# Patient Record
Sex: MALE | Race: WHITE | HISPANIC OR LATINO
[De-identification: names, ages, dates, MRNs, and addresses within clinical notes are randomized per-mention and may not be internally consistent; named-entity substitution may affect disease eponyms.]

---

## 2017-08-01 PROBLEM — Z00.00 ENCOUNTER FOR PREVENTIVE HEALTH EXAMINATION: Status: ACTIVE | Noted: 2017-08-01

## 2017-08-30 ENCOUNTER — APPOINTMENT (OUTPATIENT)
Dept: FAMILY MEDICINE | Facility: CLINIC | Age: 32
End: 2017-08-30

## 2018-01-30 ENCOUNTER — APPOINTMENT (OUTPATIENT)
Dept: HUMAN REPRODUCTION | Facility: CLINIC | Age: 33
End: 2018-01-30
Payer: COMMERCIAL

## 2018-01-30 PROCEDURE — 89322 SEMEN ANAL STRICT CRITERIA: CPT

## 2020-08-31 ENCOUNTER — EMERGENCY (EMERGENCY)
Facility: HOSPITAL | Age: 35
LOS: 0 days | Discharge: HOME | End: 2020-08-31
Attending: EMERGENCY MEDICINE
Payer: COMMERCIAL

## 2020-08-31 VITALS
RESPIRATION RATE: 18 BRPM | TEMPERATURE: 99 F | HEART RATE: 63 BPM | SYSTOLIC BLOOD PRESSURE: 164 MMHG | OXYGEN SATURATION: 97 % | WEIGHT: 233.91 LBS | HEIGHT: 70 IN | DIASTOLIC BLOOD PRESSURE: 103 MMHG

## 2020-08-31 VITALS
DIASTOLIC BLOOD PRESSURE: 95 MMHG | SYSTOLIC BLOOD PRESSURE: 168 MMHG | HEART RATE: 55 BPM | OXYGEN SATURATION: 98 % | RESPIRATION RATE: 20 BRPM

## 2020-08-31 DIAGNOSIS — M54.9 DORSALGIA, UNSPECIFIED: ICD-10-CM

## 2020-08-31 DIAGNOSIS — H91.93 UNSPECIFIED HEARING LOSS, BILATERAL: ICD-10-CM

## 2020-08-31 DIAGNOSIS — R07.9 CHEST PAIN, UNSPECIFIED: ICD-10-CM

## 2020-08-31 DIAGNOSIS — R00.1 BRADYCARDIA, UNSPECIFIED: ICD-10-CM

## 2020-08-31 DIAGNOSIS — R07.89 OTHER CHEST PAIN: ICD-10-CM

## 2020-08-31 DIAGNOSIS — R94.31 ABNORMAL ELECTROCARDIOGRAM [ECG] [EKG]: ICD-10-CM

## 2020-08-31 LAB
ALBUMIN SERPL ELPH-MCNC: 3.6 G/DL — SIGNIFICANT CHANGE UP (ref 3.3–5)
ALP SERPL-CCNC: 98 U/L — SIGNIFICANT CHANGE UP (ref 40–120)
ALT FLD-CCNC: 57 U/L — SIGNIFICANT CHANGE UP (ref 12–78)
ANION GAP SERPL CALC-SCNC: 6 MMOL/L — SIGNIFICANT CHANGE UP (ref 5–17)
AST SERPL-CCNC: 18 U/L — SIGNIFICANT CHANGE UP (ref 15–37)
BASOPHILS # BLD AUTO: 0.02 K/UL — SIGNIFICANT CHANGE UP (ref 0–0.2)
BASOPHILS NFR BLD AUTO: 0.3 % — SIGNIFICANT CHANGE UP (ref 0–2)
BILIRUB SERPL-MCNC: 0.3 MG/DL — SIGNIFICANT CHANGE UP (ref 0.2–1.2)
BUN SERPL-MCNC: 16 MG/DL — SIGNIFICANT CHANGE UP (ref 7–23)
CALCIUM SERPL-MCNC: 8.6 MG/DL — SIGNIFICANT CHANGE UP (ref 8.5–10.1)
CHLORIDE SERPL-SCNC: 111 MMOL/L — HIGH (ref 96–108)
CO2 SERPL-SCNC: 25 MMOL/L — SIGNIFICANT CHANGE UP (ref 22–31)
CREAT SERPL-MCNC: 1.2 MG/DL — SIGNIFICANT CHANGE UP (ref 0.5–1.3)
EOSINOPHIL # BLD AUTO: 0.19 K/UL — SIGNIFICANT CHANGE UP (ref 0–0.5)
EOSINOPHIL NFR BLD AUTO: 2.7 % — SIGNIFICANT CHANGE UP (ref 0–6)
GLUCOSE SERPL-MCNC: 143 MG/DL — HIGH (ref 70–99)
HCT VFR BLD CALC: 42.1 % — SIGNIFICANT CHANGE UP (ref 39–50)
HGB BLD-MCNC: 14.6 G/DL — SIGNIFICANT CHANGE UP (ref 13–17)
IMM GRANULOCYTES NFR BLD AUTO: 0.3 % — SIGNIFICANT CHANGE UP (ref 0–1.5)
LIDOCAIN IGE QN: 112 U/L — SIGNIFICANT CHANGE UP (ref 73–393)
LYMPHOCYTES # BLD AUTO: 3.13 K/UL — SIGNIFICANT CHANGE UP (ref 1–3.3)
LYMPHOCYTES # BLD AUTO: 44.8 % — HIGH (ref 13–44)
MAGNESIUM SERPL-MCNC: 2 MG/DL — SIGNIFICANT CHANGE UP (ref 1.6–2.6)
MCHC RBC-ENTMCNC: 30 PG — SIGNIFICANT CHANGE UP (ref 27–34)
MCHC RBC-ENTMCNC: 34.7 GM/DL — SIGNIFICANT CHANGE UP (ref 32–36)
MCV RBC AUTO: 86.6 FL — SIGNIFICANT CHANGE UP (ref 80–100)
MONOCYTES # BLD AUTO: 0.52 K/UL — SIGNIFICANT CHANGE UP (ref 0–0.9)
MONOCYTES NFR BLD AUTO: 7.4 % — SIGNIFICANT CHANGE UP (ref 2–14)
NEUTROPHILS # BLD AUTO: 3.1 K/UL — SIGNIFICANT CHANGE UP (ref 1.8–7.4)
NEUTROPHILS NFR BLD AUTO: 44.5 % — SIGNIFICANT CHANGE UP (ref 43–77)
NT-PROBNP SERPL-SCNC: 14 PG/ML — SIGNIFICANT CHANGE UP (ref 0–125)
PLATELET # BLD AUTO: 269 K/UL — SIGNIFICANT CHANGE UP (ref 150–400)
POTASSIUM SERPL-MCNC: 3.5 MMOL/L — SIGNIFICANT CHANGE UP (ref 3.5–5.3)
POTASSIUM SERPL-SCNC: 3.5 MMOL/L — SIGNIFICANT CHANGE UP (ref 3.5–5.3)
PROT SERPL-MCNC: 7.2 GM/DL — SIGNIFICANT CHANGE UP (ref 6–8.3)
RBC # BLD: 4.86 M/UL — SIGNIFICANT CHANGE UP (ref 4.2–5.8)
RBC # FLD: 12.2 % — SIGNIFICANT CHANGE UP (ref 10.3–14.5)
SODIUM SERPL-SCNC: 142 MMOL/L — SIGNIFICANT CHANGE UP (ref 135–145)
TROPONIN I SERPL-MCNC: <0.015 NG/ML — SIGNIFICANT CHANGE UP (ref 0.01–0.04)
TROPONIN I SERPL-MCNC: <0.015 NG/ML — SIGNIFICANT CHANGE UP (ref 0.01–0.04)
WBC # BLD: 6.98 K/UL — SIGNIFICANT CHANGE UP (ref 3.8–10.5)
WBC # FLD AUTO: 6.98 K/UL — SIGNIFICANT CHANGE UP (ref 3.8–10.5)

## 2020-08-31 PROCEDURE — 99284 EMERGENCY DEPT VISIT MOD MDM: CPT | Mod: 25

## 2020-08-31 PROCEDURE — 83735 ASSAY OF MAGNESIUM: CPT

## 2020-08-31 PROCEDURE — 93010 ELECTROCARDIOGRAM REPORT: CPT

## 2020-08-31 PROCEDURE — 36415 COLL VENOUS BLD VENIPUNCTURE: CPT

## 2020-08-31 PROCEDURE — 99284 EMERGENCY DEPT VISIT MOD MDM: CPT

## 2020-08-31 PROCEDURE — 80053 COMPREHEN METABOLIC PANEL: CPT

## 2020-08-31 PROCEDURE — 83690 ASSAY OF LIPASE: CPT

## 2020-08-31 PROCEDURE — 96374 THER/PROPH/DIAG INJ IV PUSH: CPT | Mod: XU

## 2020-08-31 PROCEDURE — 83880 ASSAY OF NATRIURETIC PEPTIDE: CPT

## 2020-08-31 PROCEDURE — 93005 ELECTROCARDIOGRAM TRACING: CPT

## 2020-08-31 PROCEDURE — 84484 ASSAY OF TROPONIN QUANT: CPT

## 2020-08-31 PROCEDURE — 71275 CT ANGIOGRAPHY CHEST: CPT

## 2020-08-31 PROCEDURE — 85025 COMPLETE CBC W/AUTO DIFF WBC: CPT

## 2020-08-31 PROCEDURE — 71275 CT ANGIOGRAPHY CHEST: CPT | Mod: 26

## 2020-08-31 RX ORDER — SODIUM CHLORIDE 9 MG/ML
1000 INJECTION INTRAMUSCULAR; INTRAVENOUS; SUBCUTANEOUS ONCE
Refills: 0 | Status: COMPLETED | OUTPATIENT
Start: 2020-08-31 | End: 2020-08-31

## 2020-08-31 RX ORDER — KETOROLAC TROMETHAMINE 30 MG/ML
30 SYRINGE (ML) INJECTION ONCE
Refills: 0 | Status: DISCONTINUED | OUTPATIENT
Start: 2020-08-31 | End: 2020-08-31

## 2020-08-31 RX ORDER — MORPHINE SULFATE 50 MG/1
4 CAPSULE, EXTENDED RELEASE ORAL ONCE
Refills: 0 | Status: DISCONTINUED | OUTPATIENT
Start: 2020-08-31 | End: 2020-08-31

## 2020-08-31 RX ADMIN — SODIUM CHLORIDE 1000 MILLILITER(S): 9 INJECTION INTRAMUSCULAR; INTRAVENOUS; SUBCUTANEOUS at 01:28

## 2020-08-31 RX ADMIN — MORPHINE SULFATE 4 MILLIGRAM(S): 50 CAPSULE, EXTENDED RELEASE ORAL at 01:28

## 2020-08-31 NOTE — ED PROVIDER NOTE - CLINICAL SUMMARY MEDICAL DECISION MAKING FREE TEXT BOX
right sided back pain with some radiation into right chest; will r/o PE, trop x 2, monitor and pain control

## 2020-08-31 NOTE — ED ADULT TRIAGE NOTE - CHIEF COMPLAINT QUOTE
back pain radiates to chest started 10mins pta. Denies shoulder pain or sob. HTN noted in Triage. Denies hx HTN.

## 2020-08-31 NOTE — ED PROVIDER NOTE - NSFOLLOWUPINSTRUCTIONS_ED_ALL_ED_FT
Follow up with your doctor today  Ibuprofen 600mg every 6 hours for pain if needed  Return to the ER for any further concerns    Back Pain    Back pain is very common in adults. The cause of back pain is rarely dangerous and the pain often gets better over time. The cause of your back pain may not be known and may include strain of muscles or ligaments, degeneration of the spinal disks, or arthritis. Occasionally the pain may radiate down your leg(s). Over-the-counter medicines to reduce pain and inflammation are often the most helpful. Stretching and remaining active frequently helps the healing process.     SEEK IMMEDIATE MEDICAL CARE IF YOU HAVE ANY OF THE FOLLOWING SYMPTOMS: bowel or bladder control problems, unusual weakness or numbness in your arms or legs, nausea or vomiting, abdominal pain, fever, dizziness/lightheadedness.    Chest Pain    Chest pain can be caused by many different conditions which may or may not be dangerous. Causes include heartburn, lung infections, heart attack, blood clot in lungs, skin infections, strain or damage to muscle, cartilage, or bones, etc. In addition to a history and physical examination, an electrocardiogram (ECG) or other lab tests may have been performed to determine the cause of your chest pain. Follow up with your primary care provider or with a cardiologist as instructed.     SEEK IMMEDIATE MEDICAL CARE IF YOU HAVE ANY OF THE FOLLOWING SYMPTOMS: worsening chest pain, coughing up blood, unexplained back/neck/jaw pain, severe abdominal pain, dizziness or lightheadedness, fainting, shortness of breath, sweaty or clammy skin, vomiting, or racing heart beat. These symptoms may represent a serious problem that is an emergency. Do not wait to see if the symptoms will go away. Get medical help right away. Call 911 and do not drive yourself to the hospital.

## 2020-08-31 NOTE — ED PROVIDER NOTE - OBJECTIVE STATEMENT
35 yo male with no pmh aside from deafness (wears hearing aids) states that he was well when he went to sleep a few hours ago.  He woke from sleep just PTA with right sided back pain that radiates to the right chest.  Pain was severe and he called 911.  Now pain in the back, but the chest is resolved. Denies sob and pain is not pleuritic.  No fever/cough.  No abdominal pain.  Patient is a nonsmoker, nondrinker. Last ate around 8pm.  Was told previously that his BP was a little high (140s) but his doctor did not start medication, but instead told him to lose weight.    PMD Dr Piter Monterroso

## 2020-08-31 NOTE — ED ADULT NURSE NOTE - OBJECTIVE STATEMENT
Patient complaining of chest pain that radiates to his mid back starting an hour PTA.  The pain woke him up at night, described as sharp.  Denies SOB.  Denies any medical history.

## 2020-08-31 NOTE — ED ADULT NURSE NOTE - NSIMPLEMENTINTERV_GEN_ALL_ED
Implemented All Universal Safety Interventions:  [place] to call system. Call bell, personal items and telephone within reach. Instruct patient to call for assistance. Room bathroom lighting operational. Non-slip footwear when patient is off stretcher. Physically safe environment: no spills, clutter or unnecessary equipment. Stretcher in lowest position, wheels locked, appropriate side rails in place.

## 2020-09-14 DIAGNOSIS — Z11.59 ENCOUNTER FOR SCREENING FOR OTHER VIRAL DISEASES: ICD-10-CM

## 2020-09-16 ENCOUNTER — APPOINTMENT (OUTPATIENT)
Dept: INTERNAL MEDICINE | Facility: CLINIC | Age: 35
End: 2020-09-16

## 2020-10-05 ENCOUNTER — RESULT REVIEW (OUTPATIENT)
Age: 35
End: 2020-10-05

## 2020-10-05 ENCOUNTER — INPATIENT (INPATIENT)
Facility: HOSPITAL | Age: 35
LOS: 0 days | Discharge: HOME | DRG: 419 | End: 2020-10-06
Attending: SURGERY | Admitting: SURGERY
Payer: COMMERCIAL

## 2020-10-05 VITALS
RESPIRATION RATE: 20 BRPM | HEART RATE: 61 BPM | HEIGHT: 70 IN | TEMPERATURE: 99 F | OXYGEN SATURATION: 96 % | DIASTOLIC BLOOD PRESSURE: 103 MMHG | SYSTOLIC BLOOD PRESSURE: 177 MMHG

## 2020-10-05 DIAGNOSIS — K81.9 CHOLECYSTITIS, UNSPECIFIED: ICD-10-CM

## 2020-10-05 PROBLEM — H91.90 UNSPECIFIED HEARING LOSS, UNSPECIFIED EAR: Chronic | Status: ACTIVE | Noted: 2020-08-31

## 2020-10-05 LAB
ALBUMIN SERPL ELPH-MCNC: 4.1 G/DL — SIGNIFICANT CHANGE UP (ref 3.3–5)
ALP SERPL-CCNC: 118 U/L — SIGNIFICANT CHANGE UP (ref 40–120)
ALT FLD-CCNC: 55 U/L — SIGNIFICANT CHANGE UP (ref 12–78)
ANION GAP SERPL CALC-SCNC: 6 MMOL/L — SIGNIFICANT CHANGE UP (ref 5–17)
APPEARANCE UR: CLEAR — SIGNIFICANT CHANGE UP
AST SERPL-CCNC: 25 U/L — SIGNIFICANT CHANGE UP (ref 15–37)
BASOPHILS # BLD AUTO: 0.03 K/UL — SIGNIFICANT CHANGE UP (ref 0–0.2)
BASOPHILS NFR BLD AUTO: 0.3 % — SIGNIFICANT CHANGE UP (ref 0–2)
BILIRUB SERPL-MCNC: 0.3 MG/DL — SIGNIFICANT CHANGE UP (ref 0.2–1.2)
BILIRUB UR-MCNC: NEGATIVE — SIGNIFICANT CHANGE UP
BUN SERPL-MCNC: 17 MG/DL — SIGNIFICANT CHANGE UP (ref 7–23)
CALCIUM SERPL-MCNC: 9.2 MG/DL — SIGNIFICANT CHANGE UP (ref 8.5–10.1)
CHLORIDE SERPL-SCNC: 110 MMOL/L — HIGH (ref 96–108)
CO2 SERPL-SCNC: 25 MMOL/L — SIGNIFICANT CHANGE UP (ref 22–31)
COLOR SPEC: YELLOW — SIGNIFICANT CHANGE UP
CREAT SERPL-MCNC: 1.15 MG/DL — SIGNIFICANT CHANGE UP (ref 0.5–1.3)
DIFF PNL FLD: NEGATIVE — SIGNIFICANT CHANGE UP
EOSINOPHIL # BLD AUTO: 0.22 K/UL — SIGNIFICANT CHANGE UP (ref 0–0.5)
EOSINOPHIL NFR BLD AUTO: 2.4 % — SIGNIFICANT CHANGE UP (ref 0–6)
GLUCOSE SERPL-MCNC: 123 MG/DL — HIGH (ref 70–99)
GLUCOSE UR QL: NEGATIVE MG/DL — SIGNIFICANT CHANGE UP
HCT VFR BLD CALC: 44 % — SIGNIFICANT CHANGE UP (ref 39–50)
HGB BLD-MCNC: 15.4 G/DL — SIGNIFICANT CHANGE UP (ref 13–17)
IMM GRANULOCYTES NFR BLD AUTO: 0.2 % — SIGNIFICANT CHANGE UP (ref 0–1.5)
INR BLD: 1.04 RATIO — SIGNIFICANT CHANGE UP (ref 0.88–1.16)
KETONES UR-MCNC: NEGATIVE — SIGNIFICANT CHANGE UP
LACTATE SERPL-SCNC: 1.4 MMOL/L — SIGNIFICANT CHANGE UP (ref 0.7–2)
LEUKOCYTE ESTERASE UR-ACNC: NEGATIVE — SIGNIFICANT CHANGE UP
LIDOCAIN IGE QN: 108 U/L — SIGNIFICANT CHANGE UP (ref 73–393)
LYMPHOCYTES # BLD AUTO: 4.77 K/UL — HIGH (ref 1–3.3)
LYMPHOCYTES # BLD AUTO: 51.7 % — HIGH (ref 13–44)
MCHC RBC-ENTMCNC: 29.8 PG — SIGNIFICANT CHANGE UP (ref 27–34)
MCHC RBC-ENTMCNC: 35 GM/DL — SIGNIFICANT CHANGE UP (ref 32–36)
MCV RBC AUTO: 85.3 FL — SIGNIFICANT CHANGE UP (ref 80–100)
MONOCYTES # BLD AUTO: 0.76 K/UL — SIGNIFICANT CHANGE UP (ref 0–0.9)
MONOCYTES NFR BLD AUTO: 8.2 % — SIGNIFICANT CHANGE UP (ref 2–14)
NEUTROPHILS # BLD AUTO: 3.43 K/UL — SIGNIFICANT CHANGE UP (ref 1.8–7.4)
NEUTROPHILS NFR BLD AUTO: 37.2 % — LOW (ref 43–77)
NITRITE UR-MCNC: NEGATIVE — SIGNIFICANT CHANGE UP
PH UR: 7 — SIGNIFICANT CHANGE UP (ref 5–8)
PLATELET # BLD AUTO: 286 K/UL — SIGNIFICANT CHANGE UP (ref 150–400)
POTASSIUM SERPL-MCNC: 3.5 MMOL/L — SIGNIFICANT CHANGE UP (ref 3.5–5.3)
POTASSIUM SERPL-SCNC: 3.5 MMOL/L — SIGNIFICANT CHANGE UP (ref 3.5–5.3)
PROT SERPL-MCNC: 8.2 GM/DL — SIGNIFICANT CHANGE UP (ref 6–8.3)
PROT UR-MCNC: NEGATIVE MG/DL — SIGNIFICANT CHANGE UP
PROTHROM AB SERPL-ACNC: 12 SEC — SIGNIFICANT CHANGE UP (ref 10.6–13.6)
RBC # BLD: 5.16 M/UL — SIGNIFICANT CHANGE UP (ref 4.2–5.8)
RBC # FLD: 12.2 % — SIGNIFICANT CHANGE UP (ref 10.3–14.5)
SARS-COV-2 RNA SPEC QL NAA+PROBE: SIGNIFICANT CHANGE UP
SODIUM SERPL-SCNC: 141 MMOL/L — SIGNIFICANT CHANGE UP (ref 135–145)
SP GR SPEC: 1.01 — SIGNIFICANT CHANGE UP (ref 1.01–1.02)
UROBILINOGEN FLD QL: NEGATIVE MG/DL — SIGNIFICANT CHANGE UP
WBC # BLD: 9.23 K/UL — SIGNIFICANT CHANGE UP (ref 3.8–10.5)
WBC # FLD AUTO: 9.23 K/UL — SIGNIFICANT CHANGE UP (ref 3.8–10.5)

## 2020-10-05 PROCEDURE — 87040 BLOOD CULTURE FOR BACTERIA: CPT

## 2020-10-05 PROCEDURE — 86900 BLOOD TYPING SEROLOGIC ABO: CPT

## 2020-10-05 PROCEDURE — 87635 SARS-COV-2 COVID-19 AMP PRB: CPT

## 2020-10-05 PROCEDURE — 78226 HEPATOBILIARY SYSTEM IMAGING: CPT | Mod: 26

## 2020-10-05 PROCEDURE — 99222 1ST HOSP IP/OBS MODERATE 55: CPT | Mod: 25,57

## 2020-10-05 PROCEDURE — 86901 BLOOD TYPING SEROLOGIC RH(D): CPT

## 2020-10-05 PROCEDURE — 94640 AIRWAY INHALATION TREATMENT: CPT

## 2020-10-05 PROCEDURE — 47562 LAPAROSCOPIC CHOLECYSTECTOMY: CPT

## 2020-10-05 PROCEDURE — 86850 RBC ANTIBODY SCREEN: CPT

## 2020-10-05 PROCEDURE — 88304 TISSUE EXAM BY PATHOLOGIST: CPT

## 2020-10-05 PROCEDURE — 80048 BASIC METABOLIC PNL TOTAL CA: CPT

## 2020-10-05 PROCEDURE — 76705 ECHO EXAM OF ABDOMEN: CPT | Mod: 26

## 2020-10-05 PROCEDURE — 36415 COLL VENOUS BLD VENIPUNCTURE: CPT

## 2020-10-05 PROCEDURE — 88304 TISSUE EXAM BY PATHOLOGIST: CPT | Mod: 26

## 2020-10-05 PROCEDURE — 86769 SARS-COV-2 COVID-19 ANTIBODY: CPT

## 2020-10-05 PROCEDURE — 71045 X-RAY EXAM CHEST 1 VIEW: CPT

## 2020-10-05 PROCEDURE — C9113: CPT

## 2020-10-05 PROCEDURE — 85027 COMPLETE CBC AUTOMATED: CPT

## 2020-10-05 PROCEDURE — 84100 ASSAY OF PHOSPHORUS: CPT

## 2020-10-05 PROCEDURE — 83735 ASSAY OF MAGNESIUM: CPT

## 2020-10-05 PROCEDURE — C1889: CPT

## 2020-10-05 PROCEDURE — 93005 ELECTROCARDIOGRAM TRACING: CPT

## 2020-10-05 PROCEDURE — 80076 HEPATIC FUNCTION PANEL: CPT

## 2020-10-05 PROCEDURE — 83605 ASSAY OF LACTIC ACID: CPT

## 2020-10-05 PROCEDURE — 74177 CT ABD & PELVIS W/CONTRAST: CPT | Mod: 26

## 2020-10-05 PROCEDURE — 71045 X-RAY EXAM CHEST 1 VIEW: CPT | Mod: 26

## 2020-10-05 PROCEDURE — 85610 PROTHROMBIN TIME: CPT

## 2020-10-05 RX ORDER — SODIUM CHLORIDE 9 MG/ML
1000 INJECTION, SOLUTION INTRAVENOUS
Refills: 0 | Status: DISCONTINUED | OUTPATIENT
Start: 2020-10-05 | End: 2020-10-05

## 2020-10-05 RX ORDER — KETOROLAC TROMETHAMINE 30 MG/ML
15 SYRINGE (ML) INJECTION EVERY 6 HOURS
Refills: 0 | Status: DISCONTINUED | OUTPATIENT
Start: 2020-10-05 | End: 2020-10-05

## 2020-10-05 RX ORDER — PANTOPRAZOLE SODIUM 20 MG/1
40 TABLET, DELAYED RELEASE ORAL DAILY
Refills: 0 | Status: DISCONTINUED | OUTPATIENT
Start: 2020-10-05 | End: 2020-10-06

## 2020-10-05 RX ORDER — HEPARIN SODIUM 5000 [USP'U]/ML
5000 INJECTION INTRAVENOUS; SUBCUTANEOUS EVERY 8 HOURS
Refills: 0 | Status: DISCONTINUED | OUTPATIENT
Start: 2020-10-05 | End: 2020-10-05

## 2020-10-05 RX ORDER — ONDANSETRON 8 MG/1
4 TABLET, FILM COATED ORAL EVERY 6 HOURS
Refills: 0 | Status: DISCONTINUED | OUTPATIENT
Start: 2020-10-05 | End: 2020-10-05

## 2020-10-05 RX ORDER — SODIUM CHLORIDE 9 MG/ML
1000 INJECTION INTRAMUSCULAR; INTRAVENOUS; SUBCUTANEOUS ONCE
Refills: 0 | Status: COMPLETED | OUTPATIENT
Start: 2020-10-05 | End: 2020-10-05

## 2020-10-05 RX ORDER — FENTANYL CITRATE 50 UG/ML
50 INJECTION INTRAVENOUS
Refills: 0 | Status: DISCONTINUED | OUTPATIENT
Start: 2020-10-05 | End: 2020-10-05

## 2020-10-05 RX ORDER — ONDANSETRON 8 MG/1
4 TABLET, FILM COATED ORAL ONCE
Refills: 0 | Status: DISCONTINUED | OUTPATIENT
Start: 2020-10-05 | End: 2020-10-06

## 2020-10-05 RX ORDER — MORPHINE SULFATE 50 MG/1
2 CAPSULE, EXTENDED RELEASE ORAL EVERY 6 HOURS
Refills: 0 | Status: DISCONTINUED | OUTPATIENT
Start: 2020-10-05 | End: 2020-10-05

## 2020-10-05 RX ORDER — ONDANSETRON 8 MG/1
4 TABLET, FILM COATED ORAL EVERY 6 HOURS
Refills: 0 | Status: DISCONTINUED | OUTPATIENT
Start: 2020-10-05 | End: 2020-10-06

## 2020-10-05 RX ORDER — SODIUM CHLORIDE 9 MG/ML
1000 INJECTION, SOLUTION INTRAVENOUS
Refills: 0 | Status: DISCONTINUED | OUTPATIENT
Start: 2020-10-05 | End: 2020-10-06

## 2020-10-05 RX ORDER — SODIUM CHLORIDE 9 MG/ML
1000 INJECTION INTRAMUSCULAR; INTRAVENOUS; SUBCUTANEOUS
Refills: 0 | Status: DISCONTINUED | OUTPATIENT
Start: 2020-10-05 | End: 2020-10-05

## 2020-10-05 RX ORDER — HEPARIN SODIUM 5000 [USP'U]/ML
5000 INJECTION INTRAVENOUS; SUBCUTANEOUS EVERY 8 HOURS
Refills: 0 | Status: DISCONTINUED | OUTPATIENT
Start: 2020-10-05 | End: 2020-10-06

## 2020-10-05 RX ORDER — HYDRALAZINE HCL 50 MG
10 TABLET ORAL ONCE
Refills: 0 | Status: COMPLETED | OUTPATIENT
Start: 2020-10-05 | End: 2020-10-05

## 2020-10-05 RX ORDER — PANTOPRAZOLE SODIUM 20 MG/1
40 TABLET, DELAYED RELEASE ORAL DAILY
Refills: 0 | Status: DISCONTINUED | OUTPATIENT
Start: 2020-10-05 | End: 2020-10-05

## 2020-10-05 RX ORDER — ONDANSETRON 8 MG/1
4 TABLET, FILM COATED ORAL ONCE
Refills: 0 | Status: COMPLETED | OUTPATIENT
Start: 2020-10-05 | End: 2020-10-05

## 2020-10-05 RX ORDER — INFLUENZA VIRUS VACCINE 15; 15; 15; 15 UG/.5ML; UG/.5ML; UG/.5ML; UG/.5ML
0.5 SUSPENSION INTRAMUSCULAR ONCE
Refills: 0 | Status: COMPLETED | OUTPATIENT
Start: 2020-10-05 | End: 2020-10-05

## 2020-10-05 RX ORDER — PIPERACILLIN AND TAZOBACTAM 4; .5 G/20ML; G/20ML
3.38 INJECTION, POWDER, LYOPHILIZED, FOR SOLUTION INTRAVENOUS ONCE
Refills: 0 | Status: COMPLETED | OUTPATIENT
Start: 2020-10-05 | End: 2020-10-05

## 2020-10-05 RX ORDER — OXYCODONE HYDROCHLORIDE 5 MG/1
5 TABLET ORAL ONCE
Refills: 0 | Status: DISCONTINUED | OUTPATIENT
Start: 2020-10-05 | End: 2020-10-05

## 2020-10-05 RX ORDER — ONDANSETRON 8 MG/1
4 TABLET, FILM COATED ORAL ONCE
Refills: 0 | Status: DISCONTINUED | OUTPATIENT
Start: 2020-10-05 | End: 2020-10-05

## 2020-10-05 RX ORDER — HYDROMORPHONE HYDROCHLORIDE 2 MG/ML
1 INJECTION INTRAMUSCULAR; INTRAVENOUS; SUBCUTANEOUS ONCE
Refills: 0 | Status: DISCONTINUED | OUTPATIENT
Start: 2020-10-05 | End: 2020-10-05

## 2020-10-05 RX ORDER — ACETAMINOPHEN 500 MG
1000 TABLET ORAL EVERY 6 HOURS
Refills: 0 | Status: COMPLETED | OUTPATIENT
Start: 2020-10-05 | End: 2020-10-06

## 2020-10-05 RX ORDER — MORPHINE SULFATE 50 MG/1
4 CAPSULE, EXTENDED RELEASE ORAL ONCE
Refills: 0 | Status: DISCONTINUED | OUTPATIENT
Start: 2020-10-05 | End: 2020-10-05

## 2020-10-05 RX ORDER — PIPERACILLIN AND TAZOBACTAM 4; .5 G/20ML; G/20ML
3.38 INJECTION, POWDER, LYOPHILIZED, FOR SOLUTION INTRAVENOUS EVERY 8 HOURS
Refills: 0 | Status: DISCONTINUED | OUTPATIENT
Start: 2020-10-05 | End: 2020-10-05

## 2020-10-05 RX ORDER — MORPHINE SULFATE 50 MG/1
2 CAPSULE, EXTENDED RELEASE ORAL EVERY 6 HOURS
Refills: 0 | Status: DISCONTINUED | OUTPATIENT
Start: 2020-10-05 | End: 2020-10-06

## 2020-10-05 RX ADMIN — SODIUM CHLORIDE 1000 MILLILITER(S): 9 INJECTION INTRAMUSCULAR; INTRAVENOUS; SUBCUTANEOUS at 03:00

## 2020-10-05 RX ADMIN — ONDANSETRON 4 MILLIGRAM(S): 8 TABLET, FILM COATED ORAL at 03:00

## 2020-10-05 RX ADMIN — HEPARIN SODIUM 5000 UNIT(S): 5000 INJECTION INTRAVENOUS; SUBCUTANEOUS at 22:48

## 2020-10-05 RX ADMIN — PIPERACILLIN AND TAZOBACTAM 200 GRAM(S): 4; .5 INJECTION, POWDER, LYOPHILIZED, FOR SOLUTION INTRAVENOUS at 12:24

## 2020-10-05 RX ADMIN — MORPHINE SULFATE 4 MILLIGRAM(S): 50 CAPSULE, EXTENDED RELEASE ORAL at 03:25

## 2020-10-05 RX ADMIN — ONDANSETRON 4 MILLIGRAM(S): 8 TABLET, FILM COATED ORAL at 03:25

## 2020-10-05 RX ADMIN — Medication 10 MILLIGRAM(S): at 17:09

## 2020-10-05 RX ADMIN — HYDROMORPHONE HYDROCHLORIDE 1 MILLIGRAM(S): 2 INJECTION INTRAMUSCULAR; INTRAVENOUS; SUBCUTANEOUS at 06:11

## 2020-10-05 RX ADMIN — SODIUM CHLORIDE 125 MILLILITER(S): 9 INJECTION INTRAMUSCULAR; INTRAVENOUS; SUBCUTANEOUS at 13:00

## 2020-10-05 RX ADMIN — SODIUM CHLORIDE 1000 MILLILITER(S): 9 INJECTION INTRAMUSCULAR; INTRAVENOUS; SUBCUTANEOUS at 04:50

## 2020-10-05 NOTE — H&P ADULT - NSHPLABSRESULTS_GEN_ALL_CORE
15.4   9.23  )-----------( 286      ( 05 Oct 2020 02:34 )             44.0   10-05    141  |  110<H>  |  17  ----------------------------<  123<H>  3.5   |  25  |  1.15    Ca    9.2      05 Oct 2020 02:34    TPro  8.2  /  Alb  4.1  /  TBili  0.3  /  DBili  x   /  AST  25  /  ALT  55  /  AlkPhos  118  10-05

## 2020-10-05 NOTE — H&P ADULT - HISTORY OF PRESENT ILLNESS
33 yo M presents with abdominal pain. Patient reports he ate tacos for dinner last night and then gradually developed low back pain, right upper quadrant abdominal pain, nausea and vomiting.  Patient reports multiple episodes of bilious emesis brought him to the ED. patient reports pain in the RUQ has previously developed after eating multiple times this month but subsides after resting. Patient denies fever, chills, SOB and CP.      PMH: hearing loss (bilateral hearing aids)   PSH: None   Meds: none   Allx: NKDA

## 2020-10-05 NOTE — ED ADULT NURSE NOTE - OBJECTIVE STATEMENT
Pt presents to er with complaints of ruq pain radiating to his right flank, pt denies any medical history, states he had taco meat at a restaurant tonight and became sick around midnight with emesis/nauseau.

## 2020-10-05 NOTE — H&P ADULT - ASSESSMENT
35 yo M presents with abdominal pain found to have acute cholecystitis     Plan:   monitor vitals   pain control   nausea control   Diet: NPO   IVF hydration   IV axb   encourage IS use  encourage OOB/A  DVT/GI ppx   plan possible OR today vs tomorrow   COVID pending     Plan discussed with attending, Dr Velazquez 35 yo M presents with abdominal pain found to have acute cholecystitis     Plan:   monitor vitals   pain control   nausea control   Diet: NPO   IVF hydration   IV axb   encourage IS use  encourage OOB/A  DVT/GI ppx   plan OR today  COVID pending

## 2020-10-05 NOTE — ED PROVIDER NOTE - CLINICAL SUMMARY MEDICAL DECISION MAKING FREE TEXT BOX
RUQ abd pain; vomiting; back pain s/p eating tacos.  Labs, urine, US to r/o biliary colic.  If US normal, patient to get CT abd/pelvis.

## 2020-10-05 NOTE — ED ADULT NURSE NOTE - NSIMPLEMENTINTERV_GEN_ALL_ED
Implemented All Universal Safety Interventions:  [place] to call system. Call bell, personal items and telephone within reach. Instruct patient to call for assistance. Room bathroom lighting operational. Non-slip footwear when patient is off stretcher. Physically safe environment: no spills, clutter or unnecessary equipment. Stretcher in lowest position, wheels locked, appropriate side rails in place.

## 2020-10-05 NOTE — ED PROVIDER NOTE - PROGRESS NOTE DETAILS
Patient not feeling any better; abdominal exam still reveals RUQ tenderness.  Will call general surgery for consult.  Patient may need HIDA this mornig to r/o acute cholecystitis. Discussed case with Dr. Joel on call for general surgery.  HIDA scan recommended at this time.  Call surgery when HIDA is complete.  Dr. Velazquez should be available today to see patient after HIDA. Attending Pimentel, Dr. Renner called with + hida scan  Dr. Velazquez called and pt can be admitted to his service. Attending Pimentel, pt updated on results of test and plan for admission

## 2020-10-05 NOTE — ED PROVIDER NOTE - OBJECTIVE STATEMENT
Pt. is a 33 yo M without any medical problems presenting with abdominal pain since last night.  Patient states he ate tacos for dinner last night and then gradually developed low back pain, right upper quadrant abdominal pain and vomiting.  Patient vomitted multiple times at home and was brought in by ambulance for evaluation.  Patient states he has had similar pain in the past without known diagnosis.  He denies hx of gallstones, kidney stones, abdominal surgery.  No meds taken prior to arrival.

## 2020-10-05 NOTE — H&P ADULT - NSHPPHYSICALEXAM_GEN_ALL_CORE
Gen: NAD, cooperative   HEENT: supple, no JVD, EOMI  Lungs: CTA b/l, aerating well   CV: S1 and S2 present  Abd: BS present, TTP RUQ ,ND, no RGR   Ext: FROM, pulse present, no cyanosis, No edema   Skin: warm, dry

## 2020-10-05 NOTE — ED ADULT TRIAGE NOTE - CHIEF COMPLAINT QUOTE
pt arrives by [facility] from home with RUQ abdominal pain radiating to back starting about a month ago. pt has been worked up in past and states "its my gallbladder." pt woke up from sleep tonight with nausea and vomiting.

## 2020-10-05 NOTE — ED PROVIDER NOTE - GASTROINTESTINAL, MLM
Abdomen soft, nondistended; normal bowel sounds; +RUQ tenderness, negative murphys sign.  No rebound or guarding.  No CVAT.

## 2020-10-06 ENCOUNTER — TRANSCRIPTION ENCOUNTER (OUTPATIENT)
Age: 35
End: 2020-10-06

## 2020-10-06 VITALS
TEMPERATURE: 99 F | SYSTOLIC BLOOD PRESSURE: 124 MMHG | DIASTOLIC BLOOD PRESSURE: 75 MMHG | HEART RATE: 68 BPM | OXYGEN SATURATION: 96 % | RESPIRATION RATE: 18 BRPM

## 2020-10-06 LAB
ALBUMIN SERPL ELPH-MCNC: 3.9 G/DL — SIGNIFICANT CHANGE UP (ref 3.3–5)
ALP SERPL-CCNC: 80 U/L — SIGNIFICANT CHANGE UP (ref 40–120)
ALT FLD-CCNC: 104 U/L — HIGH (ref 12–78)
ANION GAP SERPL CALC-SCNC: 5 MMOL/L — SIGNIFICANT CHANGE UP (ref 5–17)
ANION GAP SERPL CALC-SCNC: 5 MMOL/L — SIGNIFICANT CHANGE UP (ref 5–17)
AST SERPL-CCNC: 43 U/L — HIGH (ref 15–37)
BILIRUB DIRECT SERPL-MCNC: 0.2 MG/DL — SIGNIFICANT CHANGE UP (ref 0–0.2)
BILIRUB INDIRECT FLD-MCNC: 0.5 MG/DL — SIGNIFICANT CHANGE UP (ref 0.2–1)
BILIRUB SERPL-MCNC: 0.7 MG/DL — SIGNIFICANT CHANGE UP (ref 0.2–1.2)
BUN SERPL-MCNC: 12 MG/DL — SIGNIFICANT CHANGE UP (ref 7–23)
BUN SERPL-MCNC: 9 MG/DL — SIGNIFICANT CHANGE UP (ref 7–23)
CALCIUM SERPL-MCNC: 9 MG/DL — SIGNIFICANT CHANGE UP (ref 8.5–10.1)
CALCIUM SERPL-MCNC: 9.1 MG/DL — SIGNIFICANT CHANGE UP (ref 8.5–10.1)
CHLORIDE SERPL-SCNC: 109 MMOL/L — HIGH (ref 96–108)
CHLORIDE SERPL-SCNC: 110 MMOL/L — HIGH (ref 96–108)
CO2 SERPL-SCNC: 25 MMOL/L — SIGNIFICANT CHANGE UP (ref 22–31)
CO2 SERPL-SCNC: 28 MMOL/L — SIGNIFICANT CHANGE UP (ref 22–31)
CREAT SERPL-MCNC: 1.12 MG/DL — SIGNIFICANT CHANGE UP (ref 0.5–1.3)
CREAT SERPL-MCNC: 1.12 MG/DL — SIGNIFICANT CHANGE UP (ref 0.5–1.3)
GLUCOSE SERPL-MCNC: 103 MG/DL — HIGH (ref 70–99)
GLUCOSE SERPL-MCNC: 119 MG/DL — HIGH (ref 70–99)
HCT VFR BLD CALC: 38.8 % — LOW (ref 39–50)
HCT VFR BLD CALC: 42.2 % — SIGNIFICANT CHANGE UP (ref 39–50)
HGB BLD-MCNC: 13.1 G/DL — SIGNIFICANT CHANGE UP (ref 13–17)
HGB BLD-MCNC: 14.3 G/DL — SIGNIFICANT CHANGE UP (ref 13–17)
MAGNESIUM SERPL-MCNC: 2.4 MG/DL — SIGNIFICANT CHANGE UP (ref 1.6–2.6)
MCHC RBC-ENTMCNC: 29.7 PG — SIGNIFICANT CHANGE UP (ref 27–34)
MCHC RBC-ENTMCNC: 29.8 PG — SIGNIFICANT CHANGE UP (ref 27–34)
MCHC RBC-ENTMCNC: 33.8 GM/DL — SIGNIFICANT CHANGE UP (ref 32–36)
MCHC RBC-ENTMCNC: 33.9 GM/DL — SIGNIFICANT CHANGE UP (ref 32–36)
MCV RBC AUTO: 87.6 FL — SIGNIFICANT CHANGE UP (ref 80–100)
MCV RBC AUTO: 88.4 FL — SIGNIFICANT CHANGE UP (ref 80–100)
PHOSPHATE SERPL-MCNC: 3.4 MG/DL — SIGNIFICANT CHANGE UP (ref 2.5–4.5)
PLATELET # BLD AUTO: 250 K/UL — SIGNIFICANT CHANGE UP (ref 150–400)
PLATELET # BLD AUTO: 273 K/UL — SIGNIFICANT CHANGE UP (ref 150–400)
POTASSIUM SERPL-MCNC: 3.9 MMOL/L — SIGNIFICANT CHANGE UP (ref 3.5–5.3)
POTASSIUM SERPL-MCNC: 4 MMOL/L — SIGNIFICANT CHANGE UP (ref 3.5–5.3)
POTASSIUM SERPL-SCNC: 3.9 MMOL/L — SIGNIFICANT CHANGE UP (ref 3.5–5.3)
POTASSIUM SERPL-SCNC: 4 MMOL/L — SIGNIFICANT CHANGE UP (ref 3.5–5.3)
PROT SERPL-MCNC: 7.8 GM/DL — SIGNIFICANT CHANGE UP (ref 6–8.3)
RBC # BLD: 4.39 M/UL — SIGNIFICANT CHANGE UP (ref 4.2–5.8)
RBC # BLD: 4.82 M/UL — SIGNIFICANT CHANGE UP (ref 4.2–5.8)
RBC # FLD: 12.7 % — SIGNIFICANT CHANGE UP (ref 10.3–14.5)
RBC # FLD: 12.8 % — SIGNIFICANT CHANGE UP (ref 10.3–14.5)
SODIUM SERPL-SCNC: 140 MMOL/L — SIGNIFICANT CHANGE UP (ref 135–145)
SODIUM SERPL-SCNC: 142 MMOL/L — SIGNIFICANT CHANGE UP (ref 135–145)
WBC # BLD: 13.88 K/UL — HIGH (ref 3.8–10.5)
WBC # BLD: 9.46 K/UL — SIGNIFICANT CHANGE UP (ref 3.8–10.5)
WBC # FLD AUTO: 13.88 K/UL — HIGH (ref 3.8–10.5)
WBC # FLD AUTO: 9.46 K/UL — SIGNIFICANT CHANGE UP (ref 3.8–10.5)

## 2020-10-06 PROCEDURE — 93010 ELECTROCARDIOGRAM REPORT: CPT

## 2020-10-06 PROCEDURE — 71045 X-RAY EXAM CHEST 1 VIEW: CPT | Mod: 26

## 2020-10-06 RX ORDER — ALBUTEROL 90 UG/1
1 AEROSOL, METERED ORAL ONCE
Refills: 0 | Status: COMPLETED | OUTPATIENT
Start: 2020-10-06 | End: 2020-10-06

## 2020-10-06 RX ORDER — OXYCODONE HYDROCHLORIDE 5 MG/1
1 TABLET ORAL
Qty: 20 | Refills: 0
Start: 2020-10-06 | End: 2020-10-10

## 2020-10-06 RX ADMIN — ALBUTEROL 1 PUFF(S): 90 AEROSOL, METERED ORAL at 02:03

## 2020-10-06 RX ADMIN — MORPHINE SULFATE 2 MILLIGRAM(S): 50 CAPSULE, EXTENDED RELEASE ORAL at 01:53

## 2020-10-06 RX ADMIN — PANTOPRAZOLE SODIUM 40 MILLIGRAM(S): 20 TABLET, DELAYED RELEASE ORAL at 01:18

## 2020-10-06 RX ADMIN — Medication 1000 MILLIGRAM(S): at 07:47

## 2020-10-06 RX ADMIN — HEPARIN SODIUM 5000 UNIT(S): 5000 INJECTION INTRAVENOUS; SUBCUTANEOUS at 06:13

## 2020-10-06 RX ADMIN — Medication 400 MILLIGRAM(S): at 02:03

## 2020-10-06 RX ADMIN — SODIUM CHLORIDE 120 MILLILITER(S): 9 INJECTION, SOLUTION INTRAVENOUS at 01:11

## 2020-10-06 RX ADMIN — Medication 400 MILLIGRAM(S): at 07:47

## 2020-10-06 RX ADMIN — Medication 1000 MILLIGRAM(S): at 02:25

## 2020-10-06 RX ADMIN — MORPHINE SULFATE 2 MILLIGRAM(S): 50 CAPSULE, EXTENDED RELEASE ORAL at 01:18

## 2020-10-06 NOTE — DISCHARGE NOTE PROVIDER - HOSPITAL COURSE
Pt presented with acute cholecystitis, underwent laparoscopic cholecystectomy.  Patient tolerated procedure well.

## 2020-10-06 NOTE — DISCHARGE NOTE PROVIDER - CARE PROVIDER_API CALL
Ricardo Velazquez  SURGERY  [address] [place] [address], [address] [place] [address]  Phone: [phone]  Fax: [phone]  Follow Up Time:

## 2020-10-06 NOTE — DISCHARGE NOTE PROVIDER - NSDCACTIVITY_GEN_ALL_CORE
No heavy lifting/straining Do not drive or operate machinery/Walking - Indoors allowed/No heavy lifting/straining/Showering allowed/Do not make important decisions/Stairs allowed/Walking - Outdoors allowed

## 2020-10-06 NOTE — DISCHARGE NOTE NURSING/CASE MANAGEMENT/SOCIAL WORK - PATIENT PORTAL LINK FT
You can access the FollowMyHealth Patient Portal offered by [facility] by registering at the following website: [email or web address]. By joining [employer]’s FollowMyHealth portal, you will also be able to view your health information using other applications (apps) compatible with our system.

## 2020-10-06 NOTE — DISCHARGE NOTE PROVIDER - NSDCMRMEDTOKEN_GEN_ALL_CORE_FT
oxyCODONE 5 mg oral tablet: 1 tab(s) orally every 6 hours, As Needed -for moderate pain MDD:4 tabs

## 2020-10-06 NOTE — DISCHARGE NOTE NURSING/CASE MANAGEMENT/SOCIAL WORK - CAREGIVER ADDRESS
Dx: Gait instability, Heart failure, LVAD (left ventricular assist device) present          Authorized # of Visits:  10         Next MD visit: none scheduled  Fall Risk: High        Precautions:  Pacemaker, O2 2L,    LVAD (brand: Heartware) Restrictions: to this.        Goals: (To be completed in 18 visits or less)  · Pt will demonstrate improved SLS to >10 seconds HELEN to promote safety and decrease risk of falls on uneven surfaces such as grass. - progress  · Pt will perform TUG in <20 seconds with least r Walking within session, long distance with walker, short without Walking within session, long distance with walker, short without   6\" step up  - fwd x 12 R/L  - side step up/over x 12 (several rests due to fatigue) Flight of stairs with handrail x 13 chano diaphragmatic breathing x 6 min Pt education diagnosis, RBC, hemoglobin, O2 perfusion x 5 min - - Rockerboad, AP, no UEs x 20 (fair control)  - multi-direct catch, feet together x 2 min Large rockerboard  - AP x 20  - balance 3 x 20s  - Bosu  - alt wt shif same as pt

## 2020-10-06 NOTE — DISCHARGE NOTE PROVIDER - NSDCFUADDINST_GEN_ALL_CORE_FT
do not lift greater than 10lbs for 4-6 weeks   You may take off outer dressing tomorrow    You may begin showering normally tomorrow.   The white strips over the surgical sites will fall off on their own after several showers. Please seek immediate medical attention for fever>100.4, worsening abdominal pain, chest pain, shortness of breath, skin/eye color changes, any adverse changes to health    do not lift greater than 10lbs for 4-6 weeks   You may take off outer dressing tomorrow    You may begin showering normally tomorrow.   The white strips over the surgical sites will fall off on their own after several showers.

## 2020-10-10 LAB
CULTURE RESULTS: SIGNIFICANT CHANGE UP
SPECIMEN SOURCE: SIGNIFICANT CHANGE UP

## 2020-10-14 DIAGNOSIS — Z97.4 PRESENCE OF EXTERNAL HEARING-AID: ICD-10-CM

## 2020-10-14 DIAGNOSIS — K80.12 CALCULUS OF GALLBLADDER WITH ACUTE AND CHRONIC CHOLECYSTITIS WITHOUT OBSTRUCTION: ICD-10-CM

## 2020-10-14 DIAGNOSIS — H91.93 UNSPECIFIED HEARING LOSS, BILATERAL: ICD-10-CM

## 2020-10-21 ENCOUNTER — TRANSCRIPTION ENCOUNTER (OUTPATIENT)
Age: 35
End: 2020-10-21

## 2020-10-21 ENCOUNTER — APPOINTMENT (OUTPATIENT)
Dept: SURGERY | Facility: CLINIC | Age: 35
End: 2020-10-21
Payer: COMMERCIAL

## 2020-10-21 VITALS
OXYGEN SATURATION: 98 % | HEART RATE: 69 BPM | TEMPERATURE: 98.2 F | SYSTOLIC BLOOD PRESSURE: 150 MMHG | HEIGHT: 70 IN | DIASTOLIC BLOOD PRESSURE: 94 MMHG | WEIGHT: 225 LBS | BODY MASS INDEX: 32.21 KG/M2

## 2020-10-21 PROCEDURE — 99024 POSTOP FOLLOW-UP VISIT: CPT

## 2020-10-21 NOTE — REASON FOR VISIT
[Follow-Up] : a follow-up visit [Spouse] : spouse [FreeTextEntry1] : s/p lapaaroscopic cholecystectomy

## 2020-12-30 ENCOUNTER — TRANSCRIPTION ENCOUNTER (OUTPATIENT)
Age: 35
End: 2020-12-30

## 2021-06-10 ENCOUNTER — TRANSCRIPTION ENCOUNTER (OUTPATIENT)
Age: 36
End: 2021-06-10

## 2021-10-11 ENCOUNTER — TRANSCRIPTION ENCOUNTER (OUTPATIENT)
Age: 36
End: 2021-10-11

## 2022-06-15 ENCOUNTER — NON-APPOINTMENT (OUTPATIENT)
Age: 37
End: 2022-06-15

## 2023-03-12 ENCOUNTER — NON-APPOINTMENT (OUTPATIENT)
Age: 38
End: 2023-03-12

## 2023-05-04 NOTE — ED ADULT NURSE NOTE - BREATHING, MLM
Patient is requesting refills of valtrex 500 mg.  Patient takes 1 tablet daily for suppressive therapy with good control.  Patient reports no outbreaks in the last year.  Medication has not been discussed since 2020, please sign if okay to send.     Last ov:  6/20/22  Next ov:  6/28/23    Assessment and Plan from 6/22/20  3.  History of herpes simplex virus, well controlled. Continue with daily suppressive dosing of Valtrex 500mg once daily, 90 tablets with 3 refills given.  
Spontaneous, unlabored and symmetrical

## 2023-11-18 NOTE — ED ADULT TRIAGE NOTE - TEMPERATURE IN FAHRENHEIT (DEGREES F)
98.6
You can access the FollowMyHealth Patient Portal offered by [facility] by registering at the following website: [email or web address]. By joining [employer]’s FollowMyHealth portal, you will also be able to view your health information using other applications (apps) compatible with our system.

## 2023-11-28 NOTE — ED PROVIDER NOTE - PATIENT PORTAL LINK FT
Called and spoke with pt just now  Reviewed Lipid Panel results completed on 11/22/23  Worsening hyperlipidemia and hypertriglyceridemia after pt has discontinued Atorvastatin 20 mg/day a year ago  Due to lower legs and feet night cramps  New plan is to decreased Atorvastatin to 10 mg/day  And add Zetia 10 mg/day  plus follow low fat/cholesterol diet and staying physically active  Repeat Lipid Panel in 3-4 months upon return from [place]  Pt verbalized understanding and agreed with the plan  Dejah     
You can access the FollowMyHealth Patient Portal offered by [facility] by registering at the following website: [email or web address]. By joining [employer]’s FollowMyHealth portal, you will also be able to view your health information using other applications (apps) compatible with our system.

## 2023-12-13 ENCOUNTER — NON-APPOINTMENT (OUTPATIENT)
Age: 38
End: 2023-12-13

## 2024-01-05 ENCOUNTER — TRANSCRIPTION ENCOUNTER (OUTPATIENT)
Age: 39
End: 2024-01-05

## 2024-01-11 ENCOUNTER — APPOINTMENT (OUTPATIENT)
Dept: MRI IMAGING | Facility: CLINIC | Age: 39
End: 2024-01-11
Payer: COMMERCIAL

## 2024-01-11 ENCOUNTER — APPOINTMENT (OUTPATIENT)
Dept: ORTHOPEDIC SURGERY | Facility: CLINIC | Age: 39
End: 2024-01-11
Payer: OTHER MISCELLANEOUS

## 2024-01-11 ENCOUNTER — TRANSCRIPTION ENCOUNTER (OUTPATIENT)
Age: 39
End: 2024-01-11

## 2024-01-11 DIAGNOSIS — E78.00 PURE HYPERCHOLESTEROLEMIA, UNSPECIFIED: ICD-10-CM

## 2024-01-11 DIAGNOSIS — I10 ESSENTIAL (PRIMARY) HYPERTENSION: ICD-10-CM

## 2024-01-11 PROBLEM — Z00.00 ENCOUNTER FOR PREVENTIVE HEALTH EXAMINATION: Status: ACTIVE | Noted: 2024-01-11

## 2024-01-11 PROCEDURE — 73221 MRI JOINT UPR EXTREM W/O DYE: CPT | Mod: LT

## 2024-01-11 PROCEDURE — 73010 X-RAY EXAM OF SHOULDER BLADE: CPT | Mod: LT

## 2024-01-11 PROCEDURE — 73030 X-RAY EXAM OF SHOULDER: CPT | Mod: LT

## 2024-01-11 PROCEDURE — 73721 MRI JNT OF LWR EXTRE W/O DYE: CPT | Mod: LT

## 2024-01-11 PROCEDURE — 99204 OFFICE O/P NEW MOD 45 MIN: CPT

## 2024-01-11 PROCEDURE — 73562 X-RAY EXAM OF KNEE 3: CPT | Mod: LT

## 2024-01-11 RX ORDER — NAPROXEN 500 MG/1
500 TABLET ORAL
Qty: 60 | Refills: 0 | Status: ACTIVE | COMMUNITY
Start: 2024-01-11 | End: 1900-01-01

## 2024-01-11 RX ORDER — LOSARTAN POTASSIUM 100 MG/1
TABLET, FILM COATED ORAL
Refills: 0 | Status: ACTIVE | COMMUNITY

## 2024-01-11 NOTE — WORK
[Sprain/Strain] : sprain/strain [Was the competent medical cause of the injury] : was the competent medical cause of the injury [Are consistent with the injury] : are consistent with the injury [Consistent with my objective findings] : consistent with my objective findings [Total (100%)] : total (100%) [Does not reveal pre-existing condition(s) that may affect treatment/prognosis] : does not reveal pre-existing condition(s) that may affect treatment/prognosis [Patient] : patient [No Rx restrictions] : No Rx restrictions. [I provided the services listed above] :  I provided the services listed above.

## 2024-01-12 ENCOUNTER — TRANSCRIPTION ENCOUNTER (OUTPATIENT)
Age: 39
End: 2024-01-12

## 2024-01-12 NOTE — HISTORY OF PRESENT ILLNESS
[de-identified] : The patient is a 38 year old right hand dominant male  who presents today complaining of left shoulder and left lower leg pain.  Date of Injury/Onset: 1/5/24 Pain:    At Rest: K: 4/10 S: 6/10 With Activity: K:8/10 S: 8.5/10 Mechanism of injury: while standing on a ladder the gate closed and knocked over the latter causing him to grab onto the pole and when he fell he landed on his left side This is a Work Related Injury being treated under Worker's Compensation. This is NOT an athletic injury occurring associated with an interscholastic or organized sports team. Quality of symptoms: Knee: aching, throbbing Shoulder: sharp Improves with: rest, NSAIDs Worse with: Knee: walking/standing, Shoulder: any shoulder movement Prior Treatment: Daniel BANG Prior Imaging: Xrays Out of work/sport: currently out of work School/Sport/Position/Occupation:   Additional Information: None

## 2024-01-12 NOTE — IMAGING
[Left] : left knee [AP] : anteroposterior [Lateral] : lateral [[place]] : skyline [de-identified] : The patient is a well appearing 38 year  old male of their stated age.  Neck is supple & nontender to palpation. Negative Spurling's test.  Ambulates with a normal gait.   Effected Knee: LEFT  ROM:  0-145 degrees  Lachman: Negative  Pivot Shift: Negative  Anterior Drawer: Negative  Posterior Drawer / Sag:Negative  Varus Stress 0 degrees: Stable  Varus Stress 30 degrees: Stable  Valgus Stress 0 degrees: Stable  Valgus Stress 30 degrees: Stable  Medial Ziyad: EQUIVOCAL  Lateral Ziyad: POSITIVE  Patella Glide: 2+  Patella Apprehension: Negative  Patella Grind: Negative  Pes [place] Valgus: Negative  Pes Cavus: Negative   Palpation:  Medial Joint Line: MILDLY TENDER  Lateral Joint Line: TENDER  Medial Collateral Ligament: Nontender  Lateral Collateral Ligament/PLC: Nontender  Distal Femur: Nontender  Proximal Tibia: Nontender  Tibial Tubercle: Nontender and PROMINANT  Gerdy's Tubercle: Nontender  Distal Pole Patella: Nontender  Quadriceps Tendon: Nontender &  Intact  Patella Tendon: Nontender &  Intact  Medial Femoral Condyle: Nontender  Medial Distal Hamstring/PES: Nontender  Lateral Distal Hamstring: Nontender & Stable  Iliotibial Band: Nontender  Medial Patellofemoral Ligament: Nontender  Adductor: Nontender  Proximal GSC-Plantaris: Nontender  Calf: Supple & Nontender   Inspection:  Deformity: No  Erythema: No  Ecchymosis: No  Abrasions: No  Effusion: MILD  Prepatella Bursitis: No  Neurologic Exam:  Sensation L4-S1: Grossly Intact  Motor Exam:  Quadriceps: 5 out of 5  Hamstrings: 5 out of 5  EHL: 5 out of 5  FHL: 5 out of 5  TA: 5 out of 5  GS: 5 out of 5  Circulatory/Pulses:  Dorsalis Pedis: 2+  Posterior Tibialis: 2+  Additional Pertinent Findings: None   Contralateral Knee:                           	  ROM: 0-145 degrees  Other Pertinent Findings: None   >>>>>>>>>>>>>>>>>>>>>>>>>>>>>>>>>>>>>>>>>>>>>>>>>>>>>>>>  Effected Shoulder: LEFT  Inspection:  Scapula Winging: Negative  Deformity: None  Erythema: None  Ecchymosis: None  Abrasions: None  Effusion: None   Range of Motion:  Active Forward Flexion: 165 degrees   Active Abduction: 165 degrees   Passive Forward Flexion: 165 degrees   Passive Abduction: 165 degrees   ER @ 90 degrees: 90 degrees  IR @ 90 degrees: 40 degrees WITH PAIN  ER @ 0 degrees: 40 degrees  Motor Exam:  Forward Flexion: 5- out of 5  Flexion Plane of Scapula: 5- out of 5  Abduction: 4 out of 5  Internal Rotation: 5 out of 5  External Rotation: 4+ out of 5  Distal Motor Strength: 5 out of 5   Stability Testing:  Anterior: 1+  Posterior: 1+  Sulcus N: 1+  Sulcus ER: 1+  Provocative Tests:  Drop Arm: Negative  Impingement: POSITIVE  [place]: Negative  X-Arm Adduction: Negative  Belly Press: Negative  Bear Hug: Negative  Lift Off: Negative  Apprehension: Negative  Relocation: Negative  Posterior Load & Shift: Negative   Palpation:  AC Joint: Nontender  Clavicle: Nontender  SC Joint: Nontender  Bicepital Groove: TENDER  Coracoid Process: Nontender  Pectoralis Minor Tendon: Nontender  Pectoralis Major Tendon: Nontender & palpably intact  Latissimus Dorsi: Nontender   Proximal Humerus: Nontender  Scapula Body: Nontender  Medial Scapula Boarder: Nontender  Scapula Spine: Nontender   Neurologic Exam: Sensation to Light Touch:  Axillary: Grossly intact  Ulnar: Grossly intact  Radial: Grossly intact  Median: Grossly intact  Other:  N/A  Circulatory/Pulses:  Ulnar: 2+  Radial: 2+  Other Pertinent Findings: None   Contralateral Shoulder  Range of Motion:  Active Forward Flexion: 180 degrees   Active Abduction: 180 degrees   Passive Forward Flexion: 180 degrees  Passive Abduction: 180 degrees   ER @ 90 degrees: 90 degrees  IR @ 90 degrees: 45 degrees  ER @ 0 degrees: 50 degrees  Motor Exam:  Forward Flexion: 5 out of 5  Flexion Plane of Scapula: 5 out of 5  Abduction: 5 out of 5  Internal Rotation: 5 out of 5  External Rotation: 5 out of 5  Distal Motor Strength: 5 out of 5  Stability Testing:  Anterior: 1+  Posterior: 1+  Sulcus N: 1+  Sulcus ER: 1+  Other Pertinent Findings: None   Assessment: The patient is a 38 year old male with left shoulder and left knee pain and radiographic and physical exam findings consistent with possible lateral meniscus tear vs bone contusion and possible rotator cuff tear vs shoulder instability.    The patients condition is acute  Documents/Results Reviewed Today: X-Ray left knee and X-Ray left shoulder/scapula  Tests/Studies Independently Interpreted Today: X-Ray left knee reveals evidence of Osgood-Schlatters, otherwise benign. X-Ray left shoulder/scapula reveals evidence of mild Hill-Sachs lesion. Pertinent findings include:  LEFT KNEE: 0-140, mild effusion, LJLT, +lateral ziyad, mild MJLT, equivocal medial ziyad, prominent however nontender tibial tubercle. LEFT SHOULDER: 165/165/90/40/40, 4/5 ABD, 4+/5 ER, 5-/5 FF and FSP. pain with IR, tender bicipital groove, +impingement, 1+ stability in all directions.  Confounding medical conditions/concerns: None  Plan: Due to worsening pain and instability with catching/locking mechanical symptoms s/p acute work-related injury, patient will obtain MRI left knee to evaluate for possible lateral meniscus tear. Considering his shoulder pain and weakness with associated mechanical symptoms s/p acute work-related injury, patient will also obtain an MRI shoulder knee to evaluate for possible rotator cuff tear vs shoulder dislocation. The has proven refractory to all previous conservative treatments including but not limited to home exercises, activity modifications, rest, ice, oral anti-inflammatories etc. In the interim, we reviewed appropriate use of OTC anti-inflammatories as needed for pain, inflammation, and discomfort. Modify activity as discussed.  Tests Ordered: MRI left shoulder and MRI left knee Prescription Medications Ordered: Discussed use of OTC NSAIDs including but not limited to Aleve, Motrin, Tylenol Advil, etc. Braces/DME Ordered: None  Activity/Work/Sports Status: OOW  Additional Instructions: None Follow-Up: After MRI's   The patient's current medication management of their orthopedic diagnosis was reviewed today: (1) We discussed a comprehensive treatment plan that included possible pharmaceutical management involving the use of prescription strength medications including but not limited to options such as oral Naprosyn 500mg BID, once daily Meloxicam 15 mg, or 500-650 mg Tylenol versus over the counter oral medications and topical prescription NSAID Pennsaid vs over the counter Voltaren gel.  Based on our extensive discussion, the patient declined prescription medication and will use over the counter Advil, Alleve, Voltaren Gel or Tylenol as directed. (2) There is a moderate risk of morbidity with further treatment, especially from use of prescription strength medications and possible side effects of these medications which include upset stomach with oral medications, skin reactions to topical medications and cardiac/renal issues with long term use. (3) I recommended that the patient follow-up with their medical physician to discuss any significant specific potential issues with long term medication use such as interactions with current medications or with exacerbation of underlying medical comorbidities. (4) The benefits and risks associated with use of injectable, oral or topical, prescription and over the counter anti-inflammatory medications were discussed with the patient. The patient voiced understanding of the risks including but not limited to bleeding, stroke, kidney dysfunction, heart disease, and were referred to the black box warning label for further information.   IGinger attest that this documentation has been prepared under the direction and in the presence of Provider Dr. Dusty Del Toro.   The documentation recorded by the scribe accurately reflects the services IDr. Dusty, personally performed and the decisions made by me. [FreeTextEntry1] : X-Ray left shoulder/scapula reveals evidence of mild Hill-Sachs lesion. [FreeTextEntry5] : Benign.  [FreeTextEntry9] : X-Ray left knee reveals evidence of Osgood-Schlatters, otherwise benign.

## 2024-01-15 ENCOUNTER — APPOINTMENT (OUTPATIENT)
Dept: ORTHOPEDIC SURGERY | Facility: CLINIC | Age: 39
End: 2024-01-15
Payer: OTHER MISCELLANEOUS

## 2024-01-15 VITALS — HEIGHT: 70 IN | WEIGHT: 240 LBS | BODY MASS INDEX: 34.36 KG/M2

## 2024-01-15 PROCEDURE — J3490M: CUSTOM

## 2024-01-15 PROCEDURE — L1833: CPT | Mod: LT

## 2024-01-15 PROCEDURE — 20610 DRAIN/INJ JOINT/BURSA W/O US: CPT | Mod: LT

## 2024-01-15 PROCEDURE — L2397: CPT | Mod: LT

## 2024-01-15 PROCEDURE — 99214 OFFICE O/P EST MOD 30 MIN: CPT | Mod: 25

## 2024-01-15 RX ORDER — NAPROXEN 500 MG/1
500 TABLET ORAL
Qty: 60 | Refills: 0 | Status: ACTIVE | COMMUNITY
Start: 2024-01-15 | End: 1900-01-01

## 2024-01-16 NOTE — HISTORY OF PRESENT ILLNESS
[de-identified] : The patient is a 38 year old right hand dominant male  who presents today complaining of left shoulder and left lower leg pain.  Date of Injury/Onset: 1/5/24 Pain:    At Rest: K: 4/10 S: 6/10 With Activity: K:8/10 S: 8.5/10 Mechanism of injury: while standing on a ladder the gate closed and knocked over the latter causing him to grab onto the pole and when he fell he landed on his left side This is a Work Related Injury being treated under Worker's Compensation. This is NOT an athletic injury occurring associated with an interscholastic or organized sports team. Quality of symptoms: Knee: aching, throbbing Shoulder: sharp Improves with: rest, NSAIDs Worse with: Knee: walking/standing, Shoulder: any shoulder movement Treatment/Imaging/Studies Since Last Visit: MRI 	Reports Available For Review Today: MRI Report Out of work/sport: currently out of work School/Sport/Position/Occupation:   Changes since last visit: Patient reports no change in symptoms since last visit, here to review MRI results  Additional Information: None

## 2024-01-16 NOTE — IMAGING
[de-identified] : The patient is a well appearing 38 year  old male of their stated age.  Neck is supple & nontender to palpation. Negative Spurling's test.  Ambulates with a normal gait.   Effected Knee: LEFT  ROM:  0-145 degrees  Lachman: Negative  Pivot Shift: Negative  Anterior Drawer: Negative  Posterior Drawer / Sag:Negative  Varus Stress 0 degrees: Stable  Varus Stress 30 degrees: Stable  Valgus Stress 0 degrees: Stable  Valgus Stress 30 degrees: Stable  Medial Ziyad: EQUIVOCAL  Lateral Ziyad: POSITIVE  Patella Glide: 2+  Patella Apprehension: Negative  Patella Grind: Negative  Pes [place] Valgus: Negative  Pes Cavus: Negative   Palpation:  Medial Joint Line: MILDLY TENDER  Lateral Joint Line: TENDER  Medial Collateral Ligament: Nontender  Lateral Collateral Ligament/PLC: Nontender  Distal Femur: Nontender  Proximal Tibia: Nontender  Tibial Tubercle: Nontender and PROMINANT  Gerdy's Tubercle: Nontender  Distal Pole Patella: Nontender  Quadriceps Tendon: Nontender &  Intact  Patella Tendon: Nontender &  Intact  Medial Femoral Condyle: Nontender  Medial Distal Hamstring/PES: Nontender  Lateral Distal Hamstring: Nontender & Stable  Iliotibial Band: Nontender  Medial Patellofemoral Ligament: Nontender  Adductor: Nontender  Proximal GSC-Plantaris: Nontender  Calf: Supple & Nontender   Inspection:  Deformity: No  Erythema: No  Ecchymosis: No  Abrasions: No  Effusion: MILD  Prepatella Bursitis: No  Neurologic Exam:  Sensation L4-S1: Grossly Intact  Motor Exam:  Quadriceps: 5 out of 5  Hamstrings: 5 out of 5  EHL: 5 out of 5  FHL: 5 out of 5  TA: 5 out of 5  GS: 5 out of 5  Circulatory/Pulses:  Dorsalis Pedis: 2+  Posterior Tibialis: 2+  Additional Pertinent Findings: None   Contralateral Knee:                           	  ROM: 0-145 degrees  Other Pertinent Findings: None   >>>>>>>>>>>>>>>>>>>>>>>>>>>>>>>>>>>>>>>>>>>>>>>>>>>>>>>>  Effected Shoulder: LEFT  Inspection:  Scapula Winging: Negative  Deformity: None  Erythema: None  Ecchymosis: None  Abrasions: None  Effusion: None   Range of Motion:  Active Forward Flexion: 165 degrees   Active Abduction: 165 degrees   Passive Forward Flexion: 165 degrees   Passive Abduction: 165 degrees   ER @ 90 degrees: 90 degrees  IR @ 90 degrees: 40 degrees WITH PAIN  ER @ 0 degrees: 40 degrees  Motor Exam:  Forward Flexion: 5- out of 5  Flexion Plane of Scapula: 5- out of 5  Abduction: 4 out of 5  Internal Rotation: 5 out of 5  External Rotation: 4+ out of 5  Distal Motor Strength: 5 out of 5   Stability Testing:  Anterior: 1+  Posterior: 1+  Sulcus N: 1+  Sulcus ER: 1+  Provocative Tests:  Drop Arm: Negative  Impingement: POSITIVE  [place]: Negative  X-Arm Adduction: Negative  Belly Press: Negative  Bear Hug: Negative  Lift Off: Negative  Apprehension: Negative  Relocation: Negative  Posterior Load & Shift: Negative   Palpation:  AC Joint: Nontender  Clavicle: Nontender  SC Joint: Nontender  Bicepital Groove: TENDER  Coracoid Process: Nontender  Pectoralis Minor Tendon: Nontender  Pectoralis Major Tendon: Nontender & palpably intact  Latissimus Dorsi: Nontender   Proximal Humerus: Nontender  Scapula Body: Nontender  Medial Scapula Boarder: Nontender  Scapula Spine: Nontender   Neurologic Exam: Sensation to Light Touch:  Axillary: Grossly intact  Ulnar: Grossly intact  Radial: Grossly intact  Median: Grossly intact  Other:  N/A  Circulatory/Pulses:  Ulnar: 2+  Radial: 2+  Other Pertinent Findings: None   Contralateral Shoulder  Range of Motion:  Active Forward Flexion: 180 degrees   Active Abduction: 180 degrees   Passive Forward Flexion: 180 degrees  Passive Abduction: 180 degrees   ER @ 90 degrees: 90 degrees  IR @ 90 degrees: 45 degrees  ER @ 0 degrees: 50 degrees  Motor Exam:  Forward Flexion: 5 out of 5  Flexion Plane of Scapula: 5 out of 5  Abduction: 5 out of 5  Internal Rotation: 5 out of 5  External Rotation: 5 out of 5  Distal Motor Strength: 5 out of 5  Stability Testing:  Anterior: 1+  Posterior: 1+  Sulcus N: 1+  Sulcus ER: 1+  Other Pertinent Findings: None   Assessment: The patient is a 38 year old male with left shoulder and left knee pain and radiographic and physical exam findings consistent with left knee bone contusion and left shoulder bursitis   The patients condition is acute  Documents/Results Reviewed Today: MRI left shoulder and MRI left knee Tests/Studies Independently Interpreted Today: MRI left shoulder reveals rotator cuff bursitis with partial tearing of the supraspinatus tendon, inflammation at the biceps. MRI left knee reveals bone marrow edema at the posterior medial tibial plateau, fibula head, and patella.  Pertinent findings include:  LEFT KNEE: 0-140, mild effusion, LJLT, +lateral ziyad, mild MJLT, equivocal medial ziyad, prominent however nontender tibial tubercle. LEFT SHOULDER: 165/165/90/40/40, 4/5 ABD, 4+/5 ER, 5-/5 FF and FSP. pain with IR, tender bicipital groove, +impingement, 1+ stability in all directions.  Confounding medical conditions/concerns: None  Plan: Patient will begin use of Playmaker knee brace and Reparel knee sleeve to ensure stability while returning to activities of daily living. Discussed conservative treatments in the form of injections. Patient elected to receive left shoulder 9/1 CSI. Patient will start physical therapy, HEP, and stretching for both the knee and shoulder. Prescribed patient Naprosyn 500mg BID x 2 weeks, then PRN for pain management and inflammation - use as directed. Modify activity as discussed. Tests Ordered: None  Prescription Medications Ordered: Naprosyn 500mg Braces/DME Ordered: Playmaker knee brace and Reparel knee sleeve  Activity/Work/Sports Status: OOW  Additional Instructions: None Follow-Up: 6 weeks    Procedure Note: Musculoskeletal Injection  Diagnosis:  Left shoulder rotator cuff bursitis Procedure:  Left shoulder, subacromial, 9/1 CSI   Indication:  The patient has had persistent pain despite conservative treatment.  Risks, benefits and alternatives to procedure were discussed; all questions were answered to the patient's apparent satisfaction and informed consent obtained.  The patient denied prior problems with local anesthetics, injectable cortisones, chicken allergy, coagulopathy and no relevant drug or preservative allergies or sensitivities.  The area of injection was prepared in a sterile fashion.  Prior to injection a 'Time Out' was conducted in accordance with [employer] policy and the site and nature of procedure verified with the patient.   Procedure:  The procedure was carried out utilizing sterile technique from a subacromial arthroscopic portal position.  0cc of clear synovial fluid was aspirated.  The specimen:  (X) appeared benign and was discarded  ( ) was sent for Culture / Cell Count / Crystal analysis / [_].]   Injection into the target area with care taken to aspirate frequently to minimize the risk of intravascular injection was performed with:  ( ) 1cc of Depomedrol (80mg/ml)  (X) 1cc of Dexamethasone (10mg/ml)  ( ) 1cc of Toradol (30mg/ml)  (X) 9cc of 0.5% Bupivacaine  ( ) 1cc of 1% Lidocaine  ( ) 5cc of 32mg Zilretta, prepared and diluted per  instructions  ( ) 2 cc of Hylan G-F 20 (Synvisc) 16mg/2ml  ( ) 6 cc of Hylan G-F 20 (SynvisoOne) 16mg/2ml  ( ) 2cc of Euflexxa  ( ) 2cc of Orthovisc  ( ) 2cc of GelOne  ( ) 3cc of Durolane (20mg/ml)   Patient tolerated the procedure well and direct pressure was applied for hemostasis. The patient was reminded of potential post-injection risks including, but not limited to, delayed hypersensitivity reactions and/or infection.  The patient verified that they had the office and the Emergency Room's contact information if any problems should arise.  After several minutes, the patient informed me that they felt fine and was released from the office.   The patient's current medication management of their orthopedic diagnosis was reviewed today: (1) We discussed a comprehensive treatment plan that included possible pharmaceutical management involving the use of prescription strength medications including but not limited to options such as oral Naprosyn 500mg BID, once daily Meloxicam 15 mg, or 500-650 mg Tylenol versus over the counter oral medications and topical prescription NSAID Pennsaid vs over the counter Voltaren gel.  Based on our extensive discussion, the patient was prescribed Naprosyn 500mg BID for two weeks.  It will then be used PRN for pain, inflammation and discomfort. (2) There is a moderate risk of morbidity with further treatment, especially from use of prescription strength medications and possible side effects of these medications which include upset stomach with oral medications, skin reactions to topical medications and cardiac/renal issues with long term use. (3) I recommended that the patient follow-up with their medical physician to discuss any significant specific potential issues with long term medication use such as interactions with current medications or with exacerbation of underlying medical comorbidities. (4) The benefits and risks associated with use of injectable, oral or topical, prescription and over the counter anti-inflammatory medications were discussed with the patient. The patient voiced understanding of the risks including but not limited to bleeding, stroke, kidney dysfunction, heart disease, and were referred to the black box warning label for further information.  I, Fatuma Birmingham, attest that this documentation has been prepared under the direction and in the presence of Provider Dr. Dusty Del Toro.  The documentation recorded by the scribe accurately reflects the services I, Dr. Dusty Del Toro, personally performed and the decisions made by me.

## 2024-01-16 NOTE — DATA REVIEWED
Regarding: Migraines  ----- Message from Chastity Mckeon sent at 2/3/2023  7:47 AM CST -----  Patient Name: Craig Irwin    Full Name of Provider seen for current symptoms:Anne Olivares MD    Symptoms: severe migraines for 3 days now -seeks advice-     Pregnant (females aged 13-60. If Yes, how long?):no    Call Back #:[phone]    Clinic Site / Call Center Account # for provider seen for current symptoms:: [facility]    Which State are you currently located in? (enter State name in Summary field): [place]    Patients needing callback from the RN are informed of the following:   Please be aware the return phone call may come from an unidentified phone number or out of state phone number. Also keep in mind that call back times vary based on call volumes.  If your condition becomes life threatening while you wait for a callback, you should seek immediate medical assistance by calling 911 or going to the Emergency Department for evaluation.     [Left] : left [Knee] : knee [MRI] : MRI [Shoulder] : shoulder [Report was reviewed and noted in the chart] : The report was reviewed and noted in the chart [I independently reviewed and interpreted images and report] : I independently reviewed and interpreted images and report [I reviewed the films/CD and additionally noted] : I reviewed the films/CD and additionally noted [FreeTextEntry1] :  bone marrow edema at the posterior medial tibial plateau, fibula head, and patella.  [FreeTextEntry2] : rotator cuff bursitis with partial tearing of the supraspinatus tendon, inflammation at the biceps.

## 2024-02-12 ENCOUNTER — APPOINTMENT (OUTPATIENT)
Dept: ORTHOPEDIC SURGERY | Facility: CLINIC | Age: 39
End: 2024-02-12
Payer: OTHER MISCELLANEOUS

## 2024-02-12 VITALS — HEIGHT: 70 IN | WEIGHT: 240 LBS | BODY MASS INDEX: 34.36 KG/M2

## 2024-02-12 PROCEDURE — 99213 OFFICE O/P EST LOW 20 MIN: CPT

## 2024-02-12 NOTE — IMAGING
[de-identified] : The patient is a well appearing 38 year  old male of their stated age.  Neck is supple & nontender to palpation. Negative Spurling's test.  Ambulates with a normal gait.   Effected Knee: LEFT  ROM:  0-145 degrees  Lachman: Negative  Pivot Shift: Negative  Anterior Drawer: Negative  Posterior Drawer / Sag:Negative  Varus Stress 0 degrees: Stable  Varus Stress 30 degrees: Stable  Valgus Stress 0 degrees: Stable  Valgus Stress 30 degrees: Stable  Medial Ziyad: Negative   Lateral Ziyad: EQUIVOCAL  Patella Glide: 2+  Patella Apprehension: Negative  Patella Grind: Negative  Pes [place] Valgus: Negative  Pes Cavus: Negative   Palpation:  Medial Joint Line: Nontender  Lateral Joint Line: TENDER  Medial Collateral Ligament: Nontender  Lateral Collateral Ligament/PLC: Nontender  Distal Femur: Nontender  Proximal Tibia: Nontender  Tibial Tubercle: Nontender  Gerdy's Tubercle: Nontender  Distal Pole Patella: Nontender  Quadriceps Tendon: Nontender &  Intact  Patella Tendon: Nontender &  Intact  Medial Femoral Condyle: Nontender  Medial Distal Hamstring/PES: Nontender  Lateral Distal Hamstring: Nontender & Stable  Iliotibial Band: Nontender  Medial Patellofemoral Ligament: Nontender  Adductor: Nontender  Proximal GSC-Plantaris: Nontender  Calf: Supple & Nontender   Inspection:  Deformity: No  Erythema: No  Ecchymosis: No  Abrasions: No  Effusion: MILD  Prepatella Bursitis: No  Neurologic Exam:  Sensation L4-S1: Grossly Intact  Motor Exam:  Quadriceps: 5 out of 5  Hamstrings: 5 out of 5  EHL: 5 out of 5  FHL: 5 out of 5  TA: 5 out of 5  GS: 5 out of 5  Circulatory/Pulses:  Dorsalis Pedis: 2+  Posterior Tibialis: 2+  Additional Pertinent Findings: None   Contralateral Knee:                           	  ROM: 0-145 degrees  Other Pertinent Findings: None  >>>>>>>>>>>>>>>>>>>>>>>>>>>>>>>>>>>>>>>>>>>>>>>>>>>>>>>> Effected Shoulder: LEFT  Inspection:  Scapula Winging: Negative  Deformity: None  Erythema: None  Ecchymosis: None  Abrasions: None  Effusion: None   Range of Motion:  Active Forward Flexion: 180 degrees   Active Abduction: 180 degrees   Passive Forward Flexion: 180 degrees   Passive Abduction: 180 degrees   ER @ 90 degrees: 90 degrees  IR @ 90 degrees: 40 degrees WITH PAIN  ER @ 0 degrees: 40 degrees  Motor Exam:  Forward Flexion: 5- out of 5  Flexion Plane of Scapula: 5- out of 5  Abduction: 5- out of 5  Internal Rotation: 5- out of 5  External Rotation: 5- out of 5  Distal Motor Strength: 5 out of 5   Stability Testing:  Anterior: 1+  Posterior: 1+  Sulcus N: 1+  Sulcus ER: 1+  Provocative Tests:  Drop Arm: Negative  Impingement: POSITIVE  [place]: Negative  X-Arm Adduction: Negative  Belly Press: Negative  Bear Hug: Negative  Lift Off: Negative  Apprehension: Negative  Relocation: Negative  Posterior Load & Shift: Negative   Palpation:  AC Joint: Nontender  Clavicle: Nontender  SC Joint: Nontender  Bicepital Groove: TENDER  Coracoid Process: Nontender  Pectoralis Minor Tendon: Nontender  Pectoralis Major Tendon: Nontender & palpably intact  Latissimus Dorsi: Nontender   Proximal Humerus: Nontender  Scapula Body: Nontender  Medial Scapula Boarder: Nontender  Scapula Spine: Nontender   Neurologic Exam: Sensation to Light Touch:  Axillary: Grossly intact  Ulnar: Grossly intact  Radial: Grossly intact  Median: Grossly intact  Other:  N/A  Circulatory/Pulses:  Ulnar: 2+  Radial: 2+  Other Pertinent Findings: None   Contralateral Shoulder  Range of Motion:  Active Forward Flexion: 180 degrees   Active Abduction: 180 degrees   Passive Forward Flexion: 180 degrees  Passive Abduction: 180 degrees   ER @ 90 degrees: 90 degrees  IR @ 90 degrees: 45 degrees  ER @ 0 degrees: 50 degrees  Motor Exam:  Forward Flexion: 5 out of 5  Flexion Plane of Scapula: 5 out of 5  Abduction: 5 out of 5  Internal Rotation: 5 out of 5  External Rotation: 5 out of 5  Distal Motor Strength: 5 out of 5  Stability Testing:  Anterior: 1+  Posterior: 1+  Sulcus N: 1+  Sulcus ER: 1+  Other Pertinent Findings: None   Assessment: The patient is a 38 year old male with left shoulder and left knee pain and radiographic and physical exam findings consistent with left knee bone contusion and left shoulder bursitis   The patients condition is acute  Documents/Results Reviewed Today: None  Tests/Studies Independently Interpreted Today: None  Pertinent findings include:  LEFT KNEE: 0-140, mild effusion, LJLT, equivocal lateral ziyad. LEFT SHOULDER: 180/180/90/40/40, 5-/5 strength throughout, tender bicipital groove, +impingement, 1+ stability in all directions.  Confounding medical conditions/concerns: None  Plan: Patient will continue use of Playmaker knee brace and Reparel knee sleeve to ensure stability while returning to activities of daily living. Patient experienced moderate relief from the corticosteroid injection administered at prior visit. Patient will continue physical therapy, HEP, and stretching for both the knee and shoulder. THIS SERVES AS A LETTER OF MEDICALLY NECESSITY THAT THE PATIENT START PHYSICAL THERAPY FOR THE KNEE IN ORDER TO PREVENT SURGICAL INTERVENTION OR REINJURY, AND FOR THE PATIENT TO RETURN TO THE DUTIES REQUIRED BY HIS PLACE OF EMPLOYMENT. Continue use of previously prescribed Naprosyn 500mg for pain management and inflammation - use as directed. In addition, recommended utilizing OTC topical Voltaren Gel on affected areas. Modify activity as discussed. Tests Ordered: None  Prescription Medications Ordered: Naprosyn 500mg Braces/DME Ordered: None  Activity/Work/Sports Status: OOW  Additional Instructions: Continue use of Playmaker and Reparel knee sleeve Follow-Up: 4 weeks    The patient's current medication management of their orthopedic diagnosis was reviewed today: (1) The patient will continue with the PRN use of their prescribed anti-inflammatory. (2) There is a moderate risk of morbidity with further treatment, especially from use of prescription strength medications and possible side effects of these medications which include upset stomach with oral medications, skin reactions to topical medications and cardiac/renal issues with long term use. (3) I recommended that the patient follow-up with their medical physician to discuss any significant specific potential issues with long term medication use such as interactions with current medications or with exacerbation of underlying medical comorbidities. (4) The benefits and risks associated with use of injectable, oral or topical, prescription and over the counter anti-inflammatory medications were discussed with the patient. The patient voiced understanding of the risks including but not limited to bleeding, stroke, kidney dysfunction, heart disease, and were referred to the black box warning label for further information.  [place], attest that this documentation has been prepared under the direction and in the presence of Provider Dr. Dusty Del Toro.  The documentation recorded by the scribe accurately reflects the service Mary Beth GARCIA PA-C, personally performed and the decisions made by me. The patient was seen by me under the direct supervision of Dr. Dusty Del Toro.

## 2024-02-12 NOTE — HISTORY OF PRESENT ILLNESS
[de-identified] : The patient is a 38 year old right hand dominant male  who presents today complaining of left shoulder and left lower leg pain.  Date of Injury/Onset: 1/5/24 Pain:    At Rest: K: 2/10 S: 3/10 With Activity: K:2/10 S: 5-6/10 Mechanism of injury: while standing on a ladder the gate closed and knocked over the latter causing him to grab onto the pole and when he fell he landed on his left side This is a Work Related Injury being treated under Worker's Compensation. This is NOT an athletic injury occurring associated with an interscholastic or organized sports team. Quality of symptoms: Knee: aching, throbbing Shoulder: sharp Improves with: rest, NSAIDs Worse with: Knee: walking/standing, Shoulder: any shoulder movement, leaning on left side Treatment/Imaging/Studies Since Last Visit: MRI 	Reports Available For Review Today: MRI Report Out of work/sport: currently out of work School/Sport/Position/Occupation:   Changes since last visit: Patient reports improvement in ROM with PT but continues to have pain Additional Information: None

## 2024-03-11 ENCOUNTER — APPOINTMENT (OUTPATIENT)
Dept: HEPATOLOGY | Facility: CLINIC | Age: 39
End: 2024-03-11
Payer: COMMERCIAL

## 2024-03-11 ENCOUNTER — APPOINTMENT (OUTPATIENT)
Dept: ORTHOPEDIC SURGERY | Facility: CLINIC | Age: 39
End: 2024-03-11
Payer: OTHER MISCELLANEOUS

## 2024-03-11 ENCOUNTER — NON-APPOINTMENT (OUTPATIENT)
Age: 39
End: 2024-03-11

## 2024-03-11 VITALS — BODY MASS INDEX: 34.36 KG/M2 | WEIGHT: 240 LBS | HEIGHT: 70 IN

## 2024-03-11 DIAGNOSIS — R74.8 ABNORMAL LEVELS OF OTHER SERUM ENZYMES: ICD-10-CM

## 2024-03-11 PROCEDURE — 76981 USE PARENCHYMA: CPT

## 2024-03-11 PROCEDURE — 99213 OFFICE O/P EST LOW 20 MIN: CPT

## 2024-03-12 PROBLEM — R74.8 ELEVATED LIVER ENZYMES: Status: ACTIVE | Noted: 2024-03-12

## 2024-03-12 NOTE — HISTORY OF PRESENT ILLNESS
[de-identified] : The patient is a 38 year old right hand dominant male  who presents today complaining of left shoulder and left lower leg pain.  Date of Injury/Onset: 1/5/24 Pain:    At Rest: K: 3/10 S: 3/10 With Activity: K: 4/10 S: 7/10 Mechanism of injury: while standing on a ladder the gate closed and knocked over the latter causing him to grab onto the pole and when he fell he landed on his left side This is a Work Related Injury being treated under Worker's Compensation. This is NOT an athletic injury occurring associated with an interscholastic or organized sports team. Quality of symptoms: Knee: aching, throbbing Shoulder: sharp Improves with: rest, NSAIDs Worse with: Knee: walking/standing, Shoulder: any shoulder movement, leaning on left side, lifting items  Treatment/Imaging/Studies Since Last Visit: PT 2-3x/weekly 	Reports Available For Review Today: none Out of work/sport: currently out of work School/Sport/Position/Occupation:   Changes since last visit: Patient reports seeing improvement with shoulder ROM, but still feels same level of pain since last visit.  Additional Information: None

## 2024-03-12 NOTE — IMAGING
[de-identified] : The patient is a well appearing 38 year  old male of their stated age.  Neck is supple & nontender to palpation. Negative Spurling's test.  Ambulates with a normal gait.   Effected Knee: LEFT  ROM:  0-145 degrees  Lachman: Negative  Pivot Shift: Negative  Anterior Drawer: Negative  Posterior Drawer / Sag:Negative  Varus Stress 0 degrees: Stable  Varus Stress 30 degrees: Stable  Valgus Stress 0 degrees: Stable  Valgus Stress 30 degrees: Stable  Medial Ziyad: Negative   Lateral Ziyad: EQUIVOCAL  Patella Glide: 2+  Patella Apprehension: Negative  Patella Grind: Negative  Pes [place] Valgus: Negative  Pes Cavus: Negative   Palpation:  Medial Joint Line: Nontender  Lateral Joint Line: TENDER  Medial Collateral Ligament: Nontender  Lateral Collateral Ligament/PLC: Nontender  Distal Femur: Nontender  Proximal Tibia: Nontender  Tibial Tubercle: Nontender  Gerdy's Tubercle: Nontender  Distal Pole Patella: Nontender  Quadriceps Tendon: Nontender &  Intact  Patella Tendon: Nontender &  Intact  Medial Femoral Condyle: Nontender  Medial Distal Hamstring/PES: Nontender  Lateral Distal Hamstring: Nontender & Stable  Iliotibial Band: Nontender  Medial Patellofemoral Ligament: Nontender  Adductor: Nontender  Proximal GSC-Plantaris: Nontender  Calf: Supple & Nontender   Inspection:  Deformity: No  Erythema: No  Ecchymosis: No  Abrasions: No  Effusion: MILD  Prepatella Bursitis: No  Neurologic Exam:  Sensation L4-S1: Grossly Intact  Motor Exam:  Quadriceps: 5 out of 5  Hamstrings: 5 out of 5  EHL: 5 out of 5  FHL: 5 out of 5  TA: 5 out of 5  GS: 5 out of 5  Circulatory/Pulses:  Dorsalis Pedis: 2+  Posterior Tibialis: 2+  Additional Pertinent Findings: None   Contralateral Knee:                           	  ROM: 0-145 degrees  Other Pertinent Findings: None  >>>>>>>>>>>>>>>>>>>>>>>>>>>>>>>>>>>>>>>>>>>>>>>>>>>>>>>> Effected Shoulder: LEFT  Inspection:  Scapula Winging: Negative  Deformity: None  Erythema: None  Ecchymosis: None  Abrasions: None  Effusion: None   Range of Motion:  Active Forward Flexion: 180 degrees   Active Abduction: 180 degrees   Passive Forward Flexion: 180 degrees   Passive Abduction: 180 degrees   ER @ 90 degrees: 90 degrees  IR @ 90 degrees: 40 degrees  ER @ 0 degrees: 40 degrees  Motor Exam:  Forward Flexion: 4+ out of 5  Flexion Plane of Scapula: 4+ out of 5  Abduction: 4+ out of 5  Internal Rotation: 5- out of 5  External Rotation: 5- out of 5  Distal Motor Strength: 5 out of 5   Stability Testing:  Anterior: 1+  Posterior: 1+  Sulcus N: 1+  Sulcus ER: 1+  Provocative Tests:  Drop Arm: Negative  Impingement: Negative  [place]: Negative  X-Arm Adduction: Negative  Belly Press: Negative  Bear Hug: Negative  Lift Off: Negative  Apprehension: Negative  Relocation: Negative  Posterior Load & Shift: Negative   Palpation:  AC Joint: Nontender  Clavicle: Nontender  SC Joint: Nontender  Bicepital Groove: TENDER  Coracoid Process: Nontender  Pectoralis Minor Tendon: Nontender  Pectoralis Major Tendon: Nontender & palpably intact  Latissimus Dorsi: Nontender   Proximal Humerus: Nontender  Scapula Body: Nontender  Medial Scapula Boarder: Nontender  Scapula Spine: Nontender   Neurologic Exam: Sensation to Light Touch:  Axillary: Grossly intact  Ulnar: Grossly intact  Radial: Grossly intact  Median: Grossly intact  Other:  N/A  Circulatory/Pulses:  Ulnar: 2+  Radial: 2+  Other Pertinent Findings: None   Contralateral Shoulder  Range of Motion:  Active Forward Flexion: 180 degrees   Active Abduction: 180 degrees   Passive Forward Flexion: 180 degrees  Passive Abduction: 180 degrees   ER @ 90 degrees: 90 degrees  IR @ 90 degrees: 45 degrees  ER @ 0 degrees: 50 degrees  Motor Exam:  Forward Flexion: 5 out of 5  Flexion Plane of Scapula: 5 out of 5  Abduction: 5 out of 5  Internal Rotation: 5 out of 5  External Rotation: 5 out of 5  Distal Motor Strength: 5 out of 5  Stability Testing:  Anterior: 1+  Posterior: 1+  Sulcus N: 1+  Sulcus ER: 1+  Other Pertinent Findings: None   Assessment: The patient is a 38 year old male with left shoulder and left knee pain and radiographic and physical exam findings consistent with left knee bone contusion and left shoulder bursitis   The patients condition is acute  Documents/Results Reviewed Today: Re-reviewed MRI left shoulder  Tests/Studies Independently Interpreted Today: Re-reviewed MRI left shoulder reveals rotator cuff bursitis with partial tearing of the supraspinatus tendon, inflammation at the biceps. Pertinent findings include:  LEFT KNEE: 0-140, mild effusion, LJLT, equivocal lateral ziyad. LEFT SHOULDER: 180/180/90/40/40, 4+/5 ABD/FF/FSP, 5-/5 IR/ER, tender bicipital groove, -impingement, 1+ stability in all directions.  Confounding medical conditions/concerns: None  Plan: Patient will continue use of Playmaker knee brace and Reparel knee sleeve to ensure stability while returning to activities of daily living. Patient will continue physical therapy, HEP, and stretching for both the knee and shoulder - focusing on work hardening for the shoulder. THIS SERVES AS A LETTER OF MEDICALLY NECESSITY THAT THE PATIENT START PHYSICAL THERAPY FOR THE KNEE IN ORDER TO PREVENT SURGICAL INTERVENTION OR REINJURY, AND FOR THE PATIENT TO RETURN TO THE DUTIES REQUIRED BY HIS PLACE OF EMPLOYMENT. Recommended the patient focus on his shoulder endurance. Continue use of previously prescribed Naprosyn 500mg for pain management and inflammation - use as directed. In addition, recommended utilizing OTC topical Voltaren Gel on affected areas. Modify activity as discussed. Tests Ordered: None  Prescription Medications Ordered: C/t Naprosyn 500mg Braces/DME Ordered: None  Activity/Work/Sports Status: OOW  Additional Instructions: Continue use of Playmaker and Reparel knee sleeve Follow-Up: 6 weeks   The patient's current medication management of their orthopedic diagnosis was reviewed today: (1) The patient will continue with the PRN use of their prescribed anti-inflammatory. (2) There is a moderate risk of morbidity with further treatment, especially from use of prescription strength medications and possible side effects of these medications which include upset stomach with oral medications, skin reactions to topical medications and cardiac/renal issues with long term use. (3) I recommended that the patient follow-up with their medical physician to discuss any significant specific potential issues with long term medication use such as interactions with current medications or with exacerbation of underlying medical comorbidities. (4) The benefits and risks associated with use of injectable, oral or topical, prescription and over the counter anti-inflammatory medications were discussed with the patient. The patient voiced understanding of the risks including but not limited to bleeding, stroke, kidney dysfunction, heart disease, and were referred to the black box warning label for further information.  I, Fatuma Birmingham, attest that this documentation has been prepared under the direction and in the presence of Provider Dr. Dusty Del Toro.   The documentation recorded by the scribe accurately reflects the service I Que Ponce PA-C personally performed and the decisions made by me. The patient was seen by me under the direct supervision of Dr. Dusty Del Toro

## 2024-04-22 ENCOUNTER — APPOINTMENT (OUTPATIENT)
Dept: ORTHOPEDIC SURGERY | Facility: CLINIC | Age: 39
End: 2024-04-22
Payer: OTHER MISCELLANEOUS

## 2024-04-22 ENCOUNTER — NON-APPOINTMENT (OUTPATIENT)
Age: 39
End: 2024-04-22

## 2024-04-22 VITALS — HEIGHT: 70 IN | WEIGHT: 240 LBS | BODY MASS INDEX: 34.36 KG/M2

## 2024-04-22 DIAGNOSIS — M25.562 PAIN IN LEFT KNEE: ICD-10-CM

## 2024-04-22 DIAGNOSIS — M25.512 PAIN IN LEFT SHOULDER: ICD-10-CM

## 2024-04-22 PROCEDURE — 99213 OFFICE O/P EST LOW 20 MIN: CPT

## 2024-04-23 NOTE — HISTORY OF PRESENT ILLNESS
[de-identified] : The patient is a 38 year old right hand dominant male  who presents today complaining of left shoulder and left lower leg pain.  Date of Injury/Onset: 1/5/24 Pain:    At Rest: K: 2/10 S: 10/10 With Activity: K: 5/10 S: 9/10 Mechanism of injury: while standing on a ladder the gate closed and knocked over the latter causing him to grab onto the pole and when he fell he landed on his left side This is a Work Related Injury being treated under Worker's Compensation. This is NOT an athletic injury occurring associated with an interscholastic or organized sports team. Quality of symptoms: Knee: aching, throbbing Shoulder: sharp Improves with: rest, NSAIDs Worse with: Knee: walking/standing, Shoulder: any shoulder movement, leaning on left side, lifting items  Treatment/Imaging/Studies Since Last Visit: PT 2x/week for the shoulder, knee has not been approved 	Reports Available For Review Today: none Out of work/sport: currently out of work School/Sport/Position/Occupation:   Changes since last visit: Reports increased posterior shoulder pain. Anterior shoulder pain has improved. Is in PT but only for the shoulder, the knee has not been approved by  yet. Knee is feeling slightly better.  Additional Information: None

## 2024-04-23 NOTE — IMAGING
[de-identified] : The patient is a well appearing 38 year  old male of their stated age.  Neck is supple & nontender to palpation. Negative Spurling's test.  Ambulates with a normal gait.   Effected Knee: LEFT  ROM:  0-145 degrees  Lachman: Negative  Pivot Shift: Negative  Anterior Drawer: Negative  Posterior Drawer / Sag:Negative  Varus Stress 0 degrees: Stable  Varus Stress 30 degrees: Stable  Valgus Stress 0 degrees: Stable  Valgus Stress 30 degrees: Stable  Medial Ziyad: Negative   Lateral Ziyad: Negative  Patella Glide: 2+  Patella Apprehension: Negative  Patella Grind: Negative  Pes [place] Valgus: Negative  Pes Cavus: Negative   Palpation:  Medial Joint Line: Nontender  Lateral Joint Line: Nontender  Medial Collateral Ligament: Nontender  Lateral Collateral Ligament/PLC: Nontender  Distal Femur: Nontender  Proximal Tibia: Nontender  Tibial Tubercle: Nontender  Gerdy's Tubercle: Nontender  Distal Pole Patella: Nontender  Quadriceps Tendon: Nontender &  Intact  Patella Tendon: Nontender &  Intact  Medial Femoral Condyle: Nontender  Medial Distal Hamstring/PES: Nontender  Lateral Distal Hamstring: Nontender & Stable  Iliotibial Band: Nontender  Medial Patellofemoral Ligament: Nontender  Adductor: Nontender  Proximal GSC-Plantaris: Nontender  Calf: Supple & Nontender   Inspection:  Deformity: No  Erythema: No  Ecchymosis: No  Abrasions: No  Effusion: No  Prepatella Bursitis: No  Neurologic Exam:  Sensation L4-S1: Grossly Intact  Motor Exam:  Quadriceps: 5 out of 5  Hamstrings: 5 out of 5  EHL: 5 out of 5  FHL: 5 out of 5  TA: 5 out of 5  GS: 5 out of 5  Circulatory/Pulses:  Dorsalis Pedis: 2+  Posterior Tibialis: 2+  Additional Pertinent Findings: None   Contralateral Knee:                           	  ROM: 0-145 degrees  Other Pertinent Findings: None  >>>>>>>>>>>>>>>>>>>>>>>>>>>>>>>>>>>>>>>>>>>>>>>>>>>>>>>> Effected Shoulder: LEFT  Inspection:  Scapula Winging: Negative  Deformity: None  Erythema: None  Ecchymosis: None  Abrasions: None  Effusion: None   Range of Motion:  Active Forward Flexion: 180 degrees   Active Abduction: 180 degrees   Passive Forward Flexion: 180 degrees   Passive Abduction: 180 degrees   ER @ 90 degrees: 90 degrees  IR @ 90 degrees: 40 degrees  ER @ 0 degrees: 40 degrees  Motor Exam:  Forward Flexion: 4+ out of 5  Flexion Plane of Scapula: 4+ out of 5  Abduction: 4+ out of 5  Internal Rotation: 5- out of 5  External Rotation: 5- out of 5  Distal Motor Strength: 5 out of 5   Stability Testing:  Anterior: 1+  Posterior: 1+  Sulcus N: 1+  Sulcus ER: 1+  Provocative Tests:  Drop Arm: Negative  Impingement: Negative  [place]: Negative  X-Arm Adduction: Negative  Belly Press: Negative  Bear Hug: Negative  Lift Off: Negative  Apprehension: Negative  Relocation: Negative  Posterior Load & Shift: Negative   Palpation:  AC Joint: Nontender  Clavicle: Nontender  SC Joint: Nontender  Bicepital Groove: TENDER  Coracoid Process: Nontender  Pectoralis Minor Tendon: Nontender  Pectoralis Major Tendon: Nontender & palpably intact  Latissimus Dorsi: Nontender   Proximal Humerus: Nontender  Scapula Body: Nontender  Medial Scapula Boarder: Nontender  Scapula Spine: Nontender   Neurologic Exam: Sensation to Light Touch:  Axillary: Grossly intact  Ulnar: Grossly intact  Radial: Grossly intact  Median: Grossly intact  Other:  N/A  Circulatory/Pulses:  Ulnar: 2+  Radial: 2+  Other Pertinent Findings: None   Contralateral Shoulder  Range of Motion:  Active Forward Flexion: 180 degrees   Active Abduction: 180 degrees   Passive Forward Flexion: 180 degrees  Passive Abduction: 180 degrees   ER @ 90 degrees: 90 degrees  IR @ 90 degrees: 45 degrees  ER @ 0 degrees: 50 degrees  Motor Exam:  Forward Flexion: 5 out of 5  Flexion Plane of Scapula: 5 out of 5  Abduction: 5 out of 5  Internal Rotation: 5 out of 5  External Rotation: 5 out of 5  Distal Motor Strength: 4+ out of 5  Triceps: 4 out of 5 Stability Testing:  Anterior: 1+  Posterior: 1+  Sulcus N: 1+  Sulcus ER: 1+  Other Pertinent Findings: None   Assessment: The patient is a 38 year old male with left shoulder and left knee pain and radiographic and physical exam findings consistent with resolved left knee bone contusion and left shoulder bursitis, possible cervical HNP.   The patients condition is acute  Documents/Results Reviewed Today: None  Tests/Studies Independently Interpreted Today: None Pertinent findings include:  LEFT KNEE: 0-140, mild effusion, LJLT, equivocal lateral ziyad. LEFT SHOULDER: 180/180/90/40/40, 4+/5 ABD/FF/FSP, 5-/5 IR/ER, 4/5 triceps strength, decreased  strength, tender bicipital groove, -impingement, 1+ stability in all directions.  Confounding medical conditions/concerns: None  Plan: Advised the patient that considering his diffuse weakness throughout LUE and radicular symptoms, some of his pain may be referred from cervical spine. Recommended he obtain an MRI cervical spine to rule out any HNP. His left knee contusion is resolving. He may continue to utilize Repael knee sleeve. Continue physical therapy, HEP, and stretching. Continue use of previously prescribed Naprosyn 500mg for pain management and inflammation - use as directed. In addition, recommended utilizing OTC topical Voltaren Gel on affected areas. Modify activity as discussed. Tests Ordered: MRI cervical spine  Prescription Medications Ordered: Continue Naprosyn 500mg Braces/DME Ordered: None  Activity/Work/Sports Status: OOW  Additional Instructions: None  Follow-Up: After MRI   The patient's current medication management of their orthopedic diagnosis was reviewed today: (1) We discussed a comprehensive treatment plan that included possible pharmaceutical management involving the use of prescription strength medications including but not limited to options such as oral Naprosyn 500mg BID, once daily Meloxicam 15 mg, or 500-650 mg Tylenol versus over the counter oral medications and topical prescription NSAID Pennsaid vs over the counter Voltaren gel.  Based on our extensive discussion, the patient declined prescription medication and will use over the counter Advil, Alleve, Voltaren Gel or Tylenol as directed. (2) There is a moderate risk of morbidity with further treatment, especially from use of prescription strength medications and possible side effects of these medications which include upset stomach with oral medications, skin reactions to topical medications and cardiac/renal issues with long term use. (3) I recommended that the patient follow-up with their medical physician to discuss any significant specific potential issues with long term medication use such as interactions with current medications or with exacerbation of underlying medical comorbidities. (4) The benefits and risks associated with use of injectable, oral or topical, prescription and over the counter anti-inflammatory medications were discussed with the patient. The patient voiced understanding of the risks including but not limited to bleeding, stroke, kidney dysfunction, heart disease, and were referred to the black box warning label for further information.   IGinger attest that this documentation has been prepared under the direction and in the presence of Provider Dr. Dusty Del Toro.   The documentation recorded by the scribe accurately reflects the service Mary Beth GARCIA PA-C, personally performed and the decisions made by me. The patient was seen by me under the direct supervision of Dr. Dusty Del Toro.

## 2024-04-25 ENCOUNTER — APPOINTMENT (OUTPATIENT)
Dept: MRI IMAGING | Facility: CLINIC | Age: 39
End: 2024-04-25
Payer: OTHER MISCELLANEOUS

## 2024-04-25 PROCEDURE — 72141 MRI NECK SPINE W/O DYE: CPT

## 2024-05-06 ENCOUNTER — APPOINTMENT (OUTPATIENT)
Dept: ORTHOPEDIC SURGERY | Facility: CLINIC | Age: 39
End: 2024-05-06
Payer: OTHER MISCELLANEOUS

## 2024-05-06 VITALS — WEIGHT: 240 LBS | BODY MASS INDEX: 34.36 KG/M2 | HEIGHT: 70 IN

## 2024-05-06 PROCEDURE — 99214 OFFICE O/P EST MOD 30 MIN: CPT

## 2024-05-07 NOTE — HISTORY OF PRESENT ILLNESS
[de-identified] : The patient is a 38 year old right hand dominant male  who presents today complaining of left shoulder and left lower leg pain.  Date of Injury/Onset: 1/5/24 Pain:    At Rest: K: 4/10 S: 5/10 With Activity: K: 6/10 S: 7/10 Mechanism of injury: while standing on a ladder the gate closed and knocked over the latter causing him to grab onto the pole and when he fell he landed on his left side This is a Work Related Injury being treated under Worker's Compensation. This is NOT an athletic injury occurring associated with an interscholastic or organized sports team. Quality of symptoms: Knee: aching, throbbing Shoulder: sharp Improves with: rest, NSAIDs Worse with: Knee: walking/standing, Shoulder: any shoulder movement, leaning on left side, lifting items  Treatment/Imaging/Studies Since Last Visit: PT 2x/week for the shoulder, knee has not been approved. MRI neck OC 4/25/24 	Reports Available For Review Today: MRI Out of work/sport: currently out of work School/Sport/Position/Occupation:   Changes since last visit: Knee feels the same. Shoulder/neck feeling slightly better. In PT for shoulder/neck. Here to review neck MRI results.  Additional Information: None

## 2024-05-07 NOTE — DATA REVIEWED
[MRI] : MRI [Cervical Spine] : cervical spine [Report was reviewed and noted in the chart] : The report was reviewed and noted in the chart [I independently reviewed and interpreted images and report] : I independently reviewed and interpreted images and report [I reviewed the films/CD and additionally noted] : I reviewed the films/CD and additionally noted [[email or web address]] : mild left narrowing at C3-C4, mild foraminal narrowing at C5-C6, 2 -cm cisterna magna in the midline of the posterior fossa

## 2024-05-07 NOTE — IMAGING
[de-identified] : The patient is a well appearing 38 year  old male of their stated age.  Neck is supple & nontender to palpation. Negative Spurling's test.  Ambulates with a normal gait.   Effected Knee: LEFT  ROM:  0-145 degrees  Lachman: Negative  Pivot Shift: Negative  Anterior Drawer: Negative  Posterior Drawer / Sag:Negative  Varus Stress 0 degrees: Stable  Varus Stress 30 degrees: Stable  Valgus Stress 0 degrees: Stable  Valgus Stress 30 degrees: Stable  Medial Ziyad: Negative   Lateral Ziyad: Negative  Patella Glide: 2+  Patella Apprehension: Negative  Patella Grind: Negative  Pes Clio Valgus: Negative  Pes Cavus: Negative   Palpation:  Medial Joint Line: Nontender  Lateral Joint Line: Nontender  Medial Collateral Ligament: Nontender  Lateral Collateral Ligament/PLC: Nontender  Distal Femur: Nontender  Proximal Tibia: Nontender  Tibial Tubercle: Nontender  Gerdy's Tubercle: Nontender  Distal Pole Patella: Nontender  Quadriceps Tendon: Nontender &  Intact  Patella Tendon: Nontender &  Intact  Medial Femoral Condyle: Nontender  Medial Distal Hamstring/PES: Nontender  Lateral Distal Hamstring: Nontender & Stable  Iliotibial Band: Nontender  Medial Patellofemoral Ligament: Nontender  Adductor: Nontender  Proximal GSC-Plantaris: Nontender  Calf: Supple & Nontender   Inspection:  Deformity: No  Erythema: No  Ecchymosis: No  Abrasions: No  Effusion: No  Prepatella Bursitis: No  Neurologic Exam:  Sensation L4-S1: Grossly Intact  Motor Exam:  Quadriceps: 5 out of 5  Hamstrings: 5 out of 5  EHL: 5 out of 5  FHL: 5 out of 5  TA: 5 out of 5  GS: 5 out of 5  Circulatory/Pulses:  Dorsalis Pedis: 2+  Posterior Tibialis: 2+  Additional Pertinent Findings: None   Contralateral Knee:                           	  ROM: 0-145 degrees  Other Pertinent Findings: None  >>>>>>>>>>>>>>>>>>>>>>>>>>>>>>>>>>>>>>>>>>>>>>>>>>>>>>>> Effected Shoulder: LEFT  Inspection:  Scapula Winging: Negative  Deformity: None  Erythema: None  Ecchymosis: None  Abrasions: None  Effusion: None   Range of Motion:  Active Forward Flexion: 180 degrees   Active Abduction: 180 degrees   Passive Forward Flexion: 180 degrees   Passive Abduction: 180 degrees   ER @ 90 degrees: 90 degrees  IR @ 90 degrees: 40 degrees  ER @ 0 degrees: 40 degrees  Motor Exam:  Forward Flexion: 4+ out of 5  Flexion Plane of Scapula: 4+ out of 5  Abduction: 4+ out of 5  Internal Rotation: 5- out of 5  External Rotation: 5- out of 5  Distal Motor Strength: 5 out of 5   Stability Testing:  Anterior: 1+  Posterior: 1+  Sulcus N: 1+  Sulcus ER: 1+  Provocative Tests:  Drop Arm: Negative  Impingement: Negative  [place]: Negative  X-Arm Adduction: Negative  Belly Press: Negative  Bear Hug: Negative  Lift Off: Negative  Apprehension: Negative  Relocation: Negative  Posterior Load & Shift: Negative   Palpation:  AC Joint: Nontender  Clavicle: Nontender  SC Joint: Nontender  Bicepital Groove: TENDER  Coracoid Process: Nontender  Pectoralis Minor Tendon: Nontender  Pectoralis Major Tendon: Nontender & palpably intact  Latissimus Dorsi: Nontender   Proximal Humerus: Nontender  Scapula Body: Nontender  Medial Scapula Boarder: Nontender  Scapula Spine: Nontender   Neurologic Exam: Sensation to Light Touch:  Axillary: Grossly intact  Ulnar: Grossly intact  Radial: Grossly intact  Median: Grossly intact  Other:  N/A  Circulatory/Pulses:  Ulnar: 2+  Radial: 2+  Other Pertinent Findings: None   Contralateral Shoulder  Range of Motion:  Active Forward Flexion: 180 degrees   Active Abduction: 180 degrees   Passive Forward Flexion: 180 degrees  Passive Abduction: 180 degrees   ER @ 90 degrees: 90 degrees  IR @ 90 degrees: 45 degrees  ER @ 0 degrees: 50 degrees  Motor Exam:  Forward Flexion: 5 out of 5  Flexion Plane of Scapula: 5 out of 5  Abduction: 5 out of 5  Internal Rotation: 5 out of 5  External Rotation: 5 out of 5  Distal Motor Strength: 4+ out of 5  Triceps: 4 out of 5 Stability Testing:  Anterior: 1+  Posterior: 1+  Sulcus N: 1+  Sulcus ER: 1+  Other Pertinent Findings: None   Assessment: The patient is a 38 year old male with left shoulder and left knee pain and radiographic and physical exam findings consistent with resolved left knee bone contusion and left shoulder bursitis, possible cervical HNP.   The patients condition is acute  Documents/Results Reviewed Today: MRI cervical spine Tests/Studies Independently Interpreted Today: MRI cervical spine reveals evidence of mild left narrowing at C3-C4, mild foraminal narrowing at C5-C6, 2 -cm cisterna magna in the midline of the posterior fossa Pertinent findings include:  LEFT KNEE: 0-140, mild effusion, LJLT, equivocal lateral ziyad. LEFT SHOULDER: 180/180/90/40/40, 4+/5 ABD/FF/FSP, 5-/5 IR/ER, 4/5 triceps strength, decreased  strength, tender bicipital groove, -impingement, 1+ stability in all directions.  Confounding medical conditions/concerns: None  Plan: Due to MRI results recommended seeing a Neurosurgeon for further evaluation of cisterna.The patient will also follow up with pain management.  His left knee contusion is resolving. He may continue to utilize Repael knee sleeve. Continue physical therapy, HEP, and stretching. Continue use of previously prescribed Naprosyn 500mg for pain management and inflammation - use as directed. In addition, recommended utilizing OTC topical Voltaren Gel on affected areas. Modify activity as discussed. Tests Ordered: None  Prescription Medications Ordered: Continue Naprosyn 500mg Braces/DME Ordered: None  Activity/Work/Sports Status: OOW  Additional Instructions: None  Follow-Up: referral for Dr. Ramirez and referral for Neurosurgeon for cisterna.  The patient's current medication management of their orthopedic diagnosis was reviewed today: (1) We discussed a comprehensive treatment plan that included possible pharmaceutical management involving the use of prescription strength medications including but not limited to options such as oral Naprosyn 500mg BID, once daily Meloxicam 15 mg, or 500-650 mg Tylenol versus over the counter oral medications and topical prescription NSAID Pennsaid vs over the counter Voltaren gel.  Based on our extensive discussion, the patient declined prescription medication and will use over the counter Advil, Alleve, Voltaren Gel or Tylenol as directed. (2) There is a moderate risk of morbidity with further treatment, especially from use of prescription strength medications and possible side effects of these medications which include upset stomach with oral medications, skin reactions to topical medications and cardiac/renal issues with long term use. (3) I recommended that the patient follow-up with their medical physician to discuss any significant specific potential issues with long term medication use such as interactions with current medications or with exacerbation of underlying medical comorbidities. (4) The benefits and risks associated with use of injectable, oral or topical, prescription and over the counter anti-inflammatory medications were discussed with the patient. The patient voiced understanding of the risks including but not limited to bleeding, stroke, kidney dysfunction, heart disease, and were referred to the black box warning label for further information.   IIsha attest that this documentation has been prepared under the direction and in the presence of Provider Dr. Dusty Del Toro.  The documentation recorded by the scribe accurately reflects the services IDr. Dusty, personally performed and the decisions made by me.

## 2024-05-13 ENCOUNTER — APPOINTMENT (OUTPATIENT)
Dept: NEUROSURGERY | Facility: CLINIC | Age: 39
End: 2024-05-13
Payer: COMMERCIAL

## 2024-05-13 VITALS
OXYGEN SATURATION: 97 % | HEART RATE: 93 BPM | SYSTOLIC BLOOD PRESSURE: 145 MMHG | BODY MASS INDEX: 34.36 KG/M2 | DIASTOLIC BLOOD PRESSURE: 97 MMHG | HEIGHT: 70 IN | WEIGHT: 240 LBS

## 2024-05-13 DIAGNOSIS — G93.0 CEREBRAL CYSTS: ICD-10-CM

## 2024-05-13 PROCEDURE — 99202 OFFICE O/P NEW SF 15 MIN: CPT

## 2024-05-13 NOTE — ASSESSMENT
[FreeTextEntry1] : Mr. Trell Narayanan is a very pleasant 38 year old male with a history of HTN who presents for incidental posterior fossa arachnoid cyst found on MRI of his cervical spine on workup for left arm numbness/pain. He denies any neurological symptoms. I reviewed his imaging with him and explained that this is likely an incidental finding and that the natural history of arachnoid cysts are that they do not cause any issues. If however the arachnoid cyst were to get larger and cause compression of the cerebellum, then he could develop symptoms of headaches, balance issues, coordination issues, and gait difficulty. He knows to let me know if he were to develop any of these symptoms. He will follow-up with me on an as needed basis. All questions answered. He knows to call my office with any issues or concerns.

## 2024-05-13 NOTE — HISTORY OF PRESENT ILLNESS
[FreeTextEntry1] : incidental posterior fossa arachnoid cyst [de-identified] : Mr. Trell Narayanan is a very pleasant 38 year old male with a history of HTN who presents for incidental posterior fossa arachnoid cyst found on MRI of his cervical spine on workup for left arm numbness/pain. He denies any headaches, dizziness, balance issues, coordination issues, gait issues.

## 2024-05-13 NOTE — PHYSICAL EXAM

## 2024-05-20 ENCOUNTER — APPOINTMENT (OUTPATIENT)
Dept: ORTHOPEDIC SURGERY | Facility: CLINIC | Age: 39
End: 2024-05-20
Payer: OTHER MISCELLANEOUS

## 2024-05-20 VITALS — WEIGHT: 240 LBS | BODY MASS INDEX: 34.36 KG/M2 | HEIGHT: 70 IN

## 2024-05-20 DIAGNOSIS — M54.12 RADICULOPATHY, CERVICAL REGION: ICD-10-CM

## 2024-05-20 DIAGNOSIS — T14.8XXA OTHER INJURY OF UNSPECIFIED BODY REGION, INITIAL ENCOUNTER: ICD-10-CM

## 2024-05-20 DIAGNOSIS — Q04.8 OTHER SPECIFIED CONGENITAL MALFORMATIONS OF BRAIN: ICD-10-CM

## 2024-05-20 DIAGNOSIS — M75.52 BURSITIS OF LEFT SHOULDER: ICD-10-CM

## 2024-05-20 PROCEDURE — 99214 OFFICE O/P EST MOD 30 MIN: CPT

## 2024-05-20 NOTE — IMAGING
[de-identified] : The patient is a well appearing 38 year  old male of their stated age.  Neck is supple & nontender to palpation. Negative Spurling's test.  Ambulates with a normal gait.   Effected Knee: LEFT  ROM:  0-145 degrees  Lachman: Negative  Pivot Shift: Negative  Anterior Drawer: Negative  Posterior Drawer / Sag:Negative  Varus Stress 0 degrees: Stable  Varus Stress 30 degrees: Stable  Valgus Stress 0 degrees: Stable  Valgus Stress 30 degrees: Stable  Medial Ziyad: Negative   Lateral Ziyad: Negative  Patella Glide: 2+  Patella Apprehension: Negative  Patella Grind: Negative  Pes [place] Valgus: Negative  Pes Cavus: Negative   Palpation:  Medial Joint Line: Nontender  Lateral Joint Line: Nontender  Medial Collateral Ligament: Nontender  Lateral Collateral Ligament/PLC: Nontender  Distal Femur: Nontender  Proximal Tibia: Nontender  Tibial Tubercle: Nontender  Gerdy's Tubercle: Nontender  Distal Pole Patella: Nontender  Quadriceps Tendon: Nontender &  Intact  Patella Tendon: Nontender &  Intact  Medial Femoral Condyle: Nontender  Medial Distal Hamstring/PES: Nontender  Lateral Distal Hamstring: Nontender & Stable  Iliotibial Band: Nontender  Medial Patellofemoral Ligament: Nontender  Adductor: Nontender  Proximal GSC-Plantaris: Nontender  Calf: Supple & Nontender   Inspection:  Deformity: No  Erythema: No  Ecchymosis: No  Abrasions: No  Effusion: No  Prepatella Bursitis: No  Neurologic Exam:  Sensation L4-S1: Grossly Intact  Motor Exam:  Quadriceps: 5 out of 5  Hamstrings: 5 out of 5  EHL: 5 out of 5  FHL: 5 out of 5  TA: 5 out of 5  GS: 5 out of 5  Circulatory/Pulses:  Dorsalis Pedis: 2+  Posterior Tibialis: 2+  Additional Pertinent Findings: None   Contralateral Knee:                           	  ROM: 0-145 degrees  Other Pertinent Findings: None  >>>>>>>>>>>>>>>>>>>>>>>>>>>>>>>>>>>>>>>>>>>>>>>>>>>>>>>> Effected Shoulder: LEFT  Inspection:  Scapula Winging: Negative  Deformity: None  Erythema: None  Ecchymosis: None  Abrasions: None  Effusion: None   Range of Motion:  Active Forward Flexion: 180 degrees   Active Abduction: 180 degrees   Passive Forward Flexion: 180 degrees   Passive Abduction: 180 degrees   ER @ 90 degrees: 90 degrees  IR @ 90 degrees: 45 degrees  ER @ 0 degrees: 50 degrees  Motor Exam:  Forward Flexion: 5 out of 5  Flexion Plane of Scapula: 5 out of 5  Abduction: 5 out of 5  Internal Rotation: 5 out of 5  External Rotation: 5 out of 5  Distal Motor Strength: 5 out of 5   Stability Testing:  Anterior: 1+  Posterior: 1+  Sulcus N: 1+  Sulcus ER: 1+  Provocative Tests:  Drop Arm: Negative  Impingement: slight +  [place]: Negative  X-Arm Adduction: Negative  Belly Press: Negative  Bear Hug: Negative  Lift Off: Negative  Apprehension: Negative  Relocation: Negative  Posterior Load & Shift: Negative   Palpation:  AC Joint: Nontender  Clavicle: Nontender  SC Joint: Nontender  Bicepital Groove: Nontender Coracoid Process: Nontender  Pectoralis Minor Tendon: Nontender  Pectoralis Major Tendon: Nontender & palpably intact  Latissimus Dorsi: Nontender   Proximal Humerus: Nontender  Scapula Body: Nontender  Medial Scapula Boarder: Nontender  Scapula Spine: Nontender   Neurologic Exam: Sensation to Light Touch:  Axillary: Grossly intact  Ulnar: Grossly intact  Radial: Grossly intact  Median: Grossly intact  Other:  N/A  Circulatory/Pulses:  Ulnar: 2+  Radial: 2+  Other Pertinent Findings: None   Contralateral Shoulder  Range of Motion:  Active Forward Flexion: 180 degrees   Active Abduction: 180 degrees   Passive Forward Flexion: 180 degrees  Passive Abduction: 180 degrees   ER @ 90 degrees: 90 degrees  IR @ 90 degrees: 45 degrees  ER @ 0 degrees: 50 degrees  Motor Exam:  Forward Flexion: 5 out of 5  Flexion Plane of Scapula: 5 out of 5  Abduction: 5 out of 5  Internal Rotation: 5 out of 5  External Rotation: 5 out of 5  Distal Motor Strength: 4+ out of 5  Triceps: 4 out of 5 Stability Testing:  Anterior: 1+  Posterior: 1+  Sulcus N: 1+  Sulcus ER: 1+  Other Pertinent Findings: None   Assessment: The patient is a 38 year old male with left shoulder and left knee pain and radiographic and physical exam findings consistent with resolved left knee bone contusion and left shoulder bursitis, possible cervical HNP.   The patients condition is acute  Documents/Results Reviewed Today: None Tests/Studies Independently Interpreted Today: None Pertinent findings include:  LEFT KNEE: 0-140,no pain with forced extension LEFT SHOULDER: 180/180/90/45/50, 5/5 throughout, slightly + impingement,  Confounding medical conditions/concerns: None  Plan: Patient reports his numbness and tingling has started to resolve. The patient saw an neurosurgeon and reports everything was normal. The patient is seeing Dr. Ott for pain management. He has full ROM, a slightly positive impingement, - [place] and 5/5 strength. As for his knee, he has no pain with forced extension. He may continue to utilize Repael knee sleeve. Continue physical therapy, HEP, and stretching. Continue use of previously prescribed Naprosyn 500mg for pain management and inflammation as needed - use as directed. In addition, recommended utilizing OTC topical Voltaren Gel on affected areas. Modify activity as discussed. The patient cleared to be returning to work on 5/28/24 full duty.  Tests Ordered: None  Prescription Medications Ordered: Continue Naprosyn 500mg as needed  Braces/DME Ordered: None  Activity/Work/Sports Status: Will return to work 5/28/24 Additional Instructions: None  Follow-Up: 3 months   The patient's current medication management of their orthopedic diagnosis was reviewed today: (1) We discussed a comprehensive treatment plan that included possible pharmaceutical management involving the use of prescription strength medications including but not limited to options such as oral Naprosyn 500mg BID, once daily Meloxicam 15 mg, or 500-650 mg Tylenol versus over the counter oral medications and topical prescription NSAID Pennsaid vs over the counter Voltaren gel.  Based on our extensive discussion, the patient declined prescription medication and will use over the counter Advil, Alleve, Voltaren Gel or Tylenol as directed. (2) There is a moderate risk of morbidity with further treatment, especially from use of prescription strength medications and possible side effects of these medications which include upset stomach with oral medications, skin reactions to topical medications and cardiac/renal issues with long term use. (3) I recommended that the patient follow-up with their medical physician to discuss any significant specific potential issues with long term medication use such as interactions with current medications or with exacerbation of underlying medical comorbidities. (4) The benefits and risks associated with use of injectable, oral or topical, prescription and over the counter anti-inflammatory medications were discussed with the patient. The patient voiced understanding of the risks including but not limited to bleeding, stroke, kidney dysfunction, heart disease, and were referred to the black box warning label for further information.   IIsha attest that this documentation has been prepared under the direction and in the presence of Provider Dr. Dusty Del Toro.  The documentation recorded by the scribe accurately reflects the services IDr. Dusty, personally performed and the decisions made by me.

## 2024-05-20 NOTE — HISTORY OF PRESENT ILLNESS
[de-identified] : The patient is a 38 year old right hand dominant male  who presents today complaining of left shoulder. Date of Injury/Onset: 1/5/24 Pain:    At Rest: S: 2/10 With Activity:  S: 4/10 Mechanism of injury: while standing on a ladder the gate closed and knocked over the latter causing him to grab onto the pole and when he fell he landed on his left side This is a Work Related Injury being treated under Worker's Compensation. This is NOT an athletic injury occurring associated with an interscholastic or organized sports team. Quality of symptoms:  Shoulder: sharp Improves with: rest, NSAIDs Worse with: Knee: OH movements Treatment/Imaging/Studies Since Last Visit: PT 2x/week for the shoulder 	Reports Available For Review Today: none Out of work/sport: currently out of work School/Sport/Position/Occupation:   Changes since last visit: Patient reports improvement in pain since he has been doing PT on his shoulder. Saw Dr. Schuler for a neurosurgical consult. His knee and neck have not been estabilished with his w/c case. Has an appointment with Dr. Ott in June. Additional Information: None

## 2024-06-10 ENCOUNTER — APPOINTMENT (OUTPATIENT)
Dept: PAIN MANAGEMENT | Facility: CLINIC | Age: 39
End: 2024-06-10

## 2024-06-18 ENCOUNTER — APPOINTMENT (OUTPATIENT)
Dept: GASTROENTEROLOGY | Facility: CLINIC | Age: 39
End: 2024-06-18

## 2024-07-21 PROBLEM — S93.402A MODERATE LEFT ANKLE SPRAIN, INITIAL ENCOUNTER: Status: ACTIVE | Noted: 2024-07-21

## 2024-08-12 ENCOUNTER — APPOINTMENT (OUTPATIENT)
Dept: ORTHOPEDIC SURGERY | Facility: CLINIC | Age: 39
End: 2024-08-12
Payer: OTHER MISCELLANEOUS

## 2024-08-12 VITALS — WEIGHT: 250 LBS | BODY MASS INDEX: 35.79 KG/M2 | HEIGHT: 70 IN

## 2024-08-12 DIAGNOSIS — M75.52 BURSITIS OF LEFT SHOULDER: ICD-10-CM

## 2024-08-12 DIAGNOSIS — M25.512 PAIN IN LEFT SHOULDER: ICD-10-CM

## 2024-08-12 PROCEDURE — 99213 OFFICE O/P EST LOW 20 MIN: CPT

## 2024-08-13 NOTE — HISTORY OF PRESENT ILLNESS
[de-identified] : The patient is a 38 year old right hand dominant male  who presents today complaining of left shoulder. Date of Injury/Onset: 1/5/24 Pain:    At Rest: S: 2/10 With Activity:  S: 2/10 Mechanism of injury: while standing on a ladder the gate closed and knocked over the latter causing him to grab onto the pole and when he fell he landed on his left side This is a Work Related Injury being treated under Worker's Compensation. This is NOT an athletic injury occurring associated with an interscholastic or organized sports team. Quality of symptoms:  Shoulder: sharp, numbness to 5th and 4th fingers  Improves with: rest, NSAIDs Worse with: Knee: OH movements Treatment/Imaging/Studies Since Last Visit: none  	Reports Available For Review Today: none Out of work/sport: currently out of work School/Sport/Position/Occupation:   Changes since last visit: Feeling better. Is not in PT. Returned to work. DId not see pain management because he states his pain decreased. C/o numbness that travels to 4th and 5th fingers.  Additional Information: None

## 2024-08-13 NOTE — IMAGING
[de-identified] : The patient is a well appearing 38 year  old male of their stated age.  Neck is supple & nontender to palpation. Negative Spurling's test.  Ambulates with a normal gait.   Effected Knee: LEFT  ROM:  0-145 degrees  Lachman: Negative  Pivot Shift: Negative  Anterior Drawer: Negative  Posterior Drawer / Sag:Negative  Varus Stress 0 degrees: Stable  Varus Stress 30 degrees: Stable  Valgus Stress 0 degrees: Stable  Valgus Stress 30 degrees: Stable  Medial Ziyad: Negative   Lateral Ziyad: Negative  Patella Glide: 2+  Patella Apprehension: Negative  Patella Grind: Negative  Pes [place] Valgus: Negative  Pes Cavus: Negative   Palpation:  Medial Joint Line: Nontender  Lateral Joint Line: Nontender  Medial Collateral Ligament: Nontender  Lateral Collateral Ligament/PLC: Nontender  Distal Femur: Nontender  Proximal Tibia: Nontender  Tibial Tubercle: Nontender  Gerdy's Tubercle: Nontender  Distal Pole Patella: Nontender  Quadriceps Tendon: Nontender &  Intact  Patella Tendon: Nontender &  Intact  Medial Femoral Condyle: Nontender  Medial Distal Hamstring/PES: Nontender  Lateral Distal Hamstring: Nontender & Stable  Iliotibial Band: Nontender  Medial Patellofemoral Ligament: Nontender  Adductor: Nontender  Proximal GSC-Plantaris: Nontender  Calf: Supple & Nontender   Inspection:  Deformity: No  Erythema: No  Ecchymosis: No  Abrasions: No  Effusion: No  Prepatella Bursitis: No  Neurologic Exam:  Sensation L4-S1: Grossly Intact  Motor Exam:  Quadriceps: 5 out of 5  Hamstrings: 5 out of 5  EHL: 5 out of 5  FHL: 5 out of 5  TA: 5 out of 5  GS: 5 out of 5  Circulatory/Pulses:  Dorsalis Pedis: 2+  Posterior Tibialis: 2+  Additional Pertinent Findings: None   Contralateral Knee:                           	  ROM: 0-145 degrees  Other Pertinent Findings: None  >>>>>>>>>>>>>>>>>>>>>>>>>>>>>>>>>>>>>>>>>>>>>>>>>>>>>>>> Effected Shoulder: LEFT  Inspection:  Scapula Winging: Negative  Deformity: None  Erythema: None  Ecchymosis: None  Abrasions: None  Effusion: None   Range of Motion:  Active Forward Flexion: 180 degrees   Active Abduction: 180 degrees   Passive Forward Flexion: 180 degrees   Passive Abduction: 180 degrees   ER @ 90 degrees: 90 degrees  IR @ 90 degrees: 45 degrees  ER @ 0 degrees: 50 degrees  Motor Exam:  Forward Flexion: 5 out of 5  Flexion Plane of Scapula: 5 out of 5  Abduction: 5 out of 5  Internal Rotation: 5 out of 5  External Rotation: 5 out of 5  Distal Motor Strength: 5 out of 5   Stability Testing:  Anterior: 1+  Posterior: 1+  Sulcus N: 1+  Sulcus ER: 1+  Provocative Tests:  Drop Arm: Negative  Impingement: slight +  [place]: Negative  X-Arm Adduction: Negative  Belly Press: Negative  Bear Hug: Negative  Lift Off: Negative  Apprehension: Negative  Relocation: Negative  Posterior Load & Shift: Negative   Palpation:  AC Joint: Nontender  Clavicle: Nontender  SC Joint: Nontender  Bicepital Groove: Nontender Coracoid Process: Nontender  Pectoralis Minor Tendon: Nontender  Pectoralis Major Tendon: Nontender & palpably intact  Latissimus Dorsi: Nontender   Proximal Humerus: Nontender  Scapula Body: Nontender  Medial Scapula Boarder: Nontender  Scapula Spine: Nontender   Neurologic Exam: Sensation to Light Touch:  Axillary: Grossly intact  Ulnar: Grossly intact  Radial: Grossly intact  Median: Grossly intact  Other:  N/A  Circulatory/Pulses:  Ulnar: 2+  Radial: 2+  Other Pertinent Findings: None   Contralateral Shoulder  Range of Motion:  Active Forward Flexion: 180 degrees   Active Abduction: 180 degrees   Passive Forward Flexion: 180 degrees  Passive Abduction: 180 degrees   ER @ 90 degrees: 90 degrees  IR @ 90 degrees: 45 degrees  ER @ 0 degrees: 50 degrees  Motor Exam:  Forward Flexion: 5 out of 5  Flexion Plane of Scapula: 5 out of 5  Abduction: 5 out of 5  Internal Rotation: 5 out of 5  External Rotation: 5 out of 5  Distal Motor Strength: 4+ out of 5  Triceps: 4 out of 5 Stability Testing:  Anterior: 1+  Posterior: 1+  Sulcus N: 1+  Sulcus ER: 1+  Other Pertinent Findings: None   Assessment: The patient is a 38 year old male with left shoulder and left knee pain and radiographic and physical exam findings consistent with resolved left knee bone contusion and left shoulder bursitis.   The patients condition is acute  Documents/Results Reviewed Today: None Tests/Studies Independently Interpreted Today: None Pertinent findings include:  LEFT KNEE: 0-140, grossly benign clinical examination. LEFT SHOULDER: 175/175/90/30/30, 5/5 throughout, mildly positive impingement.  Confounding medical conditions/concerns: None  Plan: The patient will continue physical therapy, HEP, and stretching. Continue use of previously prescribed Naprosyn 500mg for pain management and inflammation as needed - use as directed. In addition, recommended utilizing OTC topical Voltaren Gel on affected areas. If any discomfort persists in 6 weeks, we will discuss a subacromial corticosteroid injection. Currently working. Modify activity as discussed.  Tests Ordered: None  Prescription Medications Ordered: Continue Naprosyn 500mg as needed  Braces/DME Ordered: None  Activity/Work/Sports Status: Currently working  Additional Instructions: None  Follow-Up: 6 weeks   The patient's current medication management of their orthopedic diagnosis was reviewed today: (1) The patient will continue with the PRN use of their prescribed anti-inflammatory. (2) There is a moderate risk of morbidity with further treatment, especially from use of prescription strength medications and possible side effects of these medications which include upset stomach with oral medications, skin reactions to topical medications and cardiac/renal issues with long term use. (3) I recommended that the patient follow-up with their medical physician to discuss any significant specific potential issues with long term medication use such as interactions with current medications or with exacerbation of underlying medical comorbidities. (4) The benefits and risks associated with use of injectable, oral or topical, prescription and over the counter anti-inflammatory medications were discussed with the patient. The patient voiced understanding of the risks including but not limited to bleeding, stroke, kidney dysfunction, heart disease, and were referred to the black box warning label for further information.   IGinger attest that this documentation has been prepared under the direction and in the presence of Provider Dr. Dusty DelT oro.   The documentation recorded by the scribe accurately reflects the service JOSE Ponce PA-C personally performed and the decisions made by me. The patient was seen by me under the direct supervision of Dr. Dusty Del Toro

## 2024-08-15 ENCOUNTER — APPOINTMENT (OUTPATIENT)
Dept: ORTHOPEDIC SURGERY | Facility: CLINIC | Age: 39
End: 2024-08-15
Payer: COMMERCIAL

## 2024-08-15 VITALS — BODY MASS INDEX: 35.79 KG/M2 | HEIGHT: 70 IN | WEIGHT: 250 LBS

## 2024-08-15 DIAGNOSIS — S93.402A SPRAIN OF UNSPECIFIED LIGAMENT OF LEFT ANKLE, INITIAL ENCOUNTER: ICD-10-CM

## 2024-08-15 DIAGNOSIS — Z78.9 OTHER SPECIFIED HEALTH STATUS: ICD-10-CM

## 2024-08-15 PROCEDURE — 99204 OFFICE O/P NEW MOD 45 MIN: CPT

## 2024-08-15 RX ORDER — ROSUVASTATIN CALCIUM 5 MG/1
5 TABLET, FILM COATED ORAL
Qty: 90 | Refills: 0 | Status: ACTIVE | COMMUNITY
Start: 2024-06-21

## 2024-08-15 NOTE — DISCUSSION/SUMMARY
[de-identified] : Patient is advised to take the diclofenac that has been prescribed to him.  The patient was explained the options as well as benefits of over the counter verses prescription strength nonsteroidal anti-inflammatory medication. Prescription strength medication is recommended to suppress chronic inflammation. The patient opts for a prescription strength medication.  PT is recommended. Icing daily. He will use his otc brace while working.

## 2024-08-15 NOTE — PHYSICAL EXAM
[Left] : left foot and ankle [NL (40)] : plantar flexion 40 degrees [5___] : plantar flexion 5[unfilled]/5 [4___] : eversion 4[unfilled]/5 [2+] : posterior tibialis pulse: 2+ [Normal] : saphenous nerve sensation normal [] : patient ambulates without assistive device [TWNoteComboBox7] : dorsiflexion 15 degrees [de-identified] : inversion 20 degrees [de-identified] : eversion 15 degrees

## 2024-08-15 NOTE — HISTORY OF PRESENT ILLNESS
[6] : 6 [0] : 0 [Sharp] : sharp [Constant] : constant [Household chores] : household chores [Leisure] : leisure [Work] : work [Full time] : Work status: full time [de-identified] : Patient is38 year old male presents here for a consult on his left ankle. He injured his ankle on 7/20/24 while playing football.  He went to [facility]  and was diagnosed with a sprain and given a cam boot.  He presents today wb in sneakers.  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  He still reports pain/swelling.  No prior left ankle problems. [] : no [FreeTextEntry1] : Left ankle [FreeTextEntry9] : brace [de-identified] : wearing boots for work [de-identified] : 7/21/24 [de-identified] : [facility] PA [facility] N[facility] [de-identified] : xray

## 2024-08-15 NOTE — DATA REVIEWED
[Outside X-rays] : outside x-rays [Left] : left [Ankle] : ankle [I reviewed the films/CD] : I reviewed the films/CD [FreeTextEntry1] : [facility] - 7/21/24:  No fractures subluxations, dislocations or other acute pathology present.

## 2024-08-19 ENCOUNTER — RX RENEWAL (OUTPATIENT)
Age: 39
End: 2024-08-19

## 2024-09-23 ENCOUNTER — APPOINTMENT (OUTPATIENT)
Dept: ORTHOPEDIC SURGERY | Facility: CLINIC | Age: 39
End: 2024-09-23
Payer: OTHER MISCELLANEOUS

## 2024-09-23 VITALS — WEIGHT: 250 LBS | BODY MASS INDEX: 35.79 KG/M2 | HEIGHT: 70 IN

## 2024-09-23 DIAGNOSIS — M75.52 BURSITIS OF LEFT SHOULDER: ICD-10-CM

## 2024-09-23 DIAGNOSIS — M25.562 PAIN IN LEFT KNEE: ICD-10-CM

## 2024-09-23 DIAGNOSIS — M25.512 PAIN IN LEFT SHOULDER: ICD-10-CM

## 2024-09-23 DIAGNOSIS — T14.8XXA OTHER INJURY OF UNSPECIFIED BODY REGION, INITIAL ENCOUNTER: ICD-10-CM

## 2024-09-23 PROCEDURE — 99213 OFFICE O/P EST LOW 20 MIN: CPT

## 2024-09-24 NOTE — IMAGING
[de-identified] : The patient is a well appearing 38 year  old male of their stated age.  Neck is supple & nontender to palpation. Negative Spurling's test.  Ambulates with a normal gait.   Effected Knee: LEFT  ROM:  0-145 degrees  Lachman: Negative  Pivot Shift: Negative  Anterior Drawer: Negative  Posterior Drawer / Sag:Negative  Varus Stress 0 degrees: Stable  Varus Stress 30 degrees: Stable  Valgus Stress 0 degrees: Stable  Valgus Stress 30 degrees: Stable  Medial Ziyad: Negative   Lateral Ziyad: Negative  Patella Glide: 2+  Patella Apprehension: Negative  Patella Grind: Negative  Pes [place] Valgus: Negative  Pes Cavus: Negative   Palpation:  Medial Joint Line: Nontender  Lateral Joint Line: Nontender  Medial Collateral Ligament: Nontender  Lateral Collateral Ligament/PLC: Nontender  Distal Femur: Nontender  Proximal Tibia: Nontender  Tibial Tubercle: Nontender  Gerdy's Tubercle: Nontender  Distal Pole Patella: Nontender  Quadriceps Tendon: Nontender &  Intact  Patella Tendon: Nontender &  Intact  Medial Femoral Condyle: Nontender  Medial Distal Hamstring/PES: Nontender  Lateral Distal Hamstring: Nontender & Stable  Iliotibial Band: Nontender  Medial Patellofemoral Ligament: Nontender  Adductor: Nontender  Proximal GSC-Plantaris: Nontender  Calf: Supple & Nontender   Inspection:  Deformity: No  Erythema: No  Ecchymosis: No  Abrasions: No  Effusion: No  Prepatella Bursitis: No  Neurologic Exam:  Sensation L4-S1: Grossly Intact  Motor Exam:  Quadriceps: 5 out of 5  Hamstrings: 5 out of 5  EHL: 5 out of 5  FHL: 5 out of 5  TA: 5 out of 5  GS: 5 out of 5  Circulatory/Pulses:  Dorsalis Pedis: 2+  Posterior Tibialis: 2+  Additional Pertinent Findings: None   Contralateral Knee:                           	  ROM: 0-145 degrees  Other Pertinent Findings: None  >>>>>>>>>>>>>>>>>>>>>>>>>>>>>>>>>>>>>>>>>>>>>>>>>>>>>>>> Effected Shoulder: LEFT  Inspection:  Scapula Winging: Negative  Deformity: None  Erythema: None  Ecchymosis: None  Abrasions: None  Effusion: None   Range of Motion:  Active Forward Flexion: 180 degrees   Active Abduction: 180 degrees   Passive Forward Flexion: 180 degrees   Passive Abduction: 180 degrees   ER @ 90 degrees: 90 degrees  IR @ 90 degrees: 35 degrees  ER @ 0 degrees: 35 degrees  Motor Exam:  Forward Flexion: 5 out of 5  Flexion Plane of Scapula: 5 out of 5  Abduction: 5 out of 5  Internal Rotation: 5 out of 5  External Rotation: 5 out of 5  Distal Motor Strength: 5 out of 5   Stability Testing:  Anterior: 1+  Posterior: 1+  Sulcus N: 1+  Sulcus ER: 1+  Provocative Tests:  Drop Arm: Negative  Impingement: slight +  [place]: Negative  X-Arm Adduction: Negative  Belly Press: Negative  Bear Hug: Negative  Lift Off: Negative  Apprehension: Negative  Relocation: Negative  Posterior Load & Shift: Negative   Palpation:  AC Joint: Nontender  Clavicle: Nontender  SC Joint: Nontender  Bicepital Groove: Nontender Coracoid Process: Nontender  Pectoralis Minor Tendon: Nontender  Pectoralis Major Tendon: Nontender & palpably intact  Latissimus Dorsi: Nontender   Proximal Humerus: Nontender  Scapula Body: Nontender  Medial Scapula Boarder: Nontender  Scapula Spine: Nontender   Neurologic Exam: Sensation to Light Touch:  Axillary: Grossly intact  Ulnar: Grossly intact  Radial: Grossly intact  Median: Grossly intact  Other:  N/A  Circulatory/Pulses:  Ulnar: 2+  Radial: 2+  Other Pertinent Findings: None   Contralateral Shoulder  Range of Motion:  Active Forward Flexion: 180 degrees   Active Abduction: 180 degrees   Passive Forward Flexion: 180 degrees  Passive Abduction: 180 degrees   ER @ 90 degrees: 90 degrees  IR @ 90 degrees: 45 degrees  ER @ 0 degrees: 50 degrees  Motor Exam:  Forward Flexion: 5 out of 5  Flexion Plane of Scapula: 5 out of 5  Abduction: 5 out of 5  Internal Rotation: 5 out of 5  External Rotation: 5 out of 5  Distal Motor Strength: 4+ out of 5  Triceps: 4 out of 5 Stability Testing:  Anterior: 1+  Posterior: 1+  Sulcus N: 1+  Sulcus ER: 1+  Other Pertinent Findings: None   Assessment: The patient is a 38-year-old male with left shoulder and left knee pain and radiographic and physical exam findings consistent with resolved left knee bone contusion and left shoulder bursitis.   The patient's condition is acute  Documents/Results Reviewed Today: None Tests/Studies Independently Interpreted Today: None Pertinent findings include:  LEFT KNEE: 0-140, grossly benign clinical examination. LEFT SHOULDER: 180/180/90/35/35, 5/5 throughout, mildly positive impingement.  Confounding medical conditions/concerns: None  Plan: The patient will continue physical therapy, HEP, and stretching. Continue use of previously prescribed Naprosyn 500mg for pain management and inflammation as needed - use as directed. In addition, recommended utilizing OTC topical Voltaren Gel on affected areas. Recommended keeping quadriceps strong for the knee Currently working. Modify activity as discussed. Follow up in 6-8 weeks.  Tests Ordered: None  Prescription Medications Ordered: Continue Naprosyn 500mg as needed  Braces/DME Ordered: None  Activity/Work/Sports Status: Currently working  Additional Instructions: None Follow-Up: 6-8 weeks   The patient's current medication management of their orthopedic diagnosis was reviewed today: (1) The patient will continue with the PRN use of their prescribed anti-inflammatory. (2) There is a moderate risk of morbidity with further treatment, especially from use of prescription strength medications and possible side effects of these medications which include upset stomach with oral medications, skin reactions to topical medications and cardiac/renal issues with long term use. (3) I recommended that the patient follow-up with their medical physician to discuss any significant specific potential issues with long term medication use such as interactions with current medications or with exacerbation of underlying medical comorbidities. (4) The benefits and risks associated with use of injectable, oral or topical, prescription and over the counter anti-inflammatory medications were discussed with the patient. The patient voiced understanding of the risks including but not limited to bleeding, stroke, kidney dysfunction, heart disease, and were referred to the black box warning label for further information.   Isha GARCIA attest that this documentation has been prepared under the direction and in the presence of Provider Dr. Dusty Del Toro.  The documentation recorded by the scribe accurately reflects the services IDr. Dusty, personally performed and the decisions made by me.

## 2024-09-24 NOTE — IMAGING
[de-identified] : The patient is a well appearing 38 year  old male of their stated age.  Neck is supple & nontender to palpation. Negative Spurling's test.  Ambulates with a normal gait.   Effected Knee: LEFT  ROM:  0-145 degrees  Lachman: Negative  Pivot Shift: Negative  Anterior Drawer: Negative  Posterior Drawer / Sag:Negative  Varus Stress 0 degrees: Stable  Varus Stress 30 degrees: Stable  Valgus Stress 0 degrees: Stable  Valgus Stress 30 degrees: Stable  Medial Ziyad: Negative   Lateral Ziyad: Negative  Patella Glide: 2+  Patella Apprehension: Negative  Patella Grind: Negative  Pes [place] Valgus: Negative  Pes Cavus: Negative   Palpation:  Medial Joint Line: Nontender  Lateral Joint Line: Nontender  Medial Collateral Ligament: Nontender  Lateral Collateral Ligament/PLC: Nontender  Distal Femur: Nontender  Proximal Tibia: Nontender  Tibial Tubercle: Nontender  Gerdy's Tubercle: Nontender  Distal Pole Patella: Nontender  Quadriceps Tendon: Nontender &  Intact  Patella Tendon: Nontender &  Intact  Medial Femoral Condyle: Nontender  Medial Distal Hamstring/PES: Nontender  Lateral Distal Hamstring: Nontender & Stable  Iliotibial Band: Nontender  Medial Patellofemoral Ligament: Nontender  Adductor: Nontender  Proximal GSC-Plantaris: Nontender  Calf: Supple & Nontender   Inspection:  Deformity: No  Erythema: No  Ecchymosis: No  Abrasions: No  Effusion: No  Prepatella Bursitis: No  Neurologic Exam:  Sensation L4-S1: Grossly Intact  Motor Exam:  Quadriceps: 5 out of 5  Hamstrings: 5 out of 5  EHL: 5 out of 5  FHL: 5 out of 5  TA: 5 out of 5  GS: 5 out of 5  Circulatory/Pulses:  Dorsalis Pedis: 2+  Posterior Tibialis: 2+  Additional Pertinent Findings: None   Contralateral Knee:                           	  ROM: 0-145 degrees  Other Pertinent Findings: None  >>>>>>>>>>>>>>>>>>>>>>>>>>>>>>>>>>>>>>>>>>>>>>>>>>>>>>>> Effected Shoulder: LEFT  Inspection:  Scapula Winging: Negative  Deformity: None  Erythema: None  Ecchymosis: None  Abrasions: None  Effusion: None   Range of Motion:  Active Forward Flexion: 180 degrees   Active Abduction: 180 degrees   Passive Forward Flexion: 180 degrees   Passive Abduction: 180 degrees   ER @ 90 degrees: 90 degrees  IR @ 90 degrees: 35 degrees  ER @ 0 degrees: 35 degrees  Motor Exam:  Forward Flexion: 5 out of 5  Flexion Plane of Scapula: 5 out of 5  Abduction: 5 out of 5  Internal Rotation: 5 out of 5  External Rotation: 5 out of 5  Distal Motor Strength: 5 out of 5   Stability Testing:  Anterior: 1+  Posterior: 1+  Sulcus N: 1+  Sulcus ER: 1+  Provocative Tests:  Drop Arm: Negative  Impingement: slight +  [place]: Negative  X-Arm Adduction: Negative  Belly Press: Negative  Bear Hug: Negative  Lift Off: Negative  Apprehension: Negative  Relocation: Negative  Posterior Load & Shift: Negative   Palpation:  AC Joint: Nontender  Clavicle: Nontender  SC Joint: Nontender  Bicepital Groove: Nontender Coracoid Process: Nontender  Pectoralis Minor Tendon: Nontender  Pectoralis Major Tendon: Nontender & palpably intact  Latissimus Dorsi: Nontender   Proximal Humerus: Nontender  Scapula Body: Nontender  Medial Scapula Boarder: Nontender  Scapula Spine: Nontender   Neurologic Exam: Sensation to Light Touch:  Axillary: Grossly intact  Ulnar: Grossly intact  Radial: Grossly intact  Median: Grossly intact  Other:  N/A  Circulatory/Pulses:  Ulnar: 2+  Radial: 2+  Other Pertinent Findings: None   Contralateral Shoulder  Range of Motion:  Active Forward Flexion: 180 degrees   Active Abduction: 180 degrees   Passive Forward Flexion: 180 degrees  Passive Abduction: 180 degrees   ER @ 90 degrees: 90 degrees  IR @ 90 degrees: 45 degrees  ER @ 0 degrees: 50 degrees  Motor Exam:  Forward Flexion: 5 out of 5  Flexion Plane of Scapula: 5 out of 5  Abduction: 5 out of 5  Internal Rotation: 5 out of 5  External Rotation: 5 out of 5  Distal Motor Strength: 4+ out of 5  Triceps: 4 out of 5 Stability Testing:  Anterior: 1+  Posterior: 1+  Sulcus N: 1+  Sulcus ER: 1+  Other Pertinent Findings: None   Assessment: The patient is a 38-year-old male with left shoulder and left knee pain and radiographic and physical exam findings consistent with resolved left knee bone contusion and left shoulder bursitis.   The patient's condition is acute  Documents/Results Reviewed Today: None Tests/Studies Independently Interpreted Today: None Pertinent findings include:  LEFT KNEE: 0-140, grossly benign clinical examination. LEFT SHOULDER: 180/180/90/35/35, 5/5 throughout, mildly positive impingement.  Confounding medical conditions/concerns: None  Plan: The patient will continue physical therapy, HEP, and stretching. Continue use of previously prescribed Naprosyn 500mg for pain management and inflammation as needed - use as directed. In addition, recommended utilizing OTC topical Voltaren Gel on affected areas. Recommended keeping quadriceps strong for the knee Currently working. Modify activity as discussed. Follow up in 6-8 weeks.  Tests Ordered: None  Prescription Medications Ordered: Continue Naprosyn 500mg as needed  Braces/DME Ordered: None  Activity/Work/Sports Status: Currently working  Additional Instructions: None Follow-Up: 6-8 weeks   The patient's current medication management of their orthopedic diagnosis was reviewed today: (1) The patient will continue with the PRN use of their prescribed anti-inflammatory. (2) There is a moderate risk of morbidity with further treatment, especially from use of prescription strength medications and possible side effects of these medications which include upset stomach with oral medications, skin reactions to topical medications and cardiac/renal issues with long term use. (3) I recommended that the patient follow-up with their medical physician to discuss any significant specific potential issues with long term medication use such as interactions with current medications or with exacerbation of underlying medical comorbidities. (4) The benefits and risks associated with use of injectable, oral or topical, prescription and over the counter anti-inflammatory medications were discussed with the patient. The patient voiced understanding of the risks including but not limited to bleeding, stroke, kidney dysfunction, heart disease, and were referred to the black box warning label for further information.   Isha GARCIA attest that this documentation has been prepared under the direction and in the presence of Provider Dr. Dusty Del Toro.  The documentation recorded by the scribe accurately reflects the services IDr. Dusty, personally performed and the decisions made by me.

## 2024-09-24 NOTE — HISTORY OF PRESENT ILLNESS
[de-identified] : The patient is a 38 year old right hand dominant male  who presents today complaining of left shoulder. Date of Injury/Onset: 1/5/24 Pain:    At Rest: S: 2/10 With Activity:  S: 2/10 Mechanism of injury: while standing on a ladder the gate closed and knocked over the latter causing him to grab onto the pole and when he fell he landed on his left side This is a Work Related Injury being treated under Worker's Compensation. This is NOT an athletic injury occurring associated with an interscholastic or organized sports team. Quality of symptoms:  Shoulder: intermittent sharp, numbness to 5th and 4th fingers Improves with: rest, NSAIDs Worse with: Knee: OH movements Treatment/Imaging/Studies Since Last Visit: physical therapy 2x/week at [facility] 	Reports Available For Review Today: none Out of work/sport: currently working School/Sport/Position/Occupation:   Changes since last visit: Patient is going to physical therapy and is seeing some improvement in pain and strength. Symptoms have gotten less intense. Started experience left knee discomfort again going down stairs. Additional Information: None

## 2024-09-24 NOTE — HISTORY OF PRESENT ILLNESS
[de-identified] : The patient is a 38 year old right hand dominant male  who presents today complaining of left shoulder. Date of Injury/Onset: 1/5/24 Pain:    At Rest: S: 2/10 With Activity:  S: 2/10 Mechanism of injury: while standing on a ladder the gate closed and knocked over the latter causing him to grab onto the pole and when he fell he landed on his left side This is a Work Related Injury being treated under Worker's Compensation. This is NOT an athletic injury occurring associated with an interscholastic or organized sports team. Quality of symptoms:  Shoulder: intermittent sharp, numbness to 5th and 4th fingers Improves with: rest, NSAIDs Worse with: Knee: OH movements Treatment/Imaging/Studies Since Last Visit: physical therapy 2x/week at [facility] 	Reports Available For Review Today: none Out of work/sport: currently working School/Sport/Position/Occupation:   Changes since last visit: Patient is going to physical therapy and is seeing some improvement in pain and strength. Symptoms have gotten less intense. Started experience left knee discomfort again going down stairs. Additional Information: None

## 2024-10-17 ENCOUNTER — APPOINTMENT (OUTPATIENT)
Dept: ORTHOPEDIC SURGERY | Facility: CLINIC | Age: 39
End: 2024-10-17

## 2024-11-09 ENCOUNTER — INPATIENT (INPATIENT)
Facility: HOSPITAL | Age: 39
LOS: 10 days | Discharge: HOME HEALTH | DRG: 329 | End: 2024-11-20
Attending: STUDENT IN AN ORGANIZED HEALTH CARE EDUCATION/TRAINING PROGRAM | Admitting: STUDENT IN AN ORGANIZED HEALTH CARE EDUCATION/TRAINING PROGRAM
Payer: COMMERCIAL

## 2024-11-09 VITALS — WEIGHT: 261.91 LBS | HEIGHT: 70 IN

## 2024-11-09 DIAGNOSIS — K57.92 DIVERTICULITIS OF INTESTINE, PART UNSPECIFIED, WITHOUT PERFORATION OR ABSCESS WITHOUT BLEEDING: ICD-10-CM

## 2024-11-09 LAB
ALBUMIN SERPL ELPH-MCNC: 3.9 G/DL — SIGNIFICANT CHANGE UP (ref 3.3–5)
ALP SERPL-CCNC: 71 U/L — SIGNIFICANT CHANGE UP (ref 40–120)
ALT FLD-CCNC: 38 U/L — SIGNIFICANT CHANGE UP (ref 12–78)
ANION GAP SERPL CALC-SCNC: 5 MMOL/L — SIGNIFICANT CHANGE UP (ref 5–17)
APPEARANCE UR: CLEAR — SIGNIFICANT CHANGE UP
AST SERPL-CCNC: 13 U/L — LOW (ref 15–37)
BASOPHILS # BLD AUTO: 0.04 K/UL — SIGNIFICANT CHANGE UP (ref 0–0.2)
BASOPHILS NFR BLD AUTO: 0.2 % — SIGNIFICANT CHANGE UP (ref 0–2)
BILIRUB SERPL-MCNC: 0.8 MG/DL — SIGNIFICANT CHANGE UP (ref 0.2–1.2)
BILIRUB UR-MCNC: NEGATIVE — SIGNIFICANT CHANGE UP
BUN SERPL-MCNC: 21 MG/DL — SIGNIFICANT CHANGE UP (ref 7–23)
CALCIUM SERPL-MCNC: 9.7 MG/DL — SIGNIFICANT CHANGE UP (ref 8.5–10.1)
CHLORIDE SERPL-SCNC: 105 MMOL/L — SIGNIFICANT CHANGE UP (ref 96–108)
CO2 SERPL-SCNC: 26 MMOL/L — SIGNIFICANT CHANGE UP (ref 22–31)
COLOR SPEC: YELLOW — SIGNIFICANT CHANGE UP
CREAT SERPL-MCNC: 1.26 MG/DL — SIGNIFICANT CHANGE UP (ref 0.5–1.3)
DIFF PNL FLD: NEGATIVE — SIGNIFICANT CHANGE UP
EGFR: 75 ML/MIN/1.73M2 — SIGNIFICANT CHANGE UP
EOSINOPHIL # BLD AUTO: 0.04 K/UL — SIGNIFICANT CHANGE UP (ref 0–0.5)
EOSINOPHIL NFR BLD AUTO: 0.2 % — SIGNIFICANT CHANGE UP (ref 0–6)
GLUCOSE SERPL-MCNC: 123 MG/DL — HIGH (ref 70–99)
GLUCOSE UR QL: NEGATIVE MG/DL — SIGNIFICANT CHANGE UP
HCT VFR BLD CALC: 43.2 % — SIGNIFICANT CHANGE UP (ref 39–50)
HGB BLD-MCNC: 14.8 G/DL — SIGNIFICANT CHANGE UP (ref 13–17)
IMM GRANULOCYTES NFR BLD AUTO: 0.3 % — SIGNIFICANT CHANGE UP (ref 0–0.9)
KETONES UR-MCNC: NEGATIVE MG/DL — SIGNIFICANT CHANGE UP
LACTATE SERPL-SCNC: 1.2 MMOL/L — SIGNIFICANT CHANGE UP (ref 0.7–2)
LEUKOCYTE ESTERASE UR-ACNC: NEGATIVE — SIGNIFICANT CHANGE UP
LIDOCAIN IGE QN: 28 U/L — SIGNIFICANT CHANGE UP (ref 13–75)
LYMPHOCYTES # BLD AUTO: 1.92 K/UL — SIGNIFICANT CHANGE UP (ref 1–3.3)
LYMPHOCYTES # BLD AUTO: 11 % — LOW (ref 13–44)
MCHC RBC-ENTMCNC: 29.6 PG — SIGNIFICANT CHANGE UP (ref 27–34)
MCHC RBC-ENTMCNC: 34.3 G/DL — SIGNIFICANT CHANGE UP (ref 32–36)
MCV RBC AUTO: 86.4 FL — SIGNIFICANT CHANGE UP (ref 80–100)
MONOCYTES # BLD AUTO: 1.04 K/UL — HIGH (ref 0–0.9)
MONOCYTES NFR BLD AUTO: 5.9 % — SIGNIFICANT CHANGE UP (ref 2–14)
NEUTROPHILS # BLD AUTO: 14.42 K/UL — HIGH (ref 1.8–7.4)
NEUTROPHILS NFR BLD AUTO: 82.4 % — HIGH (ref 43–77)
NITRITE UR-MCNC: NEGATIVE — SIGNIFICANT CHANGE UP
PH UR: 6.5 — SIGNIFICANT CHANGE UP (ref 5–8)
PLATELET # BLD AUTO: 305 K/UL — SIGNIFICANT CHANGE UP (ref 150–400)
POTASSIUM SERPL-MCNC: 3.8 MMOL/L — SIGNIFICANT CHANGE UP (ref 3.5–5.3)
POTASSIUM SERPL-SCNC: 3.8 MMOL/L — SIGNIFICANT CHANGE UP (ref 3.5–5.3)
PROT SERPL-MCNC: 7.9 GM/DL — SIGNIFICANT CHANGE UP (ref 6–8.3)
PROT UR-MCNC: NEGATIVE MG/DL — SIGNIFICANT CHANGE UP
RBC # BLD: 5 M/UL — SIGNIFICANT CHANGE UP (ref 4.2–5.8)
RBC # FLD: 13 % — SIGNIFICANT CHANGE UP (ref 10.3–14.5)
SODIUM SERPL-SCNC: 136 MMOL/L — SIGNIFICANT CHANGE UP (ref 135–145)
SP GR SPEC: >1.03 — HIGH (ref 1–1.03)
UROBILINOGEN FLD QL: 0.2 MG/DL — SIGNIFICANT CHANGE UP (ref 0.2–1)
WBC # BLD: 17.52 K/UL — HIGH (ref 3.8–10.5)
WBC # FLD AUTO: 17.52 K/UL — HIGH (ref 3.8–10.5)

## 2024-11-09 PROCEDURE — C1889: CPT

## 2024-11-09 PROCEDURE — C9399: CPT

## 2024-11-09 PROCEDURE — 87102 FUNGUS ISOLATION CULTURE: CPT

## 2024-11-09 PROCEDURE — 80048 BASIC METABOLIC PNL TOTAL CA: CPT

## 2024-11-09 PROCEDURE — 84100 ASSAY OF PHOSPHORUS: CPT

## 2024-11-09 PROCEDURE — 74018 RADEX ABDOMEN 1 VIEW: CPT

## 2024-11-09 PROCEDURE — 83735 ASSAY OF MAGNESIUM: CPT

## 2024-11-09 PROCEDURE — 88307 TISSUE EXAM BY PATHOLOGIST: CPT

## 2024-11-09 PROCEDURE — 87116 MYCOBACTERIA CULTURE: CPT

## 2024-11-09 PROCEDURE — 87015 SPECIMEN INFECT AGNT CONCNTJ: CPT

## 2024-11-09 PROCEDURE — 74177 CT ABD & PELVIS W/CONTRAST: CPT | Mod: 26,MC

## 2024-11-09 PROCEDURE — 85610 PROTHROMBIN TIME: CPT

## 2024-11-09 PROCEDURE — 82962 GLUCOSE BLOOD TEST: CPT

## 2024-11-09 PROCEDURE — C9290: CPT

## 2024-11-09 PROCEDURE — 87206 SMEAR FLUORESCENT/ACID STAI: CPT

## 2024-11-09 PROCEDURE — 71045 X-RAY EXAM CHEST 1 VIEW: CPT

## 2024-11-09 PROCEDURE — 85027 COMPLETE CBC AUTOMATED: CPT

## 2024-11-09 PROCEDURE — 99285 EMERGENCY DEPT VISIT HI MDM: CPT

## 2024-11-09 PROCEDURE — 84478 ASSAY OF TRIGLYCERIDES: CPT

## 2024-11-09 PROCEDURE — 99222 1ST HOSP IP/OBS MODERATE 55: CPT

## 2024-11-09 PROCEDURE — 87075 CULTR BACTERIA EXCEPT BLOOD: CPT

## 2024-11-09 PROCEDURE — 36415 COLL VENOUS BLD VENIPUNCTURE: CPT

## 2024-11-09 PROCEDURE — 86850 RBC ANTIBODY SCREEN: CPT

## 2024-11-09 PROCEDURE — 80053 COMPREHEN METABOLIC PANEL: CPT

## 2024-11-09 PROCEDURE — 74177 CT ABD & PELVIS W/CONTRAST: CPT | Mod: MC

## 2024-11-09 PROCEDURE — 86900 BLOOD TYPING SEROLOGIC ABO: CPT

## 2024-11-09 PROCEDURE — 86901 BLOOD TYPING SEROLOGIC RH(D): CPT

## 2024-11-09 PROCEDURE — 87070 CULTURE OTHR SPECIMN AEROBIC: CPT

## 2024-11-09 PROCEDURE — 85730 THROMBOPLASTIN TIME PARTIAL: CPT

## 2024-11-09 RX ORDER — LOSARTAN POTASSIUM 100 MG/1
100 TABLET, FILM COATED ORAL DAILY
Refills: 0 | Status: DISCONTINUED | OUTPATIENT
Start: 2024-11-09 | End: 2024-11-11

## 2024-11-09 RX ORDER — PIPERACILLIN SODIUM AND TAZOBACTAM SODIUM 4; .5 G/20ML; G/20ML
3.38 INJECTION, POWDER, LYOPHILIZED, FOR SOLUTION INTRAVENOUS EVERY 8 HOURS
Refills: 0 | Status: DISCONTINUED | OUTPATIENT
Start: 2024-11-09 | End: 2024-11-09

## 2024-11-09 RX ORDER — ENOXAPARIN SODIUM 30 MG/.3ML
40 INJECTION SUBCUTANEOUS EVERY 12 HOURS
Refills: 0 | Status: DISCONTINUED | OUTPATIENT
Start: 2024-11-09 | End: 2024-11-11

## 2024-11-09 RX ORDER — ONDANSETRON HYDROCHLORIDE 4 MG/1
4 TABLET, FILM COATED ORAL EVERY 6 HOURS
Refills: 0 | Status: DISCONTINUED | OUTPATIENT
Start: 2024-11-09 | End: 2024-11-20

## 2024-11-09 RX ORDER — LOSARTAN POTASSIUM AND HYDROCHLOROTHIAZIDE 12.5; 5 MG/1; MG/1
1 TABLET ORAL
Refills: 0 | DISCHARGE

## 2024-11-09 RX ORDER — 0.9 % SODIUM CHLORIDE 0.9 %
1000 INTRAVENOUS SOLUTION INTRAVENOUS
Refills: 0 | Status: DISCONTINUED | OUTPATIENT
Start: 2024-11-09 | End: 2024-11-09

## 2024-11-09 RX ORDER — ACETAMINOPHEN 500MG 500 MG/1
1000 TABLET, COATED ORAL ONCE
Refills: 0 | Status: DISCONTINUED | OUTPATIENT
Start: 2024-11-09 | End: 2024-11-11

## 2024-11-09 RX ORDER — ACETAMINOPHEN 500MG 500 MG/1
1000 TABLET, COATED ORAL EVERY 6 HOURS
Refills: 0 | Status: DISCONTINUED | OUTPATIENT
Start: 2024-11-09 | End: 2024-11-11

## 2024-11-09 RX ORDER — PIPERACILLIN SODIUM AND TAZOBACTAM SODIUM 4; .5 G/20ML; G/20ML
3.38 INJECTION, POWDER, LYOPHILIZED, FOR SOLUTION INTRAVENOUS ONCE
Refills: 0 | Status: COMPLETED | OUTPATIENT
Start: 2024-11-09 | End: 2024-11-09

## 2024-11-09 RX ORDER — HYDROMORPHONE HYDROCHLORIDE 2 MG/1
0.5 TABLET ORAL EVERY 4 HOURS
Refills: 0 | Status: DISCONTINUED | OUTPATIENT
Start: 2024-11-09 | End: 2024-11-16

## 2024-11-09 RX ORDER — PIPERACILLIN SODIUM AND TAZOBACTAM SODIUM 4; .5 G/20ML; G/20ML
3.38 INJECTION, POWDER, LYOPHILIZED, FOR SOLUTION INTRAVENOUS EVERY 8 HOURS
Refills: 0 | Status: COMPLETED | OUTPATIENT
Start: 2024-11-09 | End: 2024-11-16

## 2024-11-09 RX ORDER — SODIUM CHLORIDE 9 MG/ML
1000 INJECTION, SOLUTION INTRAMUSCULAR; INTRAVENOUS; SUBCUTANEOUS ONCE
Refills: 0 | Status: COMPLETED | OUTPATIENT
Start: 2024-11-09 | End: 2024-11-09

## 2024-11-09 RX ORDER — ACETAMINOPHEN 500MG 500 MG/1
1000 TABLET, COATED ORAL ONCE
Refills: 0 | Status: COMPLETED | OUTPATIENT
Start: 2024-11-09 | End: 2024-11-09

## 2024-11-09 RX ORDER — SODIUM CHLORIDE 9 MG/ML
1000 INJECTION, SOLUTION INTRAMUSCULAR; INTRAVENOUS; SUBCUTANEOUS
Refills: 0 | Status: DISCONTINUED | OUTPATIENT
Start: 2024-11-09 | End: 2024-11-11

## 2024-11-09 RX ADMIN — PIPERACILLIN SODIUM AND TAZOBACTAM SODIUM 25 GRAM(S): 4; .5 INJECTION, POWDER, LYOPHILIZED, FOR SOLUTION INTRAVENOUS at 21:31

## 2024-11-09 RX ADMIN — SODIUM CHLORIDE 100 MILLILITER(S): 9 INJECTION, SOLUTION INTRAMUSCULAR; INTRAVENOUS; SUBCUTANEOUS at 13:10

## 2024-11-09 RX ADMIN — ENOXAPARIN SODIUM 40 MILLIGRAM(S): 30 INJECTION SUBCUTANEOUS at 21:32

## 2024-11-09 RX ADMIN — SODIUM CHLORIDE 1000 MILLILITER(S): 9 INJECTION, SOLUTION INTRAMUSCULAR; INTRAVENOUS; SUBCUTANEOUS at 05:56

## 2024-11-09 RX ADMIN — Medication 2 MILLIGRAM(S): at 05:57

## 2024-11-09 RX ADMIN — ACETAMINOPHEN 500MG 400 MILLIGRAM(S): 500 TABLET, COATED ORAL at 05:56

## 2024-11-09 RX ADMIN — PIPERACILLIN SODIUM AND TAZOBACTAM SODIUM 200 GRAM(S): 4; .5 INJECTION, POWDER, LYOPHILIZED, FOR SOLUTION INTRAVENOUS at 06:45

## 2024-11-09 RX ADMIN — PIPERACILLIN SODIUM AND TAZOBACTAM SODIUM 25 GRAM(S): 4; .5 INJECTION, POWDER, LYOPHILIZED, FOR SOLUTION INTRAVENOUS at 13:10

## 2024-11-09 NOTE — ED ADULT NURSE NOTE - NSFALLUNIVINTERV_ED_ALL_ED
Bed/Stretcher in lowest position, wheels locked, appropriate side rails in place/Call bell, personal items and telephone in reach/Instruct patient to call for assistance before getting out of bed/chair/stretcher/Non-slip footwear applied when patient is off stretcher/[place] to call system/Physically safe environment - no spills, clutter or unnecessary equipment/Purposeful proactive rounding/Room/bathroom lighting operational, light cord in reach

## 2024-11-09 NOTE — ED ADULT NURSE REASSESSMENT NOTE - NS ED NURSE REASSESS COMMENT FT1
Spoke to surgical resident. As per resident administer Zosyn now and give normal saline instead of the LR. Verified orders twice w/ MD.

## 2024-11-09 NOTE — H&P ADULT - ATTENDING COMMENTS
Patient seen and examined in the ER this morning  He reports mostly right sided abdominal pain and discomfort  Abdomen is soft, mildly distended, focal RLQ abdominal tenderness. This correlates with inflamed redundant sigmoid colon  A/P: Keep NPO, Patient needs to started on IV fluids, continued on IV antibiotics. Plan for conservative management. He is aware that worsening clinical status will necessitate operative intervention.     Vital Signs Last 24 Hrs  T(C): 37 (09 Nov 2024 12:16), Max: 38.1 (09 Nov 2024 03:20)  T(F): 98.6 (09 Nov 2024 12:16), Max: 100.5 (09 Nov 2024 03:20)  HR: 92 (09 Nov 2024 12:16) (90 - 106)  BP: 108/58 (09 Nov 2024 12:16) (108/58 - 136/84)  BP(mean): 71 (09 Nov 2024 12:16) (71 - 96)  RR: 18 (09 Nov 2024 12:16) (18 - 18)  SpO2: 98% (09 Nov 2024 12:16) (97% - 98%)    Parameters below as of 09 Nov 2024 12:16  Patient On (Oxygen Delivery Method): room air                          14.8   17.52 )-----------( 305      ( 09 Nov 2024 05:32 )             43.2       BOWEL: No bowel obstruction. Appendix is normal. Colonic diverticulosis. Wall thickening and surrounding inflammation involving the sigmoid colon, which is redundant and located in the right lower quadrant (2, 82). Associated mild free fluid. Questionable tiny focus of adjacent free air and a small 1.0 cm region of loculated fluid (602, 33). No organized drainable fluid collection.  VESSELS: Within normal limits.  LYMPH NODES: No lymphadenopathy.  ABDOMINAL WALL: Tiny fat-containing umbilical hernia.  BONES: Within normal limits.    IMPRESSION:  Acute sigmoid diverticulitis. Questionable adjacent tiny focus of air in a small region of loculated fluid, which could reflect microperforation. No organized drainable fluid collection. Recommend follow-up colonoscopy after resolution of symptoms.

## 2024-11-09 NOTE — ED ADULT TRIAGE NOTE - CHIEF COMPLAINT QUOTE
Pt ambulatory to ED w c/o RUQ and RLQ abdominal pain since yesterday. Endorsing fevers, tmax 101, and chills. Denies n/v/d, urinary symptoms. PMH of lymphadenopathy and retroperitoneum.

## 2024-11-09 NOTE — ED PROVIDER NOTE - DIFFERENTIAL DIAGNOSIS
Pt with abdominal pain, CT shows perforated diverticulitis, surgery consulted, s/p abx. Pt also with fever, tylenol provided. Pending surgical eval, anticipate admission. Sign out given to the am ER physician. Differential Diagnosis

## 2024-11-09 NOTE — ED ADULT NURSE REASSESSMENT NOTE - NS ED NURSE REASSESS COMMENT FT1
Pt handoff received by MIGUE Arroyo. PT is A/O x4, verbalizes pain has decreased since he has arrived to ED. PT is aware of the plan, safety and comfort measures in place.

## 2024-11-09 NOTE — H&P ADULT - NSHPLABSRESULTS_GEN_ALL_CORE
11-09 @ 05:32                    14.8  CBC: 17.52>)-------(<305                     43.2                 136 | 105 | 21    CMP:  ----------------------< 123               3.8 | 26 | 1.26                      Ca:9.7  Phos:-  Mg:-               0.8|      |13        LFTs:  ------|71|-----             -|      |-      Urinalysis with Rflx Culture (collected 11-09-24 @ 06:06)      Current Inpatient Medications:  acetaminophen   IVPB .. 1000 milliGRAM(s) IV Intermittent once  enoxaparin Injectable 40 milliGRAM(s) SubCutaneous every 12 hours  lactated ringers. 1000 milliLiter(s) (150 mL/Hr) IV Continuous <Continuous>  ondansetron Injectable 4 milliGRAM(s) IV Push every 6 hours PRN  piperacillin/tazobactam IVPB.. 3.375 Gram(s) IV Intermittent every 8 hours      < from: CT Abdomen and Pelvis w/ IV Cont (11.09.24 @ 05:59) >    PROCEDURE:  CT of the Abdomen and Pelvis was performed.  Sagittal and coronal reformats were performed.    FINDINGS:  LOWER CHEST: Stable 3 mm left lower lobe nodule (2, 6). Mild bibasilar   atelectasis.    LIVER: Within normal limits.  BILE DUCTS: Normal caliber.  GALLBLADDER: Cholecystectomy.  SPLEEN: Within normal limits.  PANCREAS: Within normal limits.  ADRENALS: Within normal limits.  KIDNEYS/URETERS: Within normal limits.    BLADDER: Within normal limits.  REPRODUCTIVE ORGANS: Normal-sized prostate.    BOWEL: No bowel obstruction. Appendix is normal. Colonic diverticulosis.   Wall thickening and surrounding inflammation involving the sigmoid colon,   which is redundant and located in the right lower quadrant (2, 82).   Associated mild free fluid. Questionable tiny focus of adjacent free air   and a small 1.0 cm region of loculated fluid (602, 33). No organized   drainable fluid collection.  VESSELS: Within normal limits.  LYMPH NODES: No lymphadenopathy.  ABDOMINAL WALL: Tiny fat-containing umbilical hernia.  BONES: Within normal limits.    IMPRESSION:  Acute sigmoid diverticulitis. Questionable adjacent tiny focus of air in   a small region of loculated fluid, which could reflect microperforation.   No organized drainable fluid collection. Recommend follow-up colonoscopy   after resolution of symptoms.    < end of copied text >

## 2024-11-09 NOTE — H&P ADULT - HISTORY OF PRESENT ILLNESS
39 year old male with PMH of recent dx of diverticulitis about four weeks ago, started on ABx as outpatient, but didn't complete the course,  presents with cc of abdominal pain for a day in lower abdomen,  mild nausea no emesis. Passing gas, Last BM yesterday   in ED hemodynamically stable, TTP on lower abd, WBC 17  CT w Acute sigmoid diverticulitis w tiny focus of adjacent free air and a small 1.0 cm region of loculated fluid , No organized drainable fluid collection.

## 2024-11-09 NOTE — ED ADULT TRIAGE NOTE - WEIGHT IN KG
Detail Level: Detailed Quality 226: Preventive Care And Screening: Tobacco Use: Screening And Cessation Intervention: Patient screened for tobacco use and is an ex/non-smoker 118.8

## 2024-11-09 NOTE — H&P ADULT - ASSESSMENT
39 year old male with PMH of recent dx of diverticulitis about four weeks ago, started on ABx as outpatient, but didn't complete the course,  presents with cc of abdominal pain for a day in lower abdomen,  mild nausea no emesis. Passing gas, Last BM yesterday   in ED hemodynamically stable, TTP on lower abd, WBC 17  CT w Acute sigmoid diverticulitis w tiny focus of adjacent free air and a small 1.0 cm region of loculated fluid , No organized drainable fluid collection.     Plan:  admit to [facility]  NPO  IV fluids  IV ABx  daily CBC  DVT ppx  Ambulate  pain control PRN  TREVOR

## 2024-11-09 NOTE — ED PROVIDER NOTE - PROGRESS NOTE DETAILS
Nikky BINGHAM: Pt with abdominal pain, CT shows perforated diverticulitis, surgery consulted, s/p abx. Pt also with fever, tylenol provided. Pending surgical eval, anticipate admission. Sign out given to the am ER physician.

## 2024-11-09 NOTE — H&P ADULT - NSHPPHYSICALEXAM_GEN_ALL_CORE
Vitals:  T(C): 37 (11-09 @ 12:16), Max: 38.1 (11-09 @ 03:20)  HR: 92 (11-09 @ 12:16) (90 - 106)  BP: 108/58 (11-09 @ 12:16) (108/58 - 136/84)  RR: 18 (11-09 @ 12:16) (18 - 18)  SpO2: 98% (11-09 @ 12:16) (97% - 98%)      Physical Exam:  General:  Aox3. NAD  Chest: Normal respiratory effort  Heart: RRR  Abdomen: Soft, ND, TTP on lower abdomen, no R/G   Neuro/Psych: No localized deficits. Normal spech, normal tone  Skin: Normal, no rashes, no lesions noted.   Extremities: Warm, well perfused, no edema, Pulses intact

## 2024-11-09 NOTE — ED ADULT NURSE NOTE - OBJECTIVE STATEMENT
39yM AOx4 ambulatory, presents to  ED c/o 6/10 RUQ abdominal pain since yesterday. pt was seen at urgent care and prescribed cipro and flagyl and states he "did not finish the medication". pt endorses loose stool, fever/chills. pt denies N/V, blood in stool/urine, and pain radiation. no pain meds taken PTA. Iv inserted, labs drawn and sent.

## 2024-11-09 NOTE — ED PROVIDER NOTE - OBJECTIVE STATEMENT
39 year old male with PMH of recent dx of diverticulosis, and diverticulitis recently placed on abx presents with cc of abdominal pain, fever, worse in RLQ and RUQ. + chills. Post prandial exacerbation of pain. Hx of cholecystectomy. No visual or focal neurological complaints. No recent trauma. No melena or hematochezia. No dysuria hematuria or frequency.

## 2024-11-09 NOTE — ED PROVIDER NOTE - CLINICAL SUMMARY MEDICAL DECISION MAKING FREE TEXT BOX
Pt with abdominal pain, CT shows perforated diverticulitis, surgery consulted, s/p abx. Pt also with fever, tylenol provided. Pending surgical eval, anticipate admission. Sign out given to the am ER physician.

## 2024-11-09 NOTE — PATIENT PROFILE ADULT - INTERPRETATION SERVICES DECLINED
SW/CM Note     Progress Note  Consult received for new nursing home placement. Chart review completed. Update received from RN that pt was able to wiggle her fingers today and purposefully move her thumb. Will monitor pt's progress and see if she is appropriate for Healthcare Power of  completion in the days to come. Assessment to follow.    June Hoover MSW, APSW  Phone: [phone]   no need for interpretor services;  patient speaks well

## 2024-11-09 NOTE — ED PROVIDER NOTE - CPE EDP CARDIAC NORM
normal... Gerson Yates (MD)  Cardiac Electrophysiology; Cardiovascular Disease; Internal Medicine  [address] [place] [address]  Phone: [phone]  Fax: [phone]  Established Patient  Scheduled Appointment: 01/18/2023 11:40 AM

## 2024-11-10 LAB
ANION GAP SERPL CALC-SCNC: 6 MMOL/L — SIGNIFICANT CHANGE UP (ref 5–17)
BUN SERPL-MCNC: 19 MG/DL — SIGNIFICANT CHANGE UP (ref 7–23)
CALCIUM SERPL-MCNC: 9.6 MG/DL — SIGNIFICANT CHANGE UP (ref 8.5–10.1)
CHLORIDE SERPL-SCNC: 103 MMOL/L — SIGNIFICANT CHANGE UP (ref 96–108)
CO2 SERPL-SCNC: 28 MMOL/L — SIGNIFICANT CHANGE UP (ref 22–31)
CREAT SERPL-MCNC: 1.19 MG/DL — SIGNIFICANT CHANGE UP (ref 0.5–1.3)
EGFR: 81 ML/MIN/1.73M2 — SIGNIFICANT CHANGE UP
GLUCOSE SERPL-MCNC: 112 MG/DL — HIGH (ref 70–99)
HCT VFR BLD CALC: 42.8 % — SIGNIFICANT CHANGE UP (ref 39–50)
HGB BLD-MCNC: 14.3 G/DL — SIGNIFICANT CHANGE UP (ref 13–17)
MAGNESIUM SERPL-MCNC: 2.1 MG/DL — SIGNIFICANT CHANGE UP (ref 1.6–2.6)
MCHC RBC-ENTMCNC: 29.4 PG — SIGNIFICANT CHANGE UP (ref 27–34)
MCHC RBC-ENTMCNC: 33.4 G/DL — SIGNIFICANT CHANGE UP (ref 32–36)
MCV RBC AUTO: 87.9 FL — SIGNIFICANT CHANGE UP (ref 80–100)
PHOSPHATE SERPL-MCNC: 2.9 MG/DL — SIGNIFICANT CHANGE UP (ref 2.5–4.5)
PLATELET # BLD AUTO: 286 K/UL — SIGNIFICANT CHANGE UP (ref 150–400)
POTASSIUM SERPL-MCNC: 3.3 MMOL/L — LOW (ref 3.5–5.3)
POTASSIUM SERPL-SCNC: 3.3 MMOL/L — LOW (ref 3.5–5.3)
RBC # BLD: 4.87 M/UL — SIGNIFICANT CHANGE UP (ref 4.2–5.8)
RBC # FLD: 13.2 % — SIGNIFICANT CHANGE UP (ref 10.3–14.5)
SODIUM SERPL-SCNC: 137 MMOL/L — SIGNIFICANT CHANGE UP (ref 135–145)
WBC # BLD: 18.39 K/UL — HIGH (ref 3.8–10.5)
WBC # FLD AUTO: 18.39 K/UL — HIGH (ref 3.8–10.5)

## 2024-11-10 PROCEDURE — 99232 SBSQ HOSP IP/OBS MODERATE 35: CPT | Mod: 57

## 2024-11-10 RX ORDER — ACETAMINOPHEN 500MG 500 MG/1
1000 TABLET, COATED ORAL EVERY 6 HOURS
Refills: 0 | Status: COMPLETED | OUTPATIENT
Start: 2024-11-10 | End: 2024-11-10

## 2024-11-10 RX ORDER — POTASSIUM CHLORIDE 600 MG/1
10 TABLET, EXTENDED RELEASE ORAL ONCE
Refills: 0 | Status: DISCONTINUED | OUTPATIENT
Start: 2024-11-10 | End: 2024-11-10

## 2024-11-10 RX ORDER — ACETAMINOPHEN 500MG 500 MG/1
650 TABLET, COATED ORAL EVERY 8 HOURS
Refills: 0 | Status: DISCONTINUED | OUTPATIENT
Start: 2024-11-10 | End: 2024-11-10

## 2024-11-10 RX ORDER — POTASSIUM CHLORIDE 600 MG/1
10 TABLET, EXTENDED RELEASE ORAL
Refills: 0 | Status: COMPLETED | OUTPATIENT
Start: 2024-11-10 | End: 2024-11-10

## 2024-11-10 RX ORDER — POTASSIUM CHLORIDE 600 MG/1
20 TABLET, EXTENDED RELEASE ORAL ONCE
Refills: 0 | Status: DISCONTINUED | OUTPATIENT
Start: 2024-11-10 | End: 2024-11-10

## 2024-11-10 RX ADMIN — ENOXAPARIN SODIUM 40 MILLIGRAM(S): 30 INJECTION SUBCUTANEOUS at 10:47

## 2024-11-10 RX ADMIN — ACETAMINOPHEN 500MG 1000 MILLIGRAM(S): 500 TABLET, COATED ORAL at 22:15

## 2024-11-10 RX ADMIN — PIPERACILLIN SODIUM AND TAZOBACTAM SODIUM 25 GRAM(S): 4; .5 INJECTION, POWDER, LYOPHILIZED, FOR SOLUTION INTRAVENOUS at 05:56

## 2024-11-10 RX ADMIN — ENOXAPARIN SODIUM 40 MILLIGRAM(S): 30 INJECTION SUBCUTANEOUS at 21:48

## 2024-11-10 RX ADMIN — PIPERACILLIN SODIUM AND TAZOBACTAM SODIUM 25 GRAM(S): 4; .5 INJECTION, POWDER, LYOPHILIZED, FOR SOLUTION INTRAVENOUS at 13:13

## 2024-11-10 RX ADMIN — POTASSIUM CHLORIDE 100 MILLIEQUIVALENT(S): 600 TABLET, EXTENDED RELEASE ORAL at 20:57

## 2024-11-10 RX ADMIN — HYDROMORPHONE HYDROCHLORIDE 0.5 MILLIGRAM(S): 2 TABLET ORAL at 06:40

## 2024-11-10 RX ADMIN — SODIUM CHLORIDE 100 MILLILITER(S): 9 INJECTION, SOLUTION INTRAMUSCULAR; INTRAVENOUS; SUBCUTANEOUS at 15:54

## 2024-11-10 RX ADMIN — ACETAMINOPHEN 500MG 400 MILLIGRAM(S): 500 TABLET, COATED ORAL at 21:47

## 2024-11-10 RX ADMIN — HYDROMORPHONE HYDROCHLORIDE 0.5 MILLIGRAM(S): 2 TABLET ORAL at 05:57

## 2024-11-10 RX ADMIN — PIPERACILLIN SODIUM AND TAZOBACTAM SODIUM 25 GRAM(S): 4; .5 INJECTION, POWDER, LYOPHILIZED, FOR SOLUTION INTRAVENOUS at 22:26

## 2024-11-10 RX ADMIN — POTASSIUM CHLORIDE 100 MILLIEQUIVALENT(S): 600 TABLET, EXTENDED RELEASE ORAL at 19:58

## 2024-11-10 NOTE — PROGRESS NOTE ADULT - ASSESSMENT
39 year old male with PMH of recent dx of diverticulitis about four weeks ago, started on ABx as outpatient, but didn't complete the course,  presents with cc of abdominal pain for a day in lower abdomen,  mild nausea no emesis. Passing gas, Last BM yesterday   in ED hemodynamically stable, TTP on lower abd, WBC 17  CT w Acute sigmoid diverticulitis w tiny focus of adjacent free air and a small 1.0 cm region of loculated fluid , No organized drainable fluid collection.     Plan:  NPO  IV fluids  f/u Am labs  IV ABx: Zosyn   daily CBC  DVT ppx  Ambulate  pain control PRN  TREVOR  monitor fever  home meds

## 2024-11-10 NOTE — PROGRESS NOTE ADULT - ATTENDING COMMENTS
Patient seen and examined at bedside this morning  He reports mostly right sided and suprapubic abdominal pain that has improved since presentation  Abdomen is soft, nondistended, focal RLQ abdominal tenderness  A/P: Keep NPO, continue IV fluids and antibiotics, continue to trend WBC, slight increase noted. Plan for continued conservative management. He is aware that worsening clinical status will necessitate operative intervention.     Vital Signs Last 24 Hrs  T(C): 37.3 (10 Nov 2024 08:25), Max: 37.8 (10 Nov 2024 00:29)  T(F): 99.2 (10 Nov 2024 08:25), Max: 100 (10 Nov 2024 00:29)  HR: 90 (10 Nov 2024 08:25) (90 - 100)  BP: 130/68 (10 Nov 2024 08:25) (118/61 - 130/68)  BP(mean): 88 (09 Nov 2024 13:57) (88 - 88)  RR: 18 (10 Nov 2024 08:25) (18 - 20)  SpO2: 95% (10 Nov 2024 08:25) (94% - 98%)    Parameters below as of 10 Nov 2024 08:25  Patient On (Oxygen Delivery Method): room air                          14.3   18.39 )-----------( 286      ( 10 Nov 2024 09:17 )             42.8

## 2024-11-11 ENCOUNTER — RESULT REVIEW (OUTPATIENT)
Age: 39
End: 2024-11-11

## 2024-11-11 LAB
ALBUMIN SERPL ELPH-MCNC: 3.1 G/DL — LOW (ref 3.3–5)
ALP SERPL-CCNC: 63 U/L — SIGNIFICANT CHANGE UP (ref 40–120)
ALT FLD-CCNC: 25 U/L — SIGNIFICANT CHANGE UP (ref 12–78)
ANION GAP SERPL CALC-SCNC: 7 MMOL/L — SIGNIFICANT CHANGE UP (ref 5–17)
APTT BLD: 29.1 SEC — SIGNIFICANT CHANGE UP (ref 24.5–35.6)
AST SERPL-CCNC: 11 U/L — LOW (ref 15–37)
BILIRUB SERPL-MCNC: 0.9 MG/DL — SIGNIFICANT CHANGE UP (ref 0.2–1.2)
BUN SERPL-MCNC: 21 MG/DL — SIGNIFICANT CHANGE UP (ref 7–23)
CALCIUM SERPL-MCNC: 9.2 MG/DL — SIGNIFICANT CHANGE UP (ref 8.5–10.1)
CHLORIDE SERPL-SCNC: 107 MMOL/L — SIGNIFICANT CHANGE UP (ref 96–108)
CO2 SERPL-SCNC: 23 MMOL/L — SIGNIFICANT CHANGE UP (ref 22–31)
CREAT SERPL-MCNC: 1.12 MG/DL — SIGNIFICANT CHANGE UP (ref 0.5–1.3)
EGFR: 87 ML/MIN/1.73M2 — SIGNIFICANT CHANGE UP
GLUCOSE BLDC GLUCOMTR-MCNC: 105 MG/DL — HIGH (ref 70–99)
GLUCOSE BLDC GLUCOMTR-MCNC: 134 MG/DL — HIGH (ref 70–99)
GLUCOSE SERPL-MCNC: 118 MG/DL — HIGH (ref 70–99)
HCT VFR BLD CALC: 41.9 % — SIGNIFICANT CHANGE UP (ref 39–50)
HGB BLD-MCNC: 14.3 G/DL — SIGNIFICANT CHANGE UP (ref 13–17)
INR BLD: 1.08 RATIO — SIGNIFICANT CHANGE UP (ref 0.85–1.16)
MAGNESIUM SERPL-MCNC: 2.3 MG/DL — SIGNIFICANT CHANGE UP (ref 1.6–2.6)
MCHC RBC-ENTMCNC: 29.4 PG — SIGNIFICANT CHANGE UP (ref 27–34)
MCHC RBC-ENTMCNC: 34.1 G/DL — SIGNIFICANT CHANGE UP (ref 32–36)
MCV RBC AUTO: 86.2 FL — SIGNIFICANT CHANGE UP (ref 80–100)
PHOSPHATE SERPL-MCNC: 1.8 MG/DL — LOW (ref 2.5–4.5)
PLATELET # BLD AUTO: 292 K/UL — SIGNIFICANT CHANGE UP (ref 150–400)
POTASSIUM SERPL-MCNC: 3.2 MMOL/L — LOW (ref 3.5–5.3)
POTASSIUM SERPL-SCNC: 3.2 MMOL/L — LOW (ref 3.5–5.3)
PROT SERPL-MCNC: 7.9 GM/DL — SIGNIFICANT CHANGE UP (ref 6–8.3)
PROTHROM AB SERPL-ACNC: 12.4 SEC — SIGNIFICANT CHANGE UP (ref 9.9–13.4)
RBC # BLD: 4.86 M/UL — SIGNIFICANT CHANGE UP (ref 4.2–5.8)
RBC # FLD: 13.2 % — SIGNIFICANT CHANGE UP (ref 10.3–14.5)
SODIUM SERPL-SCNC: 137 MMOL/L — SIGNIFICANT CHANGE UP (ref 135–145)
WBC # BLD: 15.29 K/UL — HIGH (ref 3.8–10.5)
WBC # FLD AUTO: 15.29 K/UL — HIGH (ref 3.8–10.5)

## 2024-11-11 PROCEDURE — 74177 CT ABD & PELVIS W/CONTRAST: CPT | Mod: 26

## 2024-11-11 PROCEDURE — 44143 PARTIAL REMOVAL OF COLON: CPT

## 2024-11-11 PROCEDURE — 88307 TISSUE EXAM BY PATHOLOGIST: CPT | Mod: 26

## 2024-11-11 RX ORDER — ONDANSETRON HYDROCHLORIDE 4 MG/1
4 TABLET, FILM COATED ORAL ONCE
Refills: 0 | Status: DISCONTINUED | OUTPATIENT
Start: 2024-11-11 | End: 2024-11-11

## 2024-11-11 RX ORDER — POTASSIUM CHLORIDE 600 MG/1
10 TABLET, EXTENDED RELEASE ORAL
Refills: 0 | Status: DISCONTINUED | OUTPATIENT
Start: 2024-11-11 | End: 2024-11-20

## 2024-11-11 RX ORDER — KETOROLAC TROMETHAMINE 30 MG/ML
15 INJECTION INTRAMUSCULAR; INTRAVENOUS EVERY 6 HOURS
Refills: 0 | Status: DISCONTINUED | OUTPATIENT
Start: 2024-11-12 | End: 2024-11-12

## 2024-11-11 RX ORDER — FENTANYL 12 UG/H
50 PATCH, EXTENDED RELEASE TRANSDERMAL
Refills: 0 | Status: DISCONTINUED | OUTPATIENT
Start: 2024-11-11 | End: 2024-11-11

## 2024-11-11 RX ORDER — POTASSIUM CHLORIDE 600 MG/1
20 TABLET, EXTENDED RELEASE ORAL
Refills: 0 | Status: DISCONTINUED | OUTPATIENT
Start: 2024-11-11 | End: 2024-11-11

## 2024-11-11 RX ORDER — ENALAPRIL MALEATE 2.5 MG/1
1.25 TABLET ORAL EVERY 6 HOURS
Refills: 0 | Status: DISCONTINUED | OUTPATIENT
Start: 2024-11-11 | End: 2024-11-20

## 2024-11-11 RX ORDER — HEPARIN SODIUM 5000 [USP'U]/.5ML
30 INJECTION, SOLUTION INTRAVENOUS; SUBCUTANEOUS ONCE
Refills: 0 | Status: DISCONTINUED | OUTPATIENT
Start: 2024-11-11 | End: 2024-11-12

## 2024-11-11 RX ORDER — SODIUM CHLORIDE 9 MG/ML
1000 INJECTION, SOLUTION INTRAMUSCULAR; INTRAVENOUS; SUBCUTANEOUS
Refills: 0 | Status: DISCONTINUED | OUTPATIENT
Start: 2024-11-11 | End: 2024-11-12

## 2024-11-11 RX ORDER — HYDROMORPHONE HYDROCHLORIDE 2 MG/1
0.5 TABLET ORAL
Refills: 0 | Status: DISCONTINUED | OUTPATIENT
Start: 2024-11-11 | End: 2024-11-11

## 2024-11-11 RX ORDER — ACETAMINOPHEN 500MG 500 MG/1
1000 TABLET, COATED ORAL ONCE
Refills: 0 | Status: DISCONTINUED | OUTPATIENT
Start: 2024-11-11 | End: 2024-11-20

## 2024-11-11 RX ORDER — 0.9 % SODIUM CHLORIDE 0.9 %
1000 INTRAVENOUS SOLUTION INTRAVENOUS
Refills: 0 | Status: DISCONTINUED | OUTPATIENT
Start: 2024-11-11 | End: 2024-11-11

## 2024-11-11 RX ADMIN — SODIUM CHLORIDE 100 MILLILITER(S): 9 INJECTION, SOLUTION INTRAMUSCULAR; INTRAVENOUS; SUBCUTANEOUS at 10:58

## 2024-11-11 RX ADMIN — POTASSIUM CHLORIDE 100 MILLIEQUIVALENT(S): 600 TABLET, EXTENDED RELEASE ORAL at 12:19

## 2024-11-11 RX ADMIN — PIPERACILLIN SODIUM AND TAZOBACTAM SODIUM 25 GRAM(S): 4; .5 INJECTION, POWDER, LYOPHILIZED, FOR SOLUTION INTRAVENOUS at 22:44

## 2024-11-11 RX ADMIN — HYDROMORPHONE HYDROCHLORIDE 0.5 MILLIGRAM(S): 2 TABLET ORAL at 22:57

## 2024-11-11 RX ADMIN — PIPERACILLIN SODIUM AND TAZOBACTAM SODIUM 25 GRAM(S): 4; .5 INJECTION, POWDER, LYOPHILIZED, FOR SOLUTION INTRAVENOUS at 05:12

## 2024-11-11 RX ADMIN — POTASSIUM CHLORIDE 100 MILLIEQUIVALENT(S): 600 TABLET, EXTENDED RELEASE ORAL at 13:29

## 2024-11-11 RX ADMIN — SODIUM CHLORIDE 100 MILLILITER(S): 9 INJECTION, SOLUTION INTRAMUSCULAR; INTRAVENOUS; SUBCUTANEOUS at 02:36

## 2024-11-11 RX ADMIN — ENOXAPARIN SODIUM 40 MILLIGRAM(S): 30 INJECTION SUBCUTANEOUS at 09:18

## 2024-11-11 NOTE — DIETITIAN INITIAL EVALUATION ADULT - ORAL INTAKE PTA/DIET HISTORY
Endorses minimal PO intake since Friday night 2/2 abdominal pain. Last meal consumed Friday night consisted of pork chops and rice. Normally has good appetite, denies having any previous symptoms.

## 2024-11-11 NOTE — PROGRESS NOTE ADULT - ATTENDING COMMENTS
Patient seen and examined at bedside this morning  He reports mostly right sided and suprapubic abdominal pain that has not improved compared to yesterday. Patient seem more uncomfortable today  Abdomen is soft, nondistended, focal RLQ and suprapubic tenderness  A/P: Repeat CT AP, keep NPO, continue IV fluids and antibiotics, trend WBC, I discussed with the patient this morning that he is not improving with conservative measures and will most likely need operative intervention today. We discussed laparoscopic sigmoid resection, creation of end colostomy, possible open. The risks were also discussed. Patient understood our discussion and will proceed with surgery. Follow up CT, pre-op for the OR.     Vital Signs Last 24 Hrs  T(C): 37.1 (11 Nov 2024 08:39), Max: 37.6 (10 Nov 2024 20:19)  T(F): 98.8 (11 Nov 2024 08:39), Max: 99.7 (10 Nov 2024 20:19)  HR: 74 (11 Nov 2024 08:39) (74 - 95)  BP: 145/92 (11 Nov 2024 08:39) (136/76 - 145/92)  BP(mean): --  RR: 18 (10 Nov 2024 20:19) (18 - 18)  SpO2: 96% (11 Nov 2024 08:39) (94% - 97%)    Parameters below as of 11 Nov 2024 08:39  Patient On (Oxygen Delivery Method): room air                          14.3   18.39 )-----------( 286      ( 10 Nov 2024 09:17 )             42.8

## 2024-11-11 NOTE — DIETITIAN INITIAL EVALUATION ADULT - PERTINENT LABORATORY DATA
11-11    137  |  107  |  21  ----------------------------<  118[H]  3.2[L]   |  23  |  1.12    Ca    9.2      11 Nov 2024 08:22  Phos  1.8     11-11  Mg     2.3     11-11    TPro  7.9  /  Alb  3.1[L]  /  TBili  0.9  /  DBili  x   /  AST  11[L]  /  ALT  25  /  AlkPhos  63  11-11

## 2024-11-11 NOTE — DIETITIAN INITIAL EVALUATION ADULT - ADD RECOMMEND
1) Suggest advance diet to Clear Liquids to Full Liquids to Low Fiber, when medically feasible and as tolerated. If unable to advance PO diet within 48 hrs then highly suggest to initiate PPN - please consult RD for PN recs if to pursue.   2) Closely monitor bowel movements.   3) MVI w/ minerals daily to ensure 100% RDA met  4) Obtain weekly wt to track/trend changes  5) Monitor daily lytes/min and replete prn. Consider to obtain vitamin D 25OH level to assess nutriture  6) Will provide low fiber diet education (or colostomy MNT education, pending ?surgery) once diet advances. F/u w/ colorectal surgery upon d/c.  RD will continue to monitor NPO status, labs, hydration, and wt prn.

## 2024-11-11 NOTE — BRIEF OPERATIVE NOTE - NSICDXBRIEFPREOP_GEN_ALL_CORE_FT
PRE-OP DIAGNOSIS:  Diverticulitis of large intestine with complication 11-Nov-2024 19:15:08  Kierra Herrera

## 2024-11-11 NOTE — BRIEF OPERATIVE NOTE - NSICDXBRIEFPOSTOP_GEN_ALL_CORE_FT
POST-OP DIAGNOSIS:  Diverticulitis of large intestine with complication 11-Nov-2024 19:15:25  Kierra Herrera

## 2024-11-11 NOTE — DIETITIAN INITIAL EVALUATION ADULT - NS FNS DIET ORDER
Diet, NPO:      Special Instructions for Nursing:  NPO 2 hours prior to surgery (11-11-24 @ 12:31)  Diet, Clear Liquid:      Special Instructions for Nursing:  Start clear liquid diet the day before surgery after dinner meal and continue until 2 hours prior to surgery.  Give Ensure Pre-Surgery 2 hours prior to surgery then NPO (11-11-24 @ 12:31)  Diet, Full Liquid:      Special Instructions for Nursing:  Full liquid diet the day before surgery including dinner (11-11-24 @ 12:31)

## 2024-11-11 NOTE — DIETITIAN INITIAL EVALUATION ADULT - OTHER INFO
39 year old male with PMH of recent dx of diverticulitis about four weeks ago, started on ABx as outpatient, but didn't complete the course, presents with cc of abdominal pain for a day in lower abdomen, mild nausea w/o emesis. Passing gas, Last BM yesterday (11/8/24). CT w/ Acute sigmoid diverticulitis w/ tiny focus of adjacent free air and a small 1.0 cm region of loculated fluid. No organized drainable fluid collection. Colorectal surgery following, pending ?possible surgery w/ possible colostomy creation; pending repeat CT scan.    Currently NPO. Abdominal pain not improving, remains persistent. C/o persistent diarrhea since admission. UBW stated between 250-255#. Current Bed scale wt of 250# taken by RD on 11/11/24; no pertinent wt changes noted at this time. NFPE does not reveal any muscle/fat wasting at this time. Will closely monitor PO intake/ tolerance. Will provide low fiber (or possible colostomy MNT education, pending on if to undergo surgery) once PO diet advances. If unable to advance PO diet within 48 hrs then highly recommend to initiate PPN to better meet ENN - please consult RD if to initiate PN. See recs below.

## 2024-11-11 NOTE — PROGRESS NOTE ADULT - ASSESSMENT
39 year old male with PMH of recent dx of diverticulitis about four weeks ago, started on ABx as outpatient, but didn't complete the course,  presents with cc of abdominal pain for a day in lower abdomen,  mild nausea no emesis. Passing gas, Last BM yesterday   in ED hemodynamically stable, TTP on lower abd, WBC 17-> 18  CT w Acute sigmoid diverticulitis w tiny focus of adjacent free air and a small 1.0 cm region of loculated fluid , No organized drainable fluid collection.     Plan:  NPO  IV fluids  f/u Am labs  IV ABx: Zosyn   daily CBC  DVT ppx  Ambulate  pain control PRN  monitor fever  home meds    Plan to be discussed with attending and updated

## 2024-11-11 NOTE — DIETITIAN INITIAL EVALUATION ADULT - PERTINENT MEDS FT
MEDICATIONS  (STANDING):  acetaminophen   IVPB .. 1000 milliGRAM(s) IV Intermittent once  enoxaparin Injectable 40 milliGRAM(s) SubCutaneous every 12 hours  lactated ringers. 1000 milliLiter(s) (75 mL/Hr) IV Continuous <Continuous>  losartan 100 milliGRAM(s) Oral daily  piperacillin/tazobactam IVPB.. 3.375 Gram(s) IV Intermittent every 8 hours  potassium chloride  10 mEq/100 mL IVPB 10 milliEquivalent(s) IV Intermittent every 1 hour  sodium chloride 0.9%. 1000 milliLiter(s) (100 mL/Hr) IV Continuous <Continuous>  sodium phosphate 30 milliMole(s)/500 mL IVPB 30 milliMole(s) IV Intermittent once    MEDICATIONS  (PRN):  acetaminophen   IVPB .. 1000 milliGRAM(s) IV Intermittent every 6 hours PRN Temp greater or equal to 38C (100.4F), Mild Pain (1 - 3)  fentaNYL    Injectable 50 MICROGram(s) IV Push every 5 minutes PRN Severe Pain (7 - 10)  HYDROmorphone  Injectable 0.5 milliGRAM(s) IV Push every 10 minutes PRN Moderate Pain (4 - 6)  HYDROmorphone  Injectable 0.5 milliGRAM(s) IV Push every 4 hours PRN Severe Pain (7 - 10)  ondansetron Injectable 4 milliGRAM(s) IV Push once PRN Nausea and/or Vomiting  ondansetron Injectable 4 milliGRAM(s) IV Push every 6 hours PRN Nausea

## 2024-11-12 ENCOUNTER — TRANSCRIPTION ENCOUNTER (OUTPATIENT)
Age: 39
End: 2024-11-12

## 2024-11-12 LAB
GRAM STN FLD: SIGNIFICANT CHANGE UP
NIGHT BLUE STAIN TISS: SIGNIFICANT CHANGE UP
SPECIMEN SOURCE: SIGNIFICANT CHANGE UP
SPECIMEN SOURCE: SIGNIFICANT CHANGE UP

## 2024-11-12 RX ORDER — POTASSIUM PHOSPHATE, MONOBASIC POTASSIUM PHOSPHATE, DIBASIC INJECTION, 236; 224 MG/ML; MG/ML
30 SOLUTION, CONCENTRATE INTRAVENOUS ONCE
Refills: 0 | Status: COMPLETED | OUTPATIENT
Start: 2024-11-12 | End: 2024-11-12

## 2024-11-12 RX ORDER — ELECTROLYTE-M SOLUTION/D5W 5 %
1000 INTRAVENOUS SOLUTION INTRAVENOUS
Refills: 0 | Status: DISCONTINUED | OUTPATIENT
Start: 2024-11-12 | End: 2024-11-14

## 2024-11-12 RX ORDER — HEPARIN SODIUM 5000 [USP'U]/.5ML
30 INJECTION, SOLUTION INTRAVENOUS; SUBCUTANEOUS ONCE
Refills: 0 | Status: DISCONTINUED | OUTPATIENT
Start: 2024-11-12 | End: 2024-11-12

## 2024-11-12 RX ORDER — HEPARIN SODIUM,PORCINE 1000/ML
5000 VIAL (ML) INJECTION EVERY 8 HOURS
Refills: 0 | Status: DISCONTINUED | OUTPATIENT
Start: 2024-11-12 | End: 2024-11-20

## 2024-11-12 RX ORDER — SODIUM CHLORIDE 9 MG/ML
1000 INJECTION, SOLUTION INTRAMUSCULAR; INTRAVENOUS; SUBCUTANEOUS ONCE
Refills: 0 | Status: COMPLETED | OUTPATIENT
Start: 2024-11-12 | End: 2024-11-12

## 2024-11-12 RX ADMIN — KETOROLAC TROMETHAMINE 15 MILLIGRAM(S): 30 INJECTION INTRAMUSCULAR; INTRAVENOUS at 06:10

## 2024-11-12 RX ADMIN — PIPERACILLIN SODIUM AND TAZOBACTAM SODIUM 25 GRAM(S): 4; .5 INJECTION, POWDER, LYOPHILIZED, FOR SOLUTION INTRAVENOUS at 14:38

## 2024-11-12 RX ADMIN — HYDROMORPHONE HYDROCHLORIDE 0.5 MILLIGRAM(S): 2 TABLET ORAL at 11:25

## 2024-11-12 RX ADMIN — Medication 5000 UNIT(S): at 10:43

## 2024-11-12 RX ADMIN — PIPERACILLIN SODIUM AND TAZOBACTAM SODIUM 25 GRAM(S): 4; .5 INJECTION, POWDER, LYOPHILIZED, FOR SOLUTION INTRAVENOUS at 06:10

## 2024-11-12 RX ADMIN — HYDROMORPHONE HYDROCHLORIDE 0.5 MILLIGRAM(S): 2 TABLET ORAL at 03:26

## 2024-11-12 RX ADMIN — Medication 125 MILLILITER(S): at 11:59

## 2024-11-12 RX ADMIN — KETOROLAC TROMETHAMINE 15 MILLIGRAM(S): 30 INJECTION INTRAMUSCULAR; INTRAVENOUS at 01:39

## 2024-11-12 RX ADMIN — HYDROMORPHONE HYDROCHLORIDE 0.5 MILLIGRAM(S): 2 TABLET ORAL at 18:55

## 2024-11-12 RX ADMIN — Medication 5000 UNIT(S): at 22:22

## 2024-11-12 RX ADMIN — HYDROMORPHONE HYDROCHLORIDE 0.5 MILLIGRAM(S): 2 TABLET ORAL at 15:25

## 2024-11-12 RX ADMIN — Medication 5000 UNIT(S): at 17:46

## 2024-11-12 RX ADMIN — POTASSIUM PHOSPHATE, MONOBASIC POTASSIUM PHOSPHATE, DIBASIC INJECTION, 83.33 MILLIMOLE(S): 236; 224 SOLUTION, CONCENTRATE INTRAVENOUS at 18:48

## 2024-11-12 RX ADMIN — SODIUM CHLORIDE 1000 MILLILITER(S): 9 INJECTION, SOLUTION INTRAMUSCULAR; INTRAVENOUS; SUBCUTANEOUS at 10:43

## 2024-11-12 RX ADMIN — HYDROMORPHONE HYDROCHLORIDE 0.5 MILLIGRAM(S): 2 TABLET ORAL at 16:25

## 2024-11-12 RX ADMIN — HYDROMORPHONE HYDROCHLORIDE 0.5 MILLIGRAM(S): 2 TABLET ORAL at 10:25

## 2024-11-12 NOTE — PROGRESS NOTE ADULT - ATTENDING COMMENTS
Patient seen and examined at bedside this morning  He reports mostly right sided and suprapubic abdominal pain that has not improved compared to yesterday. Patient seem more uncomfortable today  Abdomen is soft, nondistended, focal RLQ and suprapubic tenderness  A/P: Repeat CT AP, keep NPO, continue IV fluids and antibiotics, trend WBC, I discussed with the patient this morning that he is not improving with conservative measures and will most likely need operative intervention today. We discussed laparoscopic sigmoid resection, creation of end colostomy, possible open. The risks were also discussed. Patient understood our discussion and will proceed with surgery. Follow up CT, pre-op for the OR.     Vital Signs Last 24 Hrs  T(C): 37.1 (11 Nov 2024 08:39), Max: 37.6 (10 Nov 2024 20:19)  T(F): 98.8 (11 Nov 2024 08:39), Max: 99.7 (10 Nov 2024 20:19)  HR: 74 (11 Nov 2024 08:39) (74 - 95)  BP: 145/92 (11 Nov 2024 08:39) (136/76 - 145/92)  BP(mean): --  RR: 18 (10 Nov 2024 20:19) (18 - 18)  SpO2: 96% (11 Nov 2024 08:39) (94% - 97%)    Parameters below as of 11 Nov 2024 08:39  Patient On (Oxygen Delivery Method): room air                          14.3   18.39 )-----------( 286      ( 10 Nov 2024 09:17 )             42.8 Patient seen and examined. He is POD 1 from a Everett's procedure for diverticulitis. Doing well. Pain well controlled. On exam, he is in no distress and abdomen is soft, appropriately tender. NG with scant but bilious output. Incision with prevena. ANTONELLA drain with SS output. Colostomy healthy without output. Will continue NG and flush to ensure functioning. Continue IV abx and fluids. Ambulate and IS. WBC is down trending. Cr 1.4, will continue fluids

## 2024-11-12 NOTE — ANESTHESIA FOLLOW-UP NOTE - NSEVALATIONFT_GEN_ALL_CORE
Vital Signs Last 24 Hrs  T(C): 36.8 (12 Nov 2024 07:19), Max: 37.6 (12 Nov 2024 00:02)  T(F): 98.3 (12 Nov 2024 07:19), Max: 99.7 (12 Nov 2024 00:02)  HR: 80 (12 Nov 2024 07:19) (71 - 82)  BP: 143/76 (12 Nov 2024 07:19) (117/78 - 149/95)  BP(mean): 87 (11 Nov 2024 21:00) (87 - 87)  RR: 18 (12 Nov 2024 07:19) (13 - 18)  SpO2: 97% (12 Nov 2024 07:19) (94% - 98%)    Parameters below as of 12 Nov 2024 07:19  Patient On (Oxygen Delivery Method): nasal cannula

## 2024-11-12 NOTE — DISCHARGE NOTE NURSING/CASE MANAGEMENT/SOCIAL WORK - PATIENT PORTAL LINK FT
You can access the FollowMyHealth Patient Portal offered by [facility] by registering at the following website: [email or web address]. By joining [facility]’s FollowMyHealth portal, you will also be able to view your health information using other applications (apps) compatible with our system.

## 2024-11-12 NOTE — PROGRESS NOTE ADULT - ASSESSMENT
39 year old male with PMH of recent dx of diverticulitis about four weeks ago, started on ABx as outpatient, but didn't complete the course,  presents with cc of abdominal pain for a day in lower abdomen,  mild nausea no emesis. Passing gas, Last BM yesterday   in ED hemodynamically stable, TTP on lower abd, WBC 17-> 18  CT w Acute sigmoid diverticulitis w tiny focus of adjacent free air and a small 1.0 cm region of loculated fluid , No organized drainable fluid collection.     Plan:  NPO  IV fluids  f/u Am labs  IV ABx: Zosyn   daily CBC  DVT ppx  Ambulate  pain control PRN  monitor fever  home meds  ostomy teaching  ROBF    Plan to be discussed with attending and updated

## 2024-11-12 NOTE — DISCHARGE NOTE NURSING/CASE MANAGEMENT/SOCIAL WORK - FINANCIAL ASSISTANCE
[facility] provides services at a reduced cost to those who are determined to be eligible through [facility]’s financial assistance program. Information regarding [facility]’s financial assistance program can be found by going to [email or web address] or by calling [phone].

## 2024-11-13 LAB
ALBUMIN SERPL ELPH-MCNC: 2.4 G/DL — LOW (ref 3.3–5)
ALP SERPL-CCNC: 39 U/L — LOW (ref 40–120)
ALT FLD-CCNC: 18 U/L — SIGNIFICANT CHANGE UP (ref 12–78)
ANION GAP SERPL CALC-SCNC: 2 MMOL/L — LOW (ref 5–17)
AST SERPL-CCNC: 12 U/L — LOW (ref 15–37)
BILIRUB SERPL-MCNC: 0.4 MG/DL — SIGNIFICANT CHANGE UP (ref 0.2–1.2)
BUN SERPL-MCNC: 12 MG/DL — SIGNIFICANT CHANGE UP (ref 7–23)
CALCIUM SERPL-MCNC: 8.1 MG/DL — LOW (ref 8.5–10.1)
CHLORIDE SERPL-SCNC: 116 MMOL/L — HIGH (ref 96–108)
CO2 SERPL-SCNC: 26 MMOL/L — SIGNIFICANT CHANGE UP (ref 22–31)
CREAT SERPL-MCNC: 0.95 MG/DL — SIGNIFICANT CHANGE UP (ref 0.5–1.3)
EGFR: 106 ML/MIN/1.73M2 — SIGNIFICANT CHANGE UP
GLUCOSE SERPL-MCNC: 113 MG/DL — HIGH (ref 70–99)
HCT VFR BLD CALC: 34.7 % — LOW (ref 39–50)
HGB BLD-MCNC: 11.5 G/DL — LOW (ref 13–17)
MAGNESIUM SERPL-MCNC: 2.6 MG/DL — SIGNIFICANT CHANGE UP (ref 1.6–2.6)
MCHC RBC-ENTMCNC: 29.4 PG — SIGNIFICANT CHANGE UP (ref 27–34)
MCHC RBC-ENTMCNC: 33.1 G/DL — SIGNIFICANT CHANGE UP (ref 32–36)
MCV RBC AUTO: 88.7 FL — SIGNIFICANT CHANGE UP (ref 80–100)
PHOSPHATE SERPL-MCNC: 1.2 MG/DL — LOW (ref 2.5–4.5)
PLATELET # BLD AUTO: 345 K/UL — SIGNIFICANT CHANGE UP (ref 150–400)
POTASSIUM SERPL-MCNC: 3.7 MMOL/L — SIGNIFICANT CHANGE UP (ref 3.5–5.3)
POTASSIUM SERPL-SCNC: 3.7 MMOL/L — SIGNIFICANT CHANGE UP (ref 3.5–5.3)
PROT SERPL-MCNC: 6.2 GM/DL — SIGNIFICANT CHANGE UP (ref 6–8.3)
RBC # BLD: 3.91 M/UL — LOW (ref 4.2–5.8)
RBC # FLD: 13.9 % — SIGNIFICANT CHANGE UP (ref 10.3–14.5)
SODIUM SERPL-SCNC: 144 MMOL/L — SIGNIFICANT CHANGE UP (ref 135–145)
WBC # BLD: 9.87 K/UL — SIGNIFICANT CHANGE UP (ref 3.8–10.5)
WBC # FLD AUTO: 9.87 K/UL — SIGNIFICANT CHANGE UP (ref 3.8–10.5)

## 2024-11-13 RX ORDER — POTASSIUM PHOSPHATE, MONOBASIC POTASSIUM PHOSPHATE, DIBASIC INJECTION, 236; 224 MG/ML; MG/ML
15 SOLUTION, CONCENTRATE INTRAVENOUS ONCE
Refills: 0 | Status: COMPLETED | OUTPATIENT
Start: 2024-11-13 | End: 2024-11-14

## 2024-11-13 RX ADMIN — HYDROMORPHONE HYDROCHLORIDE 0.5 MILLIGRAM(S): 2 TABLET ORAL at 21:33

## 2024-11-13 RX ADMIN — HYDROMORPHONE HYDROCHLORIDE 0.5 MILLIGRAM(S): 2 TABLET ORAL at 10:26

## 2024-11-13 RX ADMIN — PIPERACILLIN SODIUM AND TAZOBACTAM SODIUM 25 GRAM(S): 4; .5 INJECTION, POWDER, LYOPHILIZED, FOR SOLUTION INTRAVENOUS at 21:31

## 2024-11-13 RX ADMIN — Medication 5000 UNIT(S): at 14:20

## 2024-11-13 RX ADMIN — Medication 5000 UNIT(S): at 05:33

## 2024-11-13 RX ADMIN — PIPERACILLIN SODIUM AND TAZOBACTAM SODIUM 25 GRAM(S): 4; .5 INJECTION, POWDER, LYOPHILIZED, FOR SOLUTION INTRAVENOUS at 14:17

## 2024-11-13 RX ADMIN — HYDROMORPHONE HYDROCHLORIDE 0.5 MILLIGRAM(S): 2 TABLET ORAL at 22:33

## 2024-11-13 RX ADMIN — HYDROMORPHONE HYDROCHLORIDE 0.5 MILLIGRAM(S): 2 TABLET ORAL at 05:50

## 2024-11-13 RX ADMIN — HYDROMORPHONE HYDROCHLORIDE 0.5 MILLIGRAM(S): 2 TABLET ORAL at 00:42

## 2024-11-13 RX ADMIN — Medication 125 MILLILITER(S): at 14:20

## 2024-11-13 RX ADMIN — PIPERACILLIN SODIUM AND TAZOBACTAM SODIUM 25 GRAM(S): 4; .5 INJECTION, POWDER, LYOPHILIZED, FOR SOLUTION INTRAVENOUS at 05:33

## 2024-11-13 RX ADMIN — Medication 5000 UNIT(S): at 21:31

## 2024-11-13 RX ADMIN — HYDROMORPHONE HYDROCHLORIDE 0.5 MILLIGRAM(S): 2 TABLET ORAL at 17:08

## 2024-11-13 RX ADMIN — Medication 125 MILLILITER(S): at 13:43

## 2024-11-13 RX ADMIN — HYDROMORPHONE HYDROCHLORIDE 0.5 MILLIGRAM(S): 2 TABLET ORAL at 01:42

## 2024-11-13 RX ADMIN — PIPERACILLIN SODIUM AND TAZOBACTAM SODIUM 25 GRAM(S): 4; .5 INJECTION, POWDER, LYOPHILIZED, FOR SOLUTION INTRAVENOUS at 00:42

## 2024-11-13 NOTE — PROGRESS NOTE ADULT - ATTENDING COMMENTS
Patient seen and examined at bedside this morning  He is doing ok, reports pre-surgical pain has improved, he is now experiencing soreness at the incision  Abdomen is soft, distended, AT, ostomy pink, minimal gas and sweat  A/P: Keep NPO with NGT in place, continue IV fluids and IV antibiotics, patient should be up and out of bed ambulating, follow up stoma function, continue close supportive care during recovery    Vital Signs Last 24 Hrs  T(C): 36.9 (13 Nov 2024 07:16), Max: 37.4 (12 Nov 2024 16:00)  T(F): 98.5 (13 Nov 2024 07:16), Max: 99.3 (12 Nov 2024 16:00)  HR: 89 (13 Nov 2024 07:16) (89 - 97)  BP: 123/74 (13 Nov 2024 07:16) (123/74 - 136/79)  BP(mean): 91 (12 Nov 2024 16:00) (91 - 91)  RR: 18 (13 Nov 2024 07:16) (18 - 18)  SpO2: 93% (13 Nov 2024 07:16) (93% - 97%)    Parameters below as of 13 Nov 2024 07:16  Patient On (Oxygen Delivery Method): room air                          11.5   9.87  )-----------( 345      ( 13 Nov 2024 08:26 )             34.7

## 2024-11-13 NOTE — PROGRESS NOTE ADULT - ASSESSMENT
39 year old male w Acute sigmoid diverticulitis w tiny focus of adjacent free air and a small 1.0 cm region of loculated fluid , failed conservative mng,  npo POD2 [place]      Plan:  NPO, possible Ng tube clamp trial    IV fluids  f/u Am labs  monitor ostomy function   IV ABx: Zosyn   daily CBC  DVT ppx  Ambulate  pain control PRN  monitor fever  home meds  ostomy teaching      Plan to be discussed with attending and updated    39 year old male w Acute sigmoid diverticulitis w tiny focus of adjacent free air and a small 1.0 cm region of loculated fluid , failed conservative mng,  now POD2 [place]      Plan:  NPO, possible Ng tube clamp trial    IV fluids  f/u Am labs  monitor ostomy function   IV ABx: Zosyn   daily CBC  DVT ppx  Ambulate  pain control PRN  monitor fever  home meds  ostomy teaching      Plan to be discussed with attending and updated

## 2024-11-14 LAB
A PHAGOCYTOPH IGG TITR SER IF: SIGNIFICANT CHANGE UP
ANION GAP SERPL CALC-SCNC: 3 MMOL/L — LOW (ref 5–17)
B BURGDOR AB SER QL IA: 0.34 IV — SIGNIFICANT CHANGE UP
B MICROTI IGG TITR SER: SIGNIFICANT CHANGE UP
BUN SERPL-MCNC: 15 MG/DL — SIGNIFICANT CHANGE UP (ref 7–23)
CALCIUM SERPL-MCNC: 8.6 MG/DL — SIGNIFICANT CHANGE UP (ref 8.5–10.1)
CHLORIDE SERPL-SCNC: 118 MMOL/L — HIGH (ref 96–108)
CO2 SERPL-SCNC: 24 MMOL/L — SIGNIFICANT CHANGE UP (ref 22–31)
CREAT SERPL-MCNC: 0.96 MG/DL — SIGNIFICANT CHANGE UP (ref 0.5–1.3)
CULTURE RESULTS: SIGNIFICANT CHANGE UP
CULTURE RESULTS: SIGNIFICANT CHANGE UP
E CHAFFEENSIS IGG TITR SER IF: SIGNIFICANT CHANGE UP
EGFR: 104 ML/MIN/1.73M2 — SIGNIFICANT CHANGE UP
GLUCOSE BLDC GLUCOMTR-MCNC: 96 MG/DL — SIGNIFICANT CHANGE UP (ref 70–99)
GLUCOSE SERPL-MCNC: 127 MG/DL — HIGH (ref 70–99)
HCT VFR BLD CALC: 38.1 % — LOW (ref 39–50)
HGB BLD-MCNC: 12.5 G/DL — LOW (ref 13–17)
MAGNESIUM SERPL-MCNC: 2.6 MG/DL — SIGNIFICANT CHANGE UP (ref 1.6–2.6)
MCHC RBC-ENTMCNC: 29.3 PG — SIGNIFICANT CHANGE UP (ref 27–34)
MCHC RBC-ENTMCNC: 32.8 G/DL — SIGNIFICANT CHANGE UP (ref 32–36)
MCV RBC AUTO: 89.4 FL — SIGNIFICANT CHANGE UP (ref 80–100)
PHOSPHATE SERPL-MCNC: 2.1 MG/DL — LOW (ref 2.5–4.5)
PLATELET # BLD AUTO: 407 K/UL — HIGH (ref 150–400)
POTASSIUM SERPL-MCNC: 4.6 MMOL/L — SIGNIFICANT CHANGE UP (ref 3.5–5.3)
POTASSIUM SERPL-SCNC: 4.6 MMOL/L — SIGNIFICANT CHANGE UP (ref 3.5–5.3)
RBC # BLD: 4.26 M/UL — SIGNIFICANT CHANGE UP (ref 4.2–5.8)
RBC # FLD: 14 % — SIGNIFICANT CHANGE UP (ref 10.3–14.5)
SODIUM SERPL-SCNC: 145 MMOL/L — SIGNIFICANT CHANGE UP (ref 135–145)
SPECIMEN SOURCE: SIGNIFICANT CHANGE UP
SPECIMEN SOURCE: SIGNIFICANT CHANGE UP
WBC # BLD: 10.4 K/UL — SIGNIFICANT CHANGE UP (ref 3.8–10.5)
WBC # FLD AUTO: 10.4 K/UL — SIGNIFICANT CHANGE UP (ref 3.8–10.5)

## 2024-11-14 PROCEDURE — 74018 RADEX ABDOMEN 1 VIEW: CPT | Mod: 26

## 2024-11-14 RX ORDER — POTASSIUM PHOSPHATE, MONOBASIC POTASSIUM PHOSPHATE, DIBASIC INJECTION, 236; 224 MG/ML; MG/ML
15 SOLUTION, CONCENTRATE INTRAVENOUS ONCE
Refills: 0 | Status: COMPLETED | OUTPATIENT
Start: 2024-11-14 | End: 2024-11-15

## 2024-11-14 RX ORDER — SODIUM/POT/MAG/CALC/CHLOR/ACET 35-20-5MEQ
1 VIAL (ML) INTRAVENOUS
Refills: 0 | Status: DISCONTINUED | OUTPATIENT
Start: 2024-11-14 | End: 2024-11-14

## 2024-11-14 RX ORDER — SODIUM CHLORIDE 9 MG/ML
1000 INJECTION, SOLUTION INTRAMUSCULAR; INTRAVENOUS; SUBCUTANEOUS ONCE
Refills: 0 | Status: COMPLETED | OUTPATIENT
Start: 2024-11-14 | End: 2024-11-14

## 2024-11-14 RX ADMIN — PIPERACILLIN SODIUM AND TAZOBACTAM SODIUM 25 GRAM(S): 4; .5 INJECTION, POWDER, LYOPHILIZED, FOR SOLUTION INTRAVENOUS at 15:32

## 2024-11-14 RX ADMIN — HYDROMORPHONE HYDROCHLORIDE 0.5 MILLIGRAM(S): 2 TABLET ORAL at 07:57

## 2024-11-14 RX ADMIN — PIPERACILLIN SODIUM AND TAZOBACTAM SODIUM 25 GRAM(S): 4; .5 INJECTION, POWDER, LYOPHILIZED, FOR SOLUTION INTRAVENOUS at 05:35

## 2024-11-14 RX ADMIN — POTASSIUM PHOSPHATE, MONOBASIC POTASSIUM PHOSPHATE, DIBASIC INJECTION, 62.5 MILLIMOLE(S): 236; 224 SOLUTION, CONCENTRATE INTRAVENOUS at 17:37

## 2024-11-14 RX ADMIN — PIPERACILLIN SODIUM AND TAZOBACTAM SODIUM 25 GRAM(S): 4; .5 INJECTION, POWDER, LYOPHILIZED, FOR SOLUTION INTRAVENOUS at 22:15

## 2024-11-14 RX ADMIN — HYDROMORPHONE HYDROCHLORIDE 0.5 MILLIGRAM(S): 2 TABLET ORAL at 09:00

## 2024-11-14 RX ADMIN — HYDROMORPHONE HYDROCHLORIDE 0.5 MILLIGRAM(S): 2 TABLET ORAL at 17:46

## 2024-11-14 RX ADMIN — Medication 5000 UNIT(S): at 05:35

## 2024-11-14 RX ADMIN — SODIUM CHLORIDE 1000 MILLILITER(S): 9 INJECTION, SOLUTION INTRAMUSCULAR; INTRAVENOUS; SUBCUTANEOUS at 16:28

## 2024-11-14 RX ADMIN — HYDROMORPHONE HYDROCHLORIDE 0.5 MILLIGRAM(S): 2 TABLET ORAL at 18:46

## 2024-11-14 RX ADMIN — HYDROMORPHONE HYDROCHLORIDE 0.5 MILLIGRAM(S): 2 TABLET ORAL at 04:21

## 2024-11-14 RX ADMIN — Medication 5000 UNIT(S): at 22:15

## 2024-11-14 RX ADMIN — HYDROMORPHONE HYDROCHLORIDE 0.5 MILLIGRAM(S): 2 TABLET ORAL at 03:21

## 2024-11-14 RX ADMIN — Medication 5000 UNIT(S): at 15:36

## 2024-11-14 RX ADMIN — Medication 1 EACH: at 22:42

## 2024-11-14 NOTE — PROGRESS NOTE ADULT - ATTENDING COMMENTS
SE  NGT 1975 over 24 hrs, bilious.  AFVSS  NAD  colostomy quiet, soft, NT, distended  Labs reviewed  A/P -  Cont NPO, NGT, IVF.  Ambulate with NGT in place.  Colostomy teaching.  Eventual home care on discharge.

## 2024-11-14 NOTE — ADVANCED PRACTICE NURSE CONSULT - ASSESSMENT
Assessment:    Received patient sitting up in bed, awake, and made aware of purpose of CWON, agreeable to pouch change & ostomy teaching. End Colostomy  w/ Daniele drainable pouching system applied  in place, barrier intact, no leakage. Pouch gently removed from pt's abdomen w/ water moistened gauze.     Type of ostomy: End Colostomy   Location: LLQ   Size: oval in shape, unable to be rounded out, measuring 1 1/4" x 1 5/8" protruding 1/8"  Mucosa: red, moist, edematous & slightly translucent   Peristomal skin: Intact    Mucocutaneous junction: intact  Abdominal contours: Rounded and distended   Output: ~20cc serosanguinous output in removed in pouch  Midline/lap sites/ drains: Right LQ ANTONELLA with serous fluid. Midline with Pervena intact (i was able to keep the pervena intact while removing the wafer and pouch system .   NG Tube in place on continuous. Patient NPO     Patient re-pouched w/ Daniele 2 1/4” CeraPlus flat barrier (cut to 1 1/4" x 1 3/4" ), and [place]  2 1/4” drainable pouch w/ lock & roll closure.  Full ostomy lesson provided to patient.    Impression:  End Colostomy given pt's stomal characteristics and abdominal topography, pt still requires flat pouching system to protect peristomal skin w/ shrinking post-op stomal size    Patient more ready & willing to learn ostomy care this visit, observed entire pouch change, looking directly at stoma, asking  appropriate questions & able to verbalize key ostomy points. Patient again observed pouch opening/closing, again reviewed w/ patient that his intitial stool consistency & frequency will require him to empty pouch several times each day & again strongly encouraged patient start practicing pouch opening/closing & emptying as this skill is required prior to d/c home. I did educated that when he starts to each more solid food, his stool with become firmer and can start to see a pattern with output. Patient agreeable to start practicing pouch emptying. Patient expressed feeling overwhelmed with everything,  much emotional support & encouragement provided to patient.     Reviewed with patient dietary restrictions, s/s & prevention of food obstruction & constipation. Also reviewed w/ patient lifestyle modifications (clothing, bathing/showering, physical activity). Written information regarding all above topics provided to patient.        Patient enrolled in [employer] and will require a 2 1/4" flat wafer and pouch system. Put in recommendation for closed pouching system when stool thickness.     Ostomy Doll given to patient to have a discussion with his young children. The doll has a stoma and pouching system.

## 2024-11-14 NOTE — PROGRESS NOTE ADULT - ASSESSMENT
39 year old male w Acute sigmoid diverticulitis w tiny focus of adjacent free air and a small 1.0 cm region of loculated fluid , failed conservative mng,  now POD3 Catalina      Plan:  NPO, possible Ng tube clamp trial    IV fluids  f/u Am labs  monitor ostomy function   IV ABx: Zosyn   daily CBC  DVT ppx  Ambulate  pain control PRN  monitor fever  home meds  ostomy teaching      Plan to be discussed with attending and updated

## 2024-11-14 NOTE — CHART NOTE - NSCHARTNOTEFT_GEN_A_CORE
Clinical Nutrition PN Follow Up Note    * 39 year old male with PMH of recent dx of diverticulitis about four weeks ago, started on ABx as outpatient, but didn't complete the course, presents with cc of abdominal pain for a day in lower abdomen, mild nausea w/o emesis. Passing gas, Last BM yesterday (11/8/24). CT w/ Acute sigmoid diverticulitis w/ tiny focus of adjacent free air and a small 1.0 cm region of loculated fluid. No organized drainable fluid collection. Colorectal surgery following, pending ?possible surgery w/ possible colostomy creation; pending repeat CT scan.     *current status: pt has failed conservative management, now POD3 Catalina's procedure. NGT in place w/ bilious output, almost 2L overnight. Has been NPO x 6 days, meeting <50% ENN. Possible NGT clamp trial today per surgery. Now with ileus as per surgery, plan to initiate PPN today 11/14. Ostomy not functioning. Pt still w/o muscle/ fat wasting. Walking in hallway upon RD visit. Wound/ostomy RN consulted. Pt is at HIGH RISK for refeeding syndrome and PCM    *new pertinent meds: Tylenol, dextrose 5% + NaCl 0.9%, abx, morphine; KCl 10 mEq ordered but not given, plan for repletion of 15 mmol KPhos today    *labs reviewed; Na at high-end of normal.  K WNL today but was low/ low-normal previously.  Mg WNL but at high-end of normal.  Hypophosphatemia noted.  Will start lytes at standard amounts and adjust based on labs prn  No POCT noted.   Pending triglyceride, if <400 can add lipids    11-14    145  |  118[H]  |  15  ----------------------------<  127[H]  4.6   |  24  |  0.96    Ca    8.6      14 Nov 2024 09:36  Phos  2.1     11-14  Mg     2.6     11-14    TPro  6.2  /  Alb  2.4[L]  /  TBili  0.4  /  DBili  x   /  AST  12[L]  /  ALT  18  /  AlkPhos  39[L]  11-13    BMI: BMI (kg/m2): 37.6 (11-09-24 @ 03:15)  Glucose: POCT Blood Glucose.: 134 mg/dL (11-11-24 @ 19:16)    BP: 156/99 (11-14-24 @ 08:00) (117/78 - 156/99)Vital Signs Last 24 Hrs  T(C): 37 (11-14-24 @ 08:00), Max: 37.2 (11-14-24 @ 04:18)  T(F): 98.6 (11-14-24 @ 08:00), Max: 98.9 (11-14-24 @ 04:18)  HR: 74 (11-14-24 @ 08:00) (74 - 87)  BP: 156/99 (11-14-24 @ 08:00) (132/85 - 156/99)  BP(mean): --  RR: 18 (11-14-24 @ 08:00) (18 - 18)  SpO2: 97% (11-14-24 @ 08:00) (94% - 97%)    Lipid Panel: pending    * No POCT noted x 24 hrs, as per PN protocol, POCT required q6 hours to monitor glycemic control; should be maintained between 140- 180 mg/dL     *I&O's Detail    13 Nov 2024 07:01  -  14 Nov 2024 07:00  --------------------------------------------------------  IN:  Total IN: 0 mL    OUT:    Bulb (mL): 575 mL    Colostomy (mL): 0 mL    Nasogastric/Oral tube (mL): 1950 mL    Voided (mL): 800 mL  Total OUT: 3325 mL    Total NET: -3325 mL      14 Nov 2024 07:01  -  14 Nov 2024 10:20  --------------------------------------------------------  IN:  Total IN: 0 mL    OUT:    Bulb (mL): 40 mL  Total OUT: 40 mL    Total NET: -40 mL  * fluid status: negative fluid balance; ostomy not functioning. Lg NGT output noted. On IVF, not doc'd. Will monitor/adjust total PPN volume prn to maintain hydration  *no BM (+) doc'd - new ostomy.  pt not on bowel regimen.  *edema: 1+ generalized edema     *malnutrition: Does not meet criteria at this time; remains at HIGH RISK    Estimated Needs: based on 113.3 Kg (wt from RD bed scale 11/11)  Calories: 2040- 2500 Kcal (18- 22 Kcal/Kg)  Protein: 136- 159 g (1.2- 1.4 g/Kg)  Fluids: 2040- 2500 mL (18- 22 mL/Kg)    *Diet, NPO:   With Chewing Gum  With Hard Candy (11-13-24 @ 12:42) [Active]    Weight History:  Height (cm): 177.8 (11-09-24 @ 03:15)  Weight (kg): 118.8 (11-09-24 @ 03:15)  BMI (kg/m2): 37.6 (11-09-24 @ 03:15)  BSA (m2): 2.34 (11-09-24 @ 03:15)    PPN Recommendations: via peripheral venous line  Total Volume: 2200 mL x 24 hours  90 g  Amino Acids  100 g Dextrose  0 g Lipids 20% (pending TG level)  90 mEq Sodium Chloride  52 mEq Sodium Acetate  20 mmol Sodium Phosphate  50 mEq Potassium Chloride  0 mEq Potassium Acetate  0 mmol Potassium Phosphate  0 mEq Calcium Gluconate (CAPS out of PN solution)  5 mEq Magnesium Sulfate  200 mg Thiamine  1 ml Trace Elements  10 ml MVI    Total Calories      700      (Meets    32%  of  Estimated Energy needs  and    62%  Protein needs)   (osmolarity <900)    Additional Recommendations:    1) Daily weights  2) Strict I & O's  3) Daily lyte checks including magnesium and phos  4) Weekly triglycerides/LFT checks  5) POCT q6hrs; maintain 140-180mg/dL  6) if on PN > 6 days, consider placing PICC line to meet 100% of nutr needs    *will continue to monitor and adjust PN prn*  Sonali Jones, MS, RDN, CNSC, [phone]  Certified Nutrition

## 2024-11-14 NOTE — ADVANCED PRACTICE NURSE CONSULT - RECOMMEDATIONS
Pouch change: 	  -Gently remove pouch water moistened gauze   -Cleanse stoma site with water moistened gauze, gently pat dry  -Measure stoma, currently 1 1/4" x 1 5/8 "  -Trace and cut current size on Daniele 2 1/4” CeraPlus flat barrier   -"No Sting Barrier" to peristomal skin   -Apply barrier to patient's skin  -Attach Daniele 2 1/4” drainable lock and roll pouch onto barrier  Empty pouch when 1/3 to no more than 1/2 full of effluent/flatus. Change pouching system q 3 - 4 days and prn for leaking. Next pouch change- 11/17

## 2024-11-15 LAB
GLUCOSE BLDC GLUCOMTR-MCNC: 98 MG/DL — SIGNIFICANT CHANGE UP (ref 70–99)
HCT VFR BLD CALC: 37.7 % — LOW (ref 39–50)
HGB BLD-MCNC: 12.6 G/DL — LOW (ref 13–17)
MCHC RBC-ENTMCNC: 29.6 PG — SIGNIFICANT CHANGE UP (ref 27–34)
MCHC RBC-ENTMCNC: 33.4 G/DL — SIGNIFICANT CHANGE UP (ref 32–36)
MCV RBC AUTO: 88.7 FL — SIGNIFICANT CHANGE UP (ref 80–100)
PLATELET # BLD AUTO: 426 K/UL — HIGH (ref 150–400)
RBC # BLD: 4.25 M/UL — SIGNIFICANT CHANGE UP (ref 4.2–5.8)
RBC # FLD: 13.8 % — SIGNIFICANT CHANGE UP (ref 10.3–14.5)
WBC # BLD: 9.77 K/UL — SIGNIFICANT CHANGE UP (ref 3.8–10.5)
WBC # FLD AUTO: 9.77 K/UL — SIGNIFICANT CHANGE UP (ref 3.8–10.5)

## 2024-11-15 PROCEDURE — 71045 X-RAY EXAM CHEST 1 VIEW: CPT | Mod: 26

## 2024-11-15 RX ORDER — SODIUM/POT/MAG/CALC/CHLOR/ACET 35-20-5MEQ
1 VIAL (ML) INTRAVENOUS
Refills: 0 | Status: DISCONTINUED | OUTPATIENT
Start: 2024-11-15 | End: 2024-11-15

## 2024-11-15 RX ORDER — ACETAMINOPHEN 500MG 500 MG/1
1000 TABLET, COATED ORAL ONCE
Refills: 0 | Status: COMPLETED | OUTPATIENT
Start: 2024-11-15 | End: 2024-11-15

## 2024-11-15 RX ORDER — HYDROMORPHONE HYDROCHLORIDE 2 MG/1
0.5 TABLET ORAL EVERY 4 HOURS
Refills: 0 | Status: DISCONTINUED | OUTPATIENT
Start: 2024-11-17 | End: 2024-11-20

## 2024-11-15 RX ADMIN — ACETAMINOPHEN 500MG 400 MILLIGRAM(S): 500 TABLET, COATED ORAL at 03:55

## 2024-11-15 RX ADMIN — ACETAMINOPHEN 500MG 400 MILLIGRAM(S): 500 TABLET, COATED ORAL at 21:08

## 2024-11-15 RX ADMIN — Medication 5000 UNIT(S): at 05:28

## 2024-11-15 RX ADMIN — ACETAMINOPHEN 500MG 1000 MILLIGRAM(S): 500 TABLET, COATED ORAL at 21:08

## 2024-11-15 RX ADMIN — Medication 5000 UNIT(S): at 21:08

## 2024-11-15 RX ADMIN — PIPERACILLIN SODIUM AND TAZOBACTAM SODIUM 25 GRAM(S): 4; .5 INJECTION, POWDER, LYOPHILIZED, FOR SOLUTION INTRAVENOUS at 22:19

## 2024-11-15 RX ADMIN — POTASSIUM PHOSPHATE, MONOBASIC POTASSIUM PHOSPHATE, DIBASIC INJECTION, 62.5 MILLIMOLE(S): 236; 224 SOLUTION, CONCENTRATE INTRAVENOUS at 03:19

## 2024-11-15 RX ADMIN — PIPERACILLIN SODIUM AND TAZOBACTAM SODIUM 25 GRAM(S): 4; .5 INJECTION, POWDER, LYOPHILIZED, FOR SOLUTION INTRAVENOUS at 14:46

## 2024-11-15 RX ADMIN — PIPERACILLIN SODIUM AND TAZOBACTAM SODIUM 25 GRAM(S): 4; .5 INJECTION, POWDER, LYOPHILIZED, FOR SOLUTION INTRAVENOUS at 07:54

## 2024-11-15 RX ADMIN — Medication 5000 UNIT(S): at 15:24

## 2024-11-15 RX ADMIN — Medication 1 EACH: at 22:50

## 2024-11-15 NOTE — PROGRESS NOTE ADULT - ATTENDING COMMENTS
Patient seen and examined this morning.  He is postop day 4 from a Everett's procedure for perforated sigmoid diverticulitis.  He is starting to have bowel function from his colostomy but his NG output is still high at 1700 over the past 24 hours.  An abdominal x-ray today confirmed that the NG is in the stomach and not postpyloric.  On exam, he is in no distress.  His abdomen is soft, improving distention and appropriately tender.  Ostomy is healthy with some stool output.  Midline incision has Prevena dressing.  Will continue IV antibiotics, NG decompression and bowel rest, ambulation and I-S.  Hopefully with his return of bowel function, we will be able to remove the NG tube in the next 24 hours.  On TPN.

## 2024-11-15 NOTE — PROGRESS NOTE ADULT - ASSESSMENT
40 yo M now POD 4 s/p Catalina for sigmoid diverticulitis, with gradual return of bowel function.    Plan  -NGT can be pulled back 10 cm based on 11/14 AXR  -F/u colostomy output  -Consider clamp trial if NGT output responds to pulling it back  -OOB/ambulate/pulmonary toilet  -VTE ppx w/ SCDs & Lovenox  -On PPN  -Replace NGT output 0.5:1    Will d/w CRS team & update accordingly

## 2024-11-15 NOTE — CHART NOTE - NSCHARTNOTEFT_GEN_A_CORE
Clinical Nutrition PN Follow Up Note    *39 year old male with PMH of recent dx of diverticulitis about four weeks ago, started on ABx as outpatient, but didn't complete the course, presents with cc of abdominal pain for a day in lower abdomen, mild nausea w/o emesis. Passing gas, Last BM yesterday (11/8/24). CT w/ Acute sigmoid diverticulitis w/ tiny focus of adjacent free air and a small 1.0 cm region of loculated fluid. No organized drainable fluid collection. Colorectal surgery following, pending ?possible surgery w/ possible colostomy creation; pending repeat CT scan.   *11/14: PPN Day #1: pt has failed conservative management, now POD3 Catalina's procedure. NGT in place w/ bilious output, almost 2L overnight. Has been NPO x 6 days, meeting <50% ENN. Possible NGT clamp trial today per surgery. Now with ileus as per surgery, plan to initiate PPN today 11/14. Ostomy not functioning. Pt still w/o muscle/ fat wasting. Walking in hallway upon RD visit. Wound/ostomy RN consulted. Pt is at HIGH RISK for refeeding syndrome and PCM    PPN initiated 2/2 ileus, prolonged NPO status    *current status: PPN Day #2: seen by WOC RN, pt educated on care of ostomy and dietary restrictions. NGT noted with ~2L of output overnight. NGT can be pulled back 10 cm based on 11/14 AXR. Clamp trial considered if NGT output responds to pulling it back per surgery - replace NGT output 0.5:1.  F/U colostomy output (~170 mL). pt is still NPO, started chewing gum yesterday. D/w surgery with continue with PPN today. Recommend to advance to low fiber diet as soon as medically feasible to optimize nutritional status. Please see additional recommendations below.     *new pertinent meds: acetaminophen, abx in 5% dex, dialudid, KPhos 30 mmol in 0.9% NaCl x 24 hours, NaCl 0.9%    *labs reviewed; Na, K WNL, will keep total the same in the PN bag today. Mg WNL but at the high end of normal, will keep the same in the bag today. Phos at high end of normal limit, will decrease in the PN bag today. Of note repleted with 30 mmol KPhos x 24 hours. Continue to replete lytes outside of PN bag prn. T bili WNL will c/w trace elements in the PN bag today. corrected Ca WNL, rec'd add 10mEq of Calcium Gluconate outside of PN bag as CAPS is out. Will c/w thiamine and MVI with minerals in the PN bag today. Pending TG level <400; will add 100g lipids daily. Able to increase AA in PN bag today to better meet protein needs. Also noted, Cl elevated - more acetate vs Cl in PN bag (166 vs 140, respectively).    11-15    143  |  116[H]  |  20  ----------------------------<  108[H]  4.5   |  25  |  0.90    Ca    8.4[L]      15 Nov 2024 05:05  Phos  4.5     11-15  Mg     2.5     11-15    TPro  6.4  /  Alb  2.5[L]  /  TBili  0.6  /  DBili  x   /  AST  79[H]  /  ALT  149[H]  /  AlkPhos  96  11-15    BMI: BMI (kg/m2): 37.6 (11-09-24 @ 03:15)  Glucose: POCT Blood Glucose.: 98 mg/dL (11-15-24 @ 06:30)    BP: 133/88 (11-15-24 @ 08:29) (123/74 - 156/99)Vital Signs Last 24 Hrs  T(C): 37.1 (11-15-24 @ 08:29), Max: 37.4 (11-14-24 @ 16:00)  T(F): 98.8 (11-15-24 @ 08:29), Max: 99.3 (11-14-24 @ 16:00)  HR: 58 (11-15-24 @ 08:29) (58 - 72)  BP: 133/88 (11-15-24 @ 08:29) (132/82 - 140/80)  BP(mean): 100 (11-15-24 @ 08:29) (100 - 100)  RR: 18 (11-15-24 @ 08:29) (18 - 18)  SpO2: 99% (11-15-24 @ 08:29) (96% - 99%)    Lipid Panel:   POCT Blood Glucose.: 98 mg/dL (11-15-24 @ 06:30)  POCT Blood Glucose.: 96 mg/dL (11-14-24 @ 22:34)    *I&O's Detail    14 Nov 2024 07:01  -  15 Nov 2024 07:00  --------------------------------------------------------  IN:  Total IN: 0 mL    OUT:    Bulb (mL): 300 mL    Colostomy (mL): 65 mL    Nasogastric/Oral tube (mL): 1770 mL    Voided (mL): 470 mL  Total OUT: 2605 mL    Total NET: -2605 mL      15 Nov 2024 07:01  -  15 Nov 2024 09:23  --------------------------------------------------------  IN:    PPN (Peripheral Parenteral Nutrition): 184 mL  Total IN: 184 mL    OUT:    Colostomy (mL): 110 mL    Voided (mL): 300 mL  Total OUT: 410 mL    Total NET: -226 mL    * fluid status: negative; however input not fully doc'd. NGT with ~2L output overnight. Noted: ~170 mL output in ostomy.   *No BM doc'd - colostomy in place, per I&Os ~170 mL in ostomy .  pt not on bowel regimen.  *edema: 1+ generalized - doc'd 11/13    *malnutrition: Does not meet criteria at this time; remains at HIGH RISK    Estimated Needs: based on 113.3 Kg (wt from RD bed scale 11/11)  Calories: 2040- 2500 Kcal (18- 22 Kcal/Kg)  Protein: 136- 159 g (1.2- 1.4 g/Kg)  Fluids: 2040- 2500 mL (18- 22 mL/Kg)    Diet, NPO (11-14-24 @ 12:30) [Active]  Parenteral Nutrition - Adult 1 Each (92 mL/Hr) TPN Continuous <Continuous>, 11-14-24 @ 22:00, 22:00, Stop order after: 1 Days    Weight History:  Height (cm): 177.8 (11-09-24 @ 03:15)  Weight (kg): 118.8 (11-09-24 @ 03:15)  BMI (kg/m2): 37.6 (11-09-24 @ 03:15)  BSA (m2): 2.34 (11-09-24 @ 03:15)    PPN Recommendations: via peripheral venous line  Total Volume: 2200 mL  x 24 hours  100 g  Amino Acids  100 g Dextrose  0 g Lipids 20% pending level  100 mEq Sodium Chloride  56 mEq Sodium Acetate  10 mmol Sodium Phosphate  40 mEq Potassium Chloride  10 mEq Potassium Acetate  0 mmol Potassium Phosphate  0 mEq Calcium Gluconate (CAPS out of PN solution)   5 mEq Magnesium Sulfate  200 mg Thiamine  1 ml Trace Elements  10 ml MVI    Total Calories  740     (Meets  34  %  of  Estimated Energy needs  and   68 %  Protein needs)   (osmolarity <900)    Additional Recommendations:    1) Daily weights  2) Strict I & O's  3) Daily lyte checks including magnesium and phos  4) Weekly triglycerides/LFT checks  5) POCT q6hrs; maintain 140-180mg/dL  6) if on PN > 6 days, consider placing PICC line to meet 100% of nutr needs  7) Advance to a low fiber diet as soon as medically feasible to optimize nutritional status.     *will continue to monitor and adjust PN prn* Clinical Nutrition PN Follow Up Note    *39 year old male with PMH of recent dx of diverticulitis about four weeks ago, started on ABx as outpatient, but didn't complete the course, presents with cc of abdominal pain for a day in lower abdomen, mild nausea w/o emesis. Passing gas, Last BM yesterday (11/8/24). CT w/ Acute sigmoid diverticulitis w/ tiny focus of adjacent free air and a small 1.0 cm region of loculated fluid. No organized drainable fluid collection. Colorectal surgery following, pending ?possible surgery w/ possible colostomy creation; pending repeat CT scan.   *11/14: PPN Day #1: pt has failed conservative management, now POD3 Catalina's procedure. NGT in place w/ bilious output, almost 2L overnight. Has been NPO x 6 days, meeting <50% ENN. Possible NGT clamp trial today per surgery. Now with ileus as per surgery, plan to initiate PPN today 11/14. Ostomy not functioning. Pt still w/o muscle/ fat wasting. Walking in hallway upon RD visit. Wound/ostomy RN consulted. Pt is at HIGH RISK for refeeding syndrome and PCM    PPN initiated 2/2 ileus, prolonged NPO status    *current status: PPN Day #2: seen by CWON, was educated on care of ostomy and dietary restrictions. NGT noted with ~2L of output overnight. Clamp trial considered if NGT output responds to pulling back 10 cm (based on AXR on 11/14) per surgery.  F/U colostomy output (~170 mL). Remains NPO, started chewing gum yesterday to promote ostomy function. D/w surgery will continue with PPN today. Recommend to advance to low fiber diet as soon as medically feasible to optimize nutritional status.     *new pertinent meds: acetaminophen, abx in 5% dex, Dilaudid, KPhos 30 mmol in 0.9% NaCl x 24 hours, NaCl 0.9%    *labs reviewed; Na and K WNL, will keep total the same in the PN bag today. Mg WNL but at the high end of normal, will keep the same in the bag today and will adjust prn. Phos at high end of normal limit, will decrease in the PN bag today. Of note repleted with 30 mmol KPhos x 24 hours. Continue to replete lytes outside of PN bag prn. T bili WNL will c/w trace elements in the PN bag today. corrected Ca WNL, rec'd add 10mEq of Calcium Gluconate outside of PN bag as CAPS is out. Will c/w thiamine and MVI with minerals in the PN bag today. Pending TG level, if <400 will add 100g lipids daily. Able to increase AA in PN bag today to better meet protein needs. Also noted, Cl elevated - will add more acetate vs Cl in PN bag (166 vs 140, respectively).    11-15    143  |  116[H]  |  20  ----------------------------<  108[H]  4.5   |  25  |  0.90    Ca    8.4[L]      15 Nov 2024 05:05  Phos  4.5     11-15  Mg     2.5     11-15    TPro  6.4  /  Alb  2.5[L]  /  TBili  0.6  /  DBili  x   /  AST  79[H]  /  ALT  149[H]  /  AlkPhos  96  11-15    BMI: BMI (kg/m2): 37.6 (11-09-24 @ 03:15)  Glucose: POCT Blood Glucose.: 98 mg/dL (11-15-24 @ 06:30)    BP: 133/88 (11-15-24 @ 08:29) (123/74 - 156/99)Vital Signs Last 24 Hrs  T(C): 37.1 (11-15-24 @ 08:29), Max: 37.4 (11-14-24 @ 16:00)  T(F): 98.8 (11-15-24 @ 08:29), Max: 99.3 (11-14-24 @ 16:00)  HR: 58 (11-15-24 @ 08:29) (58 - 72)  BP: 133/88 (11-15-24 @ 08:29) (132/82 - 140/80)  BP(mean): 100 (11-15-24 @ 08:29) (100 - 100)  RR: 18 (11-15-24 @ 08:29) (18 - 18)  SpO2: 99% (11-15-24 @ 08:29) (96% - 99%)    Lipid Panel: pending TG level    POCT Blood Glucose.: 98 mg/dL (11-15-24 @ 06:30)  POCT Blood Glucose.: 96 mg/dL (11-14-24 @ 22:34)    *I&O's Detail    14 Nov 2024 07:01  -  15 Nov 2024 07:00  --------------------------------------------------------  IN:  Total IN: 0 mL    OUT:    Bulb (mL): 300 mL    Colostomy (mL): 65 mL    Nasogastric/Oral tube (mL): 1770 mL    Voided (mL): 470 mL  Total OUT: 2605 mL    Total NET: -2605 mL      15 Nov 2024 07:01  -  15 Nov 2024 09:23  --------------------------------------------------------  IN:    PPN (Peripheral Parenteral Nutrition): 184 mL  Total IN: 184 mL    OUT:    Colostomy (mL): 110 mL    Voided (mL): 300 mL  Total OUT: 410 mL    Total NET: -226 mL  * fluid status: negative; however input not fully doc'd. NGT with ~2L output overnight. Noted: ~170 mL output in ostomy.   *No BM doc'd - colostomy in place, per I&Os ~170 mL in ostomy .  pt not on bowel regimen.  *edema: 1+ generalized - doc'd 11/13    *malnutrition: Does not meet criteria at this time; remains at HIGH RISK    Estimated Needs: based on 113.3 Kg (wt from RD bed scale 11/11)  Calories: 2040- 2500 Kcal (18- 22 Kcal/Kg)  Protein: 136- 159 g (1.2- 1.4 g/Kg)  Fluids: 2040- 2500 mL (18- 22 mL/Kg)    Diet, NPO (11-14-24 @ 12:30) [Active]  Parenteral Nutrition - Adult 1 Each (92 mL/Hr) TPN Continuous <Continuous>, 11-14-24 @ 22:00, 22:00, Stop order after: 1 Days    Weight History:  Height (cm): 177.8 (11-09-24 @ 03:15)  Weight (kg): 118.8 (11-09-24 @ 03:15)  BMI (kg/m2): 37.6 (11-09-24 @ 03:15)  BSA (m2): 2.34 (11-09-24 @ 03:15)    PPN Recommendations: via peripheral venous line  Total Volume: 2200 mL  x 24 hours  100 g  Amino Acids  100 g Dextrose  0 g Lipids 20% (pending TG level)  100 mEq Sodium Chloride  56 mEq Sodium Acetate  10 mmol Sodium Phosphate  40 mEq Potassium Chloride  10 mEq Potassium Acetate  0 mmol Potassium Phosphate  0 mEq Calcium Gluconate (CAPS out of PN solution)   5 mEq Magnesium Sulfate  200 mg Thiamine  1 ml Trace Elements  10 ml MVI    Total Calories  740     (Meets  34 %  of  Estimated Energy needs  and   68%  Protein needs)   (osmolarity <900)    Additional Recommendations:    1) Daily weights  2) Strict I & O's  3) Daily lyte checks including magnesium and phos  4) Weekly triglycerides/LFT checks  5) POCT q6hrs; maintain 140-180mg/dL  6) if on PN > 6 days, consider placing PICC line to meet 100% of nutr needs  7) Advance to a low fiber diet as soon as medically feasible to optimize nutritional status.     *will continue to monitor and adjust PN prn*  Lisa Kohler RDN, CDN [phone] Clinical Nutrition PN Follow Up Note    *39 year old male with PMH of recent dx of diverticulitis about four weeks ago, started on ABx as outpatient, but didn't complete the course, presents with cc of abdominal pain for a day in lower abdomen, mild nausea w/o emesis. Passing gas, Last BM yesterday (11/8/24). CT w/ Acute sigmoid diverticulitis w/ tiny focus of adjacent free air and a small 1.0 cm region of loculated fluid. No organized drainable fluid collection. Colorectal surgery following, pending ?possible surgery w/ possible colostomy creation; pending repeat CT scan.   *11/14: PPN Day #1: pt has failed conservative management, now POD3 Catalina's procedure. NGT in place w/ bilious output, almost 2L overnight. Has been NPO x 6 days, meeting <50% ENN. Possible NGT clamp trial today per surgery. Now with ileus as per surgery, plan to initiate PPN today 11/14. Ostomy not functioning. Pt still w/o muscle/ fat wasting. Walking in hallway upon RD visit. Wound/ostomy RN consulted. Pt is at HIGH RISK for refeeding syndrome and PCM    PPN initiated 2/2 ileus, prolonged NPO status    *current status: PPN Day #2: seen by CWON, was educated on care of ostomy and dietary restrictions. NGT noted with ~2L of output overnight. Clamp trial considered if NGT output responds to pulling back 10 cm (based on AXR on 11/14) per surgery.  F/U colostomy output (~170 mL). Remains NPO, started chewing gum yesterday to promote ostomy function. D/w surgery will continue with PPN today. Recommend to advance to low fiber diet as soon as medically feasible to optimize nutritional status.     *new pertinent meds: acetaminophen, abx in 5% dex, Dilaudid, KPhos 30 mmol in 0.9% NaCl x 24 hours, NaCl 0.9%    *labs reviewed; Na and K WNL, will keep total the same in the PN bag today. Mg WNL but at the high end of normal, will keep the same in the bag today and will adjust prn. Phos at high end of normal limit, will decrease in the PN bag today. Of note repleted with 30 mmol KPhos x 24 hours. Continue to replete lytes outside of PN bag prn. T bili WNL will c/w trace elements in the PN bag today. corrected Ca WNL, rec'd add 10mEq of Calcium Gluconate outside of PN bag as CAPS is out. Will c/w thiamine and MVI with minerals in the PN bag today. Pending TG level, if <400 will add 100g lipids daily. Able to increase AA in PN bag today to better meet protein needs. Also noted, Cl elevated - will add more acetate vs Cl in PN bag (166 vs 140, respectively).    11-15    143  |  116[H]  |  20  ----------------------------<  108[H]  4.5   |  25  |  0.90    Ca    8.4[L]      15 Nov 2024 05:05  Phos  4.5     11-15  Mg     2.5     11-15    TPro  6.4  /  Alb  2.5[L]  /  TBili  0.6  /  DBili  x   /  AST  79[H]  /  ALT  149[H]  /  AlkPhos  96  11-15    BMI: BMI (kg/m2): 37.6 (11-09-24 @ 03:15)  Glucose: POCT Blood Glucose.: 98 mg/dL (11-15-24 @ 06:30)    BP: 133/88 (11-15-24 @ 08:29) (123/74 - 156/99)Vital Signs Last 24 Hrs  T(C): 37.1 (11-15-24 @ 08:29), Max: 37.4 (11-14-24 @ 16:00)  T(F): 98.8 (11-15-24 @ 08:29), Max: 99.3 (11-14-24 @ 16:00)  HR: 58 (11-15-24 @ 08:29) (58 - 72)  BP: 133/88 (11-15-24 @ 08:29) (132/82 - 140/80)  BP(mean): 100 (11-15-24 @ 08:29) (100 - 100)  RR: 18 (11-15-24 @ 08:29) (18 - 18)  SpO2: 99% (11-15-24 @ 08:29) (96% - 99%)    Lipid Panel: pending TG level    POCT Blood Glucose.: 98 mg/dL (11-15-24 @ 06:30)  POCT Blood Glucose.: 96 mg/dL (11-14-24 @ 22:34)    *I&O's Detail    14 Nov 2024 07:01  -  15 Nov 2024 07:00  --------------------------------------------------------  IN:  Total IN: 0 mL    OUT:    Bulb (mL): 300 mL    Colostomy (mL): 65 mL    Nasogastric/Oral tube (mL): 1770 mL    Voided (mL): 470 mL  Total OUT: 2605 mL    Total NET: -2605 mL      15 Nov 2024 07:01  -  15 Nov 2024 09:23  --------------------------------------------------------  IN:    PPN (Peripheral Parenteral Nutrition): 184 mL  Total IN: 184 mL    OUT:    Colostomy (mL): 110 mL    Voided (mL): 300 mL  Total OUT: 410 mL    Total NET: -226 mL  * fluid status: negative; however input not fully doc'd. NGT with ~2L output overnight. Noted: ~170 mL output in ostomy.   *No BM doc'd - colostomy in place, per I&Os ~170 mL in ostomy .  pt not on bowel regimen.  *edema: 1+ generalized - doc'd 11/13    *malnutrition: Does not meet criteria at this time; remains at HIGH RISK    Estimated Needs: based on 113.3 Kg (wt from RD bed scale 11/11)  Calories: 2040- 2500 Kcal (18- 22 Kcal/Kg)  Protein: 136- 159 g (1.2- 1.4 g/Kg)  Fluids: 2040- 2500 mL (18- 22 mL/Kg)    Diet, NPO (11-14-24 @ 12:30) [Active]  Parenteral Nutrition - Adult 1 Each (92 mL/Hr) TPN Continuous <Continuous>, 11-14-24 @ 22:00, 22:00, Stop order after: 1 Days    Weight History:  Height (cm): 177.8 (11-09-24 @ 03:15)  Weight (kg): 118.8 (11-09-24 @ 03:15)  BMI (kg/m2): 37.6 (11-09-24 @ 03:15)  BSA (m2): 2.34 (11-09-24 @ 03:15)    PPN Recommendations: via peripheral venous line  Total Volume: 2200 mL  x 24 hours  100 g  Amino Acids  100 g Dextrose  0 g Lipids 20% (pending TG level)  100 mEq Sodium Chloride  56 mEq Sodium Acetate  10 mmol Sodium Phosphate  40 mEq Potassium Chloride  10 mEq Potassium Acetate  0 mmol Potassium Phosphate  0 mEq Calcium Gluconate (CAPS out of PN solution)   5 mEq Magnesium Sulfate  200 mg Thiamine  1 ml Trace Elements  10 ml MVI    Total Calories  740     (Meets  34 %  of  Estimated Energy needs  and   68%  Protein needs)   (osmolarity <900)    Additional Recommendations:    1) Daily weights  2) Strict I & O's  3) Daily lyte checks including magnesium and phos  4) Weekly triglycerides/LFT checks  5) POCT q6hrs; maintain 140-180mg/dL  6) if on PN > 6 days, consider placing PICC line to meet 100% of nutr needs  7) Advance to a low fiber diet as soon as medically feasible to optimize nutritional status.     *will continue to monitor and adjust PN prn*  Ina Branch, PhD, MS, RDN   Lisa Kohler, RDN, CDN [phone]

## 2024-11-16 LAB
ALBUMIN SERPL ELPH-MCNC: 2.6 G/DL — LOW (ref 3.3–5)
ALP SERPL-CCNC: 104 U/L — SIGNIFICANT CHANGE UP (ref 40–120)
ALT FLD-CCNC: 190 U/L — HIGH (ref 12–78)
ANION GAP SERPL CALC-SCNC: 3 MMOL/L — LOW (ref 5–17)
AST SERPL-CCNC: 83 U/L — HIGH (ref 15–37)
BILIRUB SERPL-MCNC: 0.6 MG/DL — SIGNIFICANT CHANGE UP (ref 0.2–1.2)
BUN SERPL-MCNC: 26 MG/DL — HIGH (ref 7–23)
CALCIUM SERPL-MCNC: 8.6 MG/DL — SIGNIFICANT CHANGE UP (ref 8.5–10.1)
CHLORIDE SERPL-SCNC: 111 MMOL/L — HIGH (ref 96–108)
CO2 SERPL-SCNC: 28 MMOL/L — SIGNIFICANT CHANGE UP (ref 22–31)
CREAT SERPL-MCNC: 1.12 MG/DL — SIGNIFICANT CHANGE UP (ref 0.5–1.3)
CULTURE RESULTS: SIGNIFICANT CHANGE UP
EGFR: 87 ML/MIN/1.73M2 — SIGNIFICANT CHANGE UP
GLUCOSE BLDC GLUCOMTR-MCNC: 88 MG/DL — SIGNIFICANT CHANGE UP (ref 70–99)
GLUCOSE BLDC GLUCOMTR-MCNC: 97 MG/DL — SIGNIFICANT CHANGE UP (ref 70–99)
GLUCOSE BLDC GLUCOMTR-MCNC: 97 MG/DL — SIGNIFICANT CHANGE UP (ref 70–99)
GLUCOSE SERPL-MCNC: 110 MG/DL — HIGH (ref 70–99)
HCT VFR BLD CALC: 37.7 % — LOW (ref 39–50)
HGB BLD-MCNC: 12.7 G/DL — LOW (ref 13–17)
MAGNESIUM SERPL-MCNC: 2.6 MG/DL — SIGNIFICANT CHANGE UP (ref 1.6–2.6)
MCHC RBC-ENTMCNC: 29.6 PG — SIGNIFICANT CHANGE UP (ref 27–34)
MCHC RBC-ENTMCNC: 33.7 G/DL — SIGNIFICANT CHANGE UP (ref 32–36)
MCV RBC AUTO: 87.9 FL — SIGNIFICANT CHANGE UP (ref 80–100)
PHOSPHATE SERPL-MCNC: 3.9 MG/DL — SIGNIFICANT CHANGE UP (ref 2.5–4.5)
PLATELET # BLD AUTO: 477 K/UL — HIGH (ref 150–400)
POTASSIUM SERPL-MCNC: 3.8 MMOL/L — SIGNIFICANT CHANGE UP (ref 3.5–5.3)
POTASSIUM SERPL-SCNC: 3.8 MMOL/L — SIGNIFICANT CHANGE UP (ref 3.5–5.3)
PROT SERPL-MCNC: 6.7 GM/DL — SIGNIFICANT CHANGE UP (ref 6–8.3)
RBC # BLD: 4.29 M/UL — SIGNIFICANT CHANGE UP (ref 4.2–5.8)
RBC # FLD: 13.6 % — SIGNIFICANT CHANGE UP (ref 10.3–14.5)
SODIUM SERPL-SCNC: 142 MMOL/L — SIGNIFICANT CHANGE UP (ref 135–145)
SPECIMEN SOURCE: SIGNIFICANT CHANGE UP
WBC # BLD: 9.16 K/UL — SIGNIFICANT CHANGE UP (ref 3.8–10.5)
WBC # FLD AUTO: 9.16 K/UL — SIGNIFICANT CHANGE UP (ref 3.8–10.5)

## 2024-11-16 RX ORDER — FAT EMULSIONS 20 %
0.84 EMULSION INTRAVENOUS
Qty: 100 | Refills: 0 | Status: DISCONTINUED | OUTPATIENT
Start: 2024-11-16 | End: 2024-11-16

## 2024-11-16 RX ORDER — SODIUM/POT/MAG/CALC/CHLOR/ACET 35-20-5MEQ
1 VIAL (ML) INTRAVENOUS
Refills: 0 | Status: DISCONTINUED | OUTPATIENT
Start: 2024-11-16 | End: 2024-11-16

## 2024-11-16 RX ADMIN — Medication 1 EACH: at 23:31

## 2024-11-16 RX ADMIN — PIPERACILLIN SODIUM AND TAZOBACTAM SODIUM 25 GRAM(S): 4; .5 INJECTION, POWDER, LYOPHILIZED, FOR SOLUTION INTRAVENOUS at 05:22

## 2024-11-16 RX ADMIN — HYDROMORPHONE HYDROCHLORIDE 0.5 MILLIGRAM(S): 2 TABLET ORAL at 20:53

## 2024-11-16 RX ADMIN — Medication 5000 UNIT(S): at 22:26

## 2024-11-16 RX ADMIN — Medication 41.67 GM/KG/DAY: at 23:03

## 2024-11-16 RX ADMIN — Medication 5000 UNIT(S): at 06:07

## 2024-11-16 RX ADMIN — Medication 5000 UNIT(S): at 14:17

## 2024-11-16 RX ADMIN — HYDROMORPHONE HYDROCHLORIDE 0.5 MILLIGRAM(S): 2 TABLET ORAL at 21:23

## 2024-11-16 NOTE — PROGRESS NOTE ADULT - ATTENDING COMMENTS
SE  NGT 1850cc. Colostomy with stool 310.  AFVSS  NAD  soft, NTND  Labs reviewed  A/P -   Attempt clamp trial for NGT as NG has been in place now for 5 days.  If minimal output, likely d/c tomorrow and start clears.

## 2024-11-16 NOTE — PROGRESS NOTE ADULT - ASSESSMENT
40 yo M now POD 5 s/p Catalina for sigmoid diverticulitis  Ostomy is functioning       Plan  -possible Ng tube clamp trial   -F/u colostomy output  -OOB/ambulate/pulmonary toilet  -VTE ppx w/ SCDs & Lovenox  -On PPN  - osotmy teaching   - pain control     Will d/w CRS team & update accordingly

## 2024-11-16 NOTE — CHART NOTE - NSCHARTNOTEFT_GEN_A_CORE
Clinical Nutrition PN Follow Up Note    *39 year old male with PMH of recent dx of diverticulitis about four weeks ago, started on ABx as outpatient, but didn't complete the course, presents with cc of abdominal pain for a day in lower abdomen, mild nausea w/o emesis. Passing gas, Last BM yesterday (11/8/24). CT w/ Acute sigmoid diverticulitis w/ tiny focus of adjacent free air and a small 1.0 cm region of loculated fluid. No organized drainable fluid collection. Colorectal surgery following, pending ?possible surgery w/ possible colostomy creation; pending repeat CT scan.   *11/14: PPN Day #1: pt has failed conservative management, now POD3 Catalina's procedure. NGT in place w/ bilious output, almost 2L overnight. Has been NPO x 6 days, meeting <50% ENN. Possible NGT clamp trial today per surgery. Now with ileus as per surgery, plan to initiate PPN today 11/14. Ostomy not functioning. Pt still w/o muscle/ fat wasting. Walking in hallway upon RD visit. Wound/ostomy RN consulted. Pt is at HIGH RISK for refeeding syndrome and PCM  *11/15: PPN Day #2: seen by CWON, was educated on care of ostomy and dietary restrictions. NGT noted with ~2L of output overnight. Clamp trial considered if NGT output responds to pulling back 10 cm (based on AXR on 11/14) per surgery.  F/U colostomy output (~170 mL). Remains NPO, started chewing gum yesterday to promote ostomy function. D/w surgery will continue with PPN today. Recommend to advance to low fiber diet as soon as medically feasible to optimize nutritional status.     PPN initiated 2/2 ileus, prolonged NPO status    *current status: PPN Day #3: Remains NPO; POD5 from Catalina's procedure. NGT still with large bilious output (~2L) overnight. Per surgery ostomy functioning with ~500 mL output x 24 hours (dark brown liquid stool). Possible NG tube clamp trial today as per surgery. D/w surgery will continue PPN today. Pt remains at high risk for PCM - Recommend to advance to low fiber diet as soon as medically feasible to optimize nutritional needs.     *new pertinent meds: acetaminophen, abx in 5% dextrose, KPhos 15 mmol IVPB x 24 hours    *labs reviewed; Na, K, Phos WNL; will keep totals the same in the PN bag today. Mg remains on high end of normal limits; will remove Mg from PN bag today. Continue to replete lytes outside of PN bag prn. T bili WNL will c/w trace elements in the PN bag. corrected Ca WNL, rec'd add 10mEq of Calcium Gluconate outside of PN bag as CAPS is out. TG level WNL (243) will add 100g of lipids daily. Will c/w thiamine and MVI with minerals in the PN bag. Will increase total volume in PN bag today 2/2 consistent high output from NGT (~2L x 24 hours)/negative fluid balance. Noted: Cl elevated - will add more acetate vs Cl in PN bag (166 vs 150; respectively). Only one POCT noted x 24 hours (WNL) - as per PN protocol, POCT required q6 hours to monitor glycemic control; should be maintained between 140- 180 mg/dL.    11-16    142  |  111[H]  |  26[H]  ----------------------------<  110[H]  3.8   |  28  |  1.12    Ca    8.6      16 Nov 2024 04:20  Phos  3.9     11-16  Mg     2.6     11-16    TPro  6.7  /  Alb  2.6[L]  /  TBili  0.6  /  DBili  x   /  AST  83[H]  /  ALT  190[H]  /  AlkPhos  104  11-16      BMI: BMI (kg/m2): 37.6 (11-09-24 @ 03:15)  Glucose: POCT Blood Glucose.: 98 mg/dL (11-15-24 @ 06:30)    BP: 129/89 (11-15-24 @ 16:00) (123/74 - 156/99)Vital Signs Last 24 Hrs  T(C): 36.4 (11-15-24 @ 16:00), Max: 37.1 (11-15-24 @ 08:29)  T(F): 97.5 (11-15-24 @ 16:00), Max: 98.8 (11-15-24 @ 08:29)  HR: 72 (11-15-24 @ 16:00) (58 - 72)  BP: 129/89 (11-15-24 @ 16:00) (129/89 - 133/88)  BP(mean): 100 (11-15-24 @ 08:29) (100 - 100)  RR: 18 (11-15-24 @ 16:00) (18 - 18)  SpO2: 96% (11-15-24 @ 16:00) (96% - 99%)    Lipid Panel: Date/Time: 11-15-24 @ 05:05  Triglycerides, Serum: 243    *I&O's Detail    15 Nov 2024 07:01  -  16 Nov 2024 07:00  --------------------------------------------------------  IN:    PPN (Peripheral Parenteral Nutrition): 1104 mL  Total IN: 1104 mL    OUT:    Bulb (mL): 50 mL    Colostomy (mL): 510 mL    Nasogastric/Oral tube (mL): 1850 mL    Voided (mL): 950 mL  Total OUT: 3360 mL    Total NET: -2256 mL    * fluid status: negative; will increase total volume in PN bag today. NGT with continued high output (~2L). Noted: ~500mL output in ostomy.   *dark liquid stool doc'd in ostomy bag 11/15.  pt not on bowel regimen.  *edema: none noted.     *malnutrition: Does not meet criteria at this time; remains at HIGH RISK    Estimated Needs: based on 113.3 Kg (wt from RD bed scale 11/11)  Calories: 2040- 2500 Kcal (18- 22 Kcal/Kg)  Protein: 136- 159 g (1.2- 1.4 g/Kg)  Fluids: 2040- 2500 mL (18- 22 mL/Kg)    Diet, NPO (11-14-24 @ 12:30) [Active]  Parenteral Nutrition - Adult 1 Each (92 mL/Hr) TPN Continuous <Continuous>, 11-15-24 @ 22:00, 22:00, Stop order after: 1 Days    Weight History:  Height (cm): 177.8 (11-09-24 @ 03:15)  Weight (kg): 118.8 (11-09-24 @ 03:15)  BMI (kg/m2): 37.6 (11-09-24 @ 03:15)  BSA (m2): 2.34 (11-09-24 @ 03:15)    PPN Recommendations: via peripheral venous line  Total Volume: 2400 mL  x 24 hours  110 g  Amino Acids  100 g Dextrose  100 g Lipids 20%  100 mEq Sodium Chloride  56 mEq Sodium Acetate  10 mmol Sodium Phosphate  50 mEq Potassium Chloride  0 mEq Potassium Acetate  0 mmol Potassium Phosphate  0 mEq Calcium Gluconate (CAPS out of PN solution)   0 mEq Magnesium Sulfate  200 mg Thiamine  1 ml Trace Elements  10 ml MVI    Total Calories  1740     (Meets 78 %  of  Estimated Energy needs  and  76 %  Protein needs)   (osmolarity ~865)    Additional Recommendations:    1) Daily weights  2) Strict I & O's  3) Daily lyte checks including magnesium and phos  4) Weekly triglycerides/LFT checks  5) POCT q6hrs; maintain 140-180mg/dL  6) if on PN > 6 days, consider placing PICC line to meet 100% of nutr needs  7) Advance to a low fiber diet as soon as medically feasible to optimize nutritional status    *will continue to monitor and adjust PN prn*  Ina Branch, PhD, MS, RDN Clinical Nutrition PN Follow Up Note    *39 year old male with PMH of recent dx of diverticulitis about four weeks ago, started on ABx as outpatient, but didn't complete the course, presents with cc of abdominal pain for a day in lower abdomen, mild nausea w/o emesis. Passing gas, Last BM yesterday (11/8/24). CT w/ Acute sigmoid diverticulitis w/ tiny focus of adjacent free air and a small 1.0 cm region of loculated fluid. No organized drainable fluid collection. Colorectal surgery following, pending ?possible surgery w/ possible colostomy creation; pending repeat CT scan.   *11/14: PPN Day #1: pt has failed conservative management, now POD3 Catalina's procedure. NGT in place w/ bilious output, almost 2L overnight. Has been NPO x 6 days, meeting <50% ENN. Possible NGT clamp trial today per surgery. Now with ileus as per surgery, plan to initiate PPN today 11/14. Ostomy not functioning. Pt still w/o muscle/ fat wasting. Walking in hallway upon RD visit. Wound/ostomy RN consulted. Pt is at HIGH RISK for refeeding syndrome and PCM  *11/15: seen by [facility]ON, was educated on care of ostomy and dietary restrictions. NGT noted with ~2L of output overnight. Clamp trial considered if NGT output responds to pulling back 10 cm (based on AXR on 11/14) per surgery.  F/U colostomy output (~170 mL). Remains NPO, started chewing gum yesterday to promote ostomy function.    PPN initiated 2/2 ileus, prolonged NPO status    *current status: PPN Day #3: Remains NPO; POD5 from Catalina's procedure. NGT still with large bilious output (~2L) overnight. Per surgery ostomy functioning with ~500 mL output x 24 hours (dark brown liquid stool). Possible NG tube clamp trial today as per surgery. D/w surgery will continue PPN today. Pt remains at high risk for PCM    *new pertinent meds: acetaminophen, abx in 5% dextrose, KPhos 15 mmol IVPB x 24 hours    *labs reviewed; Na, K, Phos WNL; will keep totals the same in the PN bag today. Mg remains on high end of normal limits; will remove Mg from PN bag today. Continue to replete lytes outside of PN bag prn. May need to considering increasing K in bag tomorrow d/t downtrend if continues. T bili WNL will c/w trace elements in the PN bag. corrected Ca WNL, rec'd add 10mEq of Calcium Gluconate outside of PN bag as CAPS is out. TG level WNL (243) will add 100g of lipids daily. Will c/w thiamine and MVI with minerals in the PN bag. Will increase total volume in PN bag today to 2400 mL 2/2 consistent high output from NGT (~2L x 24 hours)/negative fluid balance. Noted: Cl elevated - will add more acetate vs Cl in PN bag (166 vs 150; respectively). Only one POCT noted x 24 hours (WNL) - as per PN protocol, POCT required q6 hours to monitor glycemic control; should be maintained between 140- 180 mg/dL.    11-16    142  |  111[H]  |  26[H]  ----------------------------<  110[H]  3.8   |  28  |  1.12    Ca    8.6      16 Nov 2024 04:20  Phos  3.9     11-16  Mg     2.6     11-16    TPro  6.7  /  Alb  2.6[L]  /  TBili  0.6  /  DBili  x   /  AST  83[H]  /  ALT  190[H]  /  AlkPhos  104  11-16    BMI: BMI (kg/m2): 37.6 (11-09-24 @ 03:15)  Glucose: POCT Blood Glucose.: 98 mg/dL (11-15-24 @ 06:30)    BP: 129/89 (11-15-24 @ 16:00) (123/74 - 156/99)Vital Signs Last 24 Hrs  T(C): 36.4 (11-15-24 @ 16:00), Max: 37.1 (11-15-24 @ 08:29)  T(F): 97.5 (11-15-24 @ 16:00), Max: 98.8 (11-15-24 @ 08:29)  HR: 72 (11-15-24 @ 16:00) (58 - 72)  BP: 129/89 (11-15-24 @ 16:00) (129/89 - 133/88)  BP(mean): 100 (11-15-24 @ 08:29) (100 - 100)  RR: 18 (11-15-24 @ 16:00) (18 - 18)  SpO2: 96% (11-15-24 @ 16:00) (96% - 99%)    Lipid Panel: Date/Time: 11-15-24 @ 05:05  Triglycerides, Serum: 243    *I&O's Detail    15 Nov 2024 07:01  -  16 Nov 2024 07:00  --------------------------------------------------------  IN:    PPN (Peripheral Parenteral Nutrition): 1104 mL  Total IN: 1104 mL    OUT:    Bulb (mL): 50 mL    Colostomy (mL): 510 mL    Nasogastric/Oral tube (mL): 1850 mL    Voided (mL): 950 mL  Total OUT: 3360 mL    Total NET: -2256 mL    * fluid status: negative; will increase total volume in PN bag today to 2400 mL. NGT with continued high output (~2L). Noted: ~500mL output in ostomy.   *dark liquid stool doc'd in ostomy bag 11/15.  pt not on bowel regimen.  *edema: none noted.     *malnutrition: Does not meet criteria at this time; remains at HIGH RISK    Estimated Needs: based on 113.3 Kg (wt from RD bed scale 11/11)  Calories: 2040- 2500 Kcal (18- 22 Kcal/Kg)  Protein: 136- 159 g (1.2- 1.4 g/Kg)  Fluids: 2040- 2500 mL (18- 22 mL/Kg)    Diet, NPO (11-14-24 @ 12:30) [Active]  Parenteral Nutrition - Adult 1 Each (92 mL/Hr) TPN Continuous <Continuous>, 11-15-24 @ 22:00, 22:00, Stop order after: 1 Days    Weight History:  Height (cm): 177.8 (11-09-24 @ 03:15)  Weight (kg): 118.8 (11-09-24 @ 03:15)  BMI (kg/m2): 37.6 (11-09-24 @ 03:15)  BSA (m2): 2.34 (11-09-24 @ 03:15)    PPN Recommendations: via peripheral venous line  Total Volume: 2400 mL  x 24 hours  110 g  Amino Acids  100 g Dextrose  100 g Lipids 20%  100 mEq Sodium Chloride  56 mEq Sodium Acetate  10 mmol Sodium Phosphate  50 mEq Potassium Chloride  0 mEq Potassium Acetate  0 mmol Potassium Phosphate  0 mEq Calcium Gluconate (CAPS out of PN solution)   0 mEq Magnesium Sulfate  200 mg Thiamine  1 ml Trace Elements  10 ml MVI    Total Calories  1740     (Meets 78 %  of  Estimated Energy needs  and  76 %  Protein needs)   (osmolarity ~865)    Additional Recommendations:    1) Daily weights  2) Strict I & O's  3) Daily lyte checks including magnesium and phos  4) Weekly triglycerides/LFT checks  5) POCT q6hrs; maintain 140-180mg/dL  6) if on PN > 6 days, consider placing PICC line to meet 100% of nutr needs  7) Advance to a low fiber diet as soon as medically feasible to optimize nutritional status; F/U for ostomy MNT when diet adv    *will continue to monitor and adjust PN prn*  Ina Branch, PhD, MS, RDN

## 2024-11-17 LAB
ALBUMIN SERPL ELPH-MCNC: 2.9 G/DL — LOW (ref 3.3–5)
ALP SERPL-CCNC: 109 U/L — SIGNIFICANT CHANGE UP (ref 40–120)
ALT FLD-CCNC: 248 U/L — HIGH (ref 12–78)
ANION GAP SERPL CALC-SCNC: 5 MMOL/L — SIGNIFICANT CHANGE UP (ref 5–17)
AST SERPL-CCNC: 111 U/L — HIGH (ref 15–37)
BILIRUB SERPL-MCNC: 0.4 MG/DL — SIGNIFICANT CHANGE UP (ref 0.2–1.2)
BUN SERPL-MCNC: 27 MG/DL — HIGH (ref 7–23)
CALCIUM SERPL-MCNC: 8.7 MG/DL — SIGNIFICANT CHANGE UP (ref 8.5–10.1)
CHLORIDE SERPL-SCNC: 110 MMOL/L — HIGH (ref 96–108)
CO2 SERPL-SCNC: 25 MMOL/L — SIGNIFICANT CHANGE UP (ref 22–31)
CREAT SERPL-MCNC: 0.98 MG/DL — SIGNIFICANT CHANGE UP (ref 0.5–1.3)
EGFR: 102 ML/MIN/1.73M2 — SIGNIFICANT CHANGE UP
GLUCOSE BLDC GLUCOMTR-MCNC: 88 MG/DL — SIGNIFICANT CHANGE UP (ref 70–99)
GLUCOSE BLDC GLUCOMTR-MCNC: 97 MG/DL — SIGNIFICANT CHANGE UP (ref 70–99)
GLUCOSE SERPL-MCNC: 114 MG/DL — HIGH (ref 70–99)
HCT VFR BLD CALC: 38.8 % — LOW (ref 39–50)
HGB BLD-MCNC: 13.2 G/DL — SIGNIFICANT CHANGE UP (ref 13–17)
MAGNESIUM SERPL-MCNC: 2.5 MG/DL — SIGNIFICANT CHANGE UP (ref 1.6–2.6)
MCHC RBC-ENTMCNC: 29.7 PG — SIGNIFICANT CHANGE UP (ref 27–34)
MCHC RBC-ENTMCNC: 34 G/DL — SIGNIFICANT CHANGE UP (ref 32–36)
MCV RBC AUTO: 87.4 FL — SIGNIFICANT CHANGE UP (ref 80–100)
PHOSPHATE SERPL-MCNC: 3.6 MG/DL — SIGNIFICANT CHANGE UP (ref 2.5–4.5)
PLATELET # BLD AUTO: 512 K/UL — HIGH (ref 150–400)
POTASSIUM SERPL-MCNC: 3.6 MMOL/L — SIGNIFICANT CHANGE UP (ref 3.5–5.3)
POTASSIUM SERPL-SCNC: 3.6 MMOL/L — SIGNIFICANT CHANGE UP (ref 3.5–5.3)
PROT SERPL-MCNC: 7.1 GM/DL — SIGNIFICANT CHANGE UP (ref 6–8.3)
RBC # BLD: 4.44 M/UL — SIGNIFICANT CHANGE UP (ref 4.2–5.8)
RBC # FLD: 13.6 % — SIGNIFICANT CHANGE UP (ref 10.3–14.5)
SODIUM SERPL-SCNC: 140 MMOL/L — SIGNIFICANT CHANGE UP (ref 135–145)
WBC # BLD: 10.06 K/UL — SIGNIFICANT CHANGE UP (ref 3.8–10.5)
WBC # FLD AUTO: 10.06 K/UL — SIGNIFICANT CHANGE UP (ref 3.8–10.5)

## 2024-11-17 RX ORDER — METOCLOPRAMIDE HYDROCHLORIDE 10 MG/1
10 TABLET ORAL EVERY 8 HOURS
Refills: 0 | Status: DISCONTINUED | OUTPATIENT
Start: 2024-11-17 | End: 2024-11-17

## 2024-11-17 RX ORDER — METOCLOPRAMIDE HYDROCHLORIDE 10 MG/1
10 TABLET ORAL EVERY 8 HOURS
Refills: 0 | Status: DISCONTINUED | OUTPATIENT
Start: 2024-11-17 | End: 2024-11-20

## 2024-11-17 RX ORDER — FAT EMULSIONS 20 %
0.84 EMULSION INTRAVENOUS
Qty: 100 | Refills: 0 | Status: DISCONTINUED | OUTPATIENT
Start: 2024-11-17 | End: 2024-11-17

## 2024-11-17 RX ORDER — SODIUM/POT/MAG/CALC/CHLOR/ACET 35-20-5MEQ
1 VIAL (ML) INTRAVENOUS
Refills: 0 | Status: DISCONTINUED | OUTPATIENT
Start: 2024-11-17 | End: 2024-11-17

## 2024-11-17 RX ADMIN — METOCLOPRAMIDE HYDROCHLORIDE 10 MILLIGRAM(S): 10 TABLET ORAL at 21:46

## 2024-11-17 RX ADMIN — Medication 41.7 GM/KG/DAY: at 23:12

## 2024-11-17 RX ADMIN — Medication 5000 UNIT(S): at 13:51

## 2024-11-17 RX ADMIN — Medication 1 EACH: at 23:12

## 2024-11-17 RX ADMIN — Medication 5000 UNIT(S): at 21:46

## 2024-11-17 RX ADMIN — Medication 5000 UNIT(S): at 06:23

## 2024-11-17 NOTE — CHART NOTE - NSCHARTNOTEFT_GEN_A_CORE
Clinical Nutrition PN Follow Up Note    *39 year old male with PMH of recent dx of diverticulitis about four weeks ago, started on ABx as outpatient, but didn't complete the course, presents with cc of abdominal pain for a day in lower abdomen, mild nausea w/o emesis. Passing gas, Last BM yesterday (11/8/24). CT w/ Acute sigmoid diverticulitis w/ tiny focus of adjacent free air and a small 1.0 cm region of loculated fluid. No organized drainable fluid collection. Colorectal surgery following, pending ?possible surgery w/ possible colostomy creation; pending repeat CT scan.   *11/14: PPN Day #1: pt has failed conservative management, now POD3 Catalina's procedure. NGT in place w/ bilious output, almost 2L overnight. Has been NPO x 6 days, meeting <50% ENN. Possible NGT clamp trial today per surgery. Now with ileus as per surgery, plan to initiate PPN today 11/14. Ostomy not functioning. Pt still w/o muscle/ fat wasting. Walking in hallway upon RD visit. Wound/ostomy RN consulted. Pt is at HIGH RISK for refeeding syndrome and PCM  *11/15: seen by [facility]ON, was educated on care of ostomy and dietary restrictions. NGT noted with ~2L of output overnight. Clamp trial considered if NGT output responds to pulling back 10 cm (based on AXR on 11/14) per surgery.  F/U colostomy output (~170 mL). Remains NPO, started chewing gum yesterday to promote ostomy function.    PPN initiated 11/14 2/2 ileus, prolonged NPO status    *current status: PPN Day #4: Remains NPO; POD6 from Catalina's procedure. NGT still with large bilious output overnight, did improve from yesterday however. Clamp trial - 400 cc output per surgery. Ostomy functioning. ANTONELLA drains in place. D/w surgery will continue PPN today. Pt remains at high risk for PCM    *no new pertinent meds    *labs reviewed; Na, K, Mg, Phos WNL; Mg removed from bag yesterday, will keep out. K downtrending further, low-end normal, will increase in bag today.  No other changes.  T bili WNL will c/w trace elements in the PN bag. corrected Ca WNL, rec'd add 10mEq of Calcium Gluconate outside of PN bag as CAPS is out. TG level WNL (243) will c/w 100g of lipids daily (<400 is OK for lipids). Re-check level within a few days of receiving lipids.  Will c/w thiamine and MVI with minerals in the PN bag.  POCT WNL    11-17    140  |  110[H]  |  27[H]  ----------------------------<  114[H]  3.6   |  25  |  0.98    Ca    8.7      17 Nov 2024 04:24  Phos  3.6     11-17  Mg     2.5     11-17    TPro  7.1  /  Alb  2.9[L]  /  TBili  0.4  /  DBili  x   /  AST  111[H]  /  ALT  248[H]  /  AlkPhos  109  11-17    BMI: BMI (kg/m2): 37.6 (11-09-24 @ 03:15)  Glucose: POCT Blood Glucose.: 98 mg/dL (11-15-24 @ 06:30)    BP: 129/89 (11-15-24 @ 16:00) (123/74 - 156/99)Vital Signs Last 24 Hrs  T(C): 36.4 (11-15-24 @ 16:00), Max: 37.1 (11-15-24 @ 08:29)  T(F): 97.5 (11-15-24 @ 16:00), Max: 98.8 (11-15-24 @ 08:29)  HR: 72 (11-15-24 @ 16:00) (58 - 72)  BP: 129/89 (11-15-24 @ 16:00) (129/89 - 133/88)  BP(mean): 100 (11-15-24 @ 08:29) (100 - 100)  RR: 18 (11-15-24 @ 16:00) (18 - 18)  SpO2: 96% (11-15-24 @ 16:00) (96% - 99%)    Lipid Panel: Date/Time: 11-15-24 @ 05:05  Triglycerides, Serum: 243    POCT Blood Glucose.: 97 mg/dL (17 Nov 2024 06:23)  POCT Blood Glucose.: 97 mg/dL (16 Nov 2024 23:51)  POCT Blood Glucose.: 88 mg/dL (16 Nov 2024 18:08)  POCT Blood Glucose.: 97 mg/dL (16 Nov 2024 11:57)    *I&O's Detail    16 Nov 2024 07:01  -  17 Nov 2024 07:00  --------------------------------------------------------  IN:    Fat Emulsion (Fish Oil &amp; Plant Based) 20% Infusion: 333.6 mL    Oral Fluid: 750 mL    PPN (Peripheral Parenteral Nutrition): 2268 mL  Total IN: 3351.6 mL    OUT:    Bulb (mL): 20 mL    Colostomy (mL): 275 mL    Nasogastric/Oral tube (mL): 1650 mL    Voided (mL): 1150 mL  Total OUT: 3095 mL    Total NET: 256.6 mL  * fluid status: positive; will c/w 2400 mL total volume in PN bag today. NGT output large stil, clamped now. Ostomy functioning, small output. Will continue to monitor/adjust total PPN volume daily to maintain hydration  *(+) BM 11/16 x 2 - liquid, green via ostomy.  pt not on bowel regimen.  *edema: none noted.     *malnutrition: Does not meet criteria at this time; remains at HIGH RISK    Estimated Needs: based on 113.3 Kg (wt from RD bed scale 11/11)  Calories: 2040- 2500 Kcal (18- 22 Kcal/Kg)  Protein: 136- 159 g (1.2- 1.4 g/Kg)  Fluids: 2040- 2500 mL (18- 22 mL/Kg)    Diet, NPO (11-14-24 @ 12:30) [Active]  Parenteral Nutrition - Adult 1 Each (92 mL/Hr) TPN Continuous <Continuous>, 11-15-24 @ 22:00, 22:00, Stop order after: 1 Days    Weight History:  Height (cm): 177.8 (11-09-24 @ 03:15)  Weight (kg): 118.8 (11-09-24 @ 03:15)  BMI (kg/m2): 37.6 (11-09-24 @ 03:15)  BSA (m2): 2.34 (11-09-24 @ 03:15)    PPN Recommendations: via peripheral venous line  Total Volume: 2400 mL  x 24 hours  110 g  Amino Acids  100 g Dextrose  100 g Lipids 20%  100 mEq Sodium Chloride  56 mEq Sodium Acetate  10 mmol Sodium Phosphate  60 mEq Potassium Chloride  0 mEq Potassium Acetate  0 mmol Potassium Phosphate  0 mEq Calcium Gluconate (CAPS out of PN solution)   0 mEq Magnesium Sulfate  200 mg Thiamine  1 ml Trace Elements  10 ml MVI    Total Calories  1740     (Meets 78 %  of  Estimated Energy needs  and  76 %  Protein needs)   (osmolarity ~874)    Additional Recommendations:    1) Daily weights  2) Strict I & O's  3) Daily lyte checks including magnesium and phos  4) Weekly triglycerides/LFT checks  5) POCT q6hrs; maintain 140-180mg/dL  6) if on PN > 6 days, consider placing PICC line to meet 100% of nutr needs  7) Advance to a low fiber diet as soon as medically feasible to optimize nutritional status; F/U for ostomy MNT when diet adv    *will continue to monitor and adjust PN prn*  Sonali Jones, MS, RDN, CNSC, [phone]  Certified Nutrition

## 2024-11-17 NOTE — PROGRESS NOTE ADULT - ASSESSMENT
40 yo M now POD 6 s/p Catalina for sigmoid diverticulitis  Ostomy is functioning   Clamp trial: 400cc of fluid      Plan   -F/u colostomy output  -OOB/ambulate/pulmonary toilet  -VTE ppx w/ SCDs & Lovenox  -On TPN  -Ostomy teaching   -pain control     Will d/w CRS team & update accordingly

## 2024-11-17 NOTE — PROGRESS NOTE ADULT - ATTENDING COMMENTS
S&E  Failed clamp trial. NGT 1650cc over 24hrs.  Colostomy continues to be functional.    Vital Signs Last 24 Hrs  T(C): 36.7 (17 Nov 2024 08:00), Max: 37 (17 Nov 2024 00:25)  T(F): 98.1 (17 Nov 2024 08:00), Max: 98.6 (17 Nov 2024 00:25)  HR: 63 (17 Nov 2024 08:00) (54 - 63)  BP: 147/96 (17 Nov 2024 08:00) (133/87 - 147/96)  BP(mean): 100 (16 Nov 2024 16:04) (100 - 100)  RR: 16 (17 Nov 2024 08:00) (16 - 16)  SpO2: 97% (17 Nov 2024 08:00) (97% - 98%)    NAD  soft, NTND, colostomy with stool                          13.2   10.06 )-----------( 512      ( 17 Nov 2024 04:24 )             38.8   11-17    140  |  110[H]  |  27[H]  ----------------------------<  114[H]  3.6   |  25  |  0.98    Ca    8.7      17 Nov 2024 04:24  Phos  3.6     11-17  Mg     2.5     11-17    TPro  7.1  /  Alb  2.9[L]  /  TBili  0.4  /  DBili  x   /  AST  111[H]  /  ALT  248[H]  /  AlkPhos  109  11-17    A/P -   Cont NPO, NGT, IVF.  Will add reglan.

## 2024-11-18 ENCOUNTER — APPOINTMENT (OUTPATIENT)
Dept: ORTHOPEDIC SURGERY | Facility: CLINIC | Age: 39
End: 2024-11-18

## 2024-11-18 LAB
ALBUMIN SERPL ELPH-MCNC: 3 G/DL — LOW (ref 3.3–5)
ALP SERPL-CCNC: 110 U/L — SIGNIFICANT CHANGE UP (ref 40–120)
ALT FLD-CCNC: 264 U/L — HIGH (ref 12–78)
ANION GAP SERPL CALC-SCNC: 4 MMOL/L — LOW (ref 5–17)
AST SERPL-CCNC: 98 U/L — HIGH (ref 15–37)
BILIRUB SERPL-MCNC: 0.4 MG/DL — SIGNIFICANT CHANGE UP (ref 0.2–1.2)
BUN SERPL-MCNC: 28 MG/DL — HIGH (ref 7–23)
CALCIUM SERPL-MCNC: 8.9 MG/DL — SIGNIFICANT CHANGE UP (ref 8.5–10.1)
CHLORIDE SERPL-SCNC: 108 MMOL/L — SIGNIFICANT CHANGE UP (ref 96–108)
CO2 SERPL-SCNC: 26 MMOL/L — SIGNIFICANT CHANGE UP (ref 22–31)
CREAT SERPL-MCNC: 1 MG/DL — SIGNIFICANT CHANGE UP (ref 0.5–1.3)
EGFR: 99 ML/MIN/1.73M2 — SIGNIFICANT CHANGE UP
GLUCOSE BLDC GLUCOMTR-MCNC: 117 MG/DL — HIGH (ref 70–99)
GLUCOSE BLDC GLUCOMTR-MCNC: 94 MG/DL — SIGNIFICANT CHANGE UP (ref 70–99)
GLUCOSE BLDC GLUCOMTR-MCNC: 95 MG/DL — SIGNIFICANT CHANGE UP (ref 70–99)
GLUCOSE BLDC GLUCOMTR-MCNC: 97 MG/DL — SIGNIFICANT CHANGE UP (ref 70–99)
GLUCOSE SERPL-MCNC: 111 MG/DL — HIGH (ref 70–99)
HCT VFR BLD CALC: 38.7 % — LOW (ref 39–50)
HGB BLD-MCNC: 13.2 G/DL — SIGNIFICANT CHANGE UP (ref 13–17)
MAGNESIUM SERPL-MCNC: 2.4 MG/DL — SIGNIFICANT CHANGE UP (ref 1.6–2.6)
MCHC RBC-ENTMCNC: 29.8 PG — SIGNIFICANT CHANGE UP (ref 27–34)
MCHC RBC-ENTMCNC: 34.1 G/DL — SIGNIFICANT CHANGE UP (ref 32–36)
MCV RBC AUTO: 87.4 FL — SIGNIFICANT CHANGE UP (ref 80–100)
PHOSPHATE SERPL-MCNC: 3.3 MG/DL — SIGNIFICANT CHANGE UP (ref 2.5–4.5)
PLATELET # BLD AUTO: 551 K/UL — HIGH (ref 150–400)
POTASSIUM SERPL-MCNC: 3.5 MMOL/L — SIGNIFICANT CHANGE UP (ref 3.5–5.3)
POTASSIUM SERPL-SCNC: 3.5 MMOL/L — SIGNIFICANT CHANGE UP (ref 3.5–5.3)
PROT SERPL-MCNC: 7.1 GM/DL — SIGNIFICANT CHANGE UP (ref 6–8.3)
RBC # BLD: 4.43 M/UL — SIGNIFICANT CHANGE UP (ref 4.2–5.8)
RBC # FLD: 13.6 % — SIGNIFICANT CHANGE UP (ref 10.3–14.5)
SODIUM SERPL-SCNC: 138 MMOL/L — SIGNIFICANT CHANGE UP (ref 135–145)
SURGICAL PATHOLOGY STUDY: SIGNIFICANT CHANGE UP
WBC # BLD: 11.51 K/UL — HIGH (ref 3.8–10.5)
WBC # FLD AUTO: 11.51 K/UL — HIGH (ref 3.8–10.5)

## 2024-11-18 RX ORDER — SODIUM/POT/MAG/CALC/CHLOR/ACET 35-20-5MEQ
1 VIAL (ML) INTRAVENOUS
Refills: 0 | Status: DISCONTINUED | OUTPATIENT
Start: 2024-11-18 | End: 2024-11-18

## 2024-11-18 RX ORDER — 0.9 % SODIUM CHLORIDE 0.9 %
1000 INTRAVENOUS SOLUTION INTRAVENOUS ONCE
Refills: 0 | Status: COMPLETED | OUTPATIENT
Start: 2024-11-18 | End: 2024-11-18

## 2024-11-18 RX ORDER — PIPERACILLIN SODIUM AND TAZOBACTAM SODIUM 4; .5 G/20ML; G/20ML
3.38 INJECTION, POWDER, LYOPHILIZED, FOR SOLUTION INTRAVENOUS EVERY 8 HOURS
Refills: 0 | Status: DISCONTINUED | OUTPATIENT
Start: 2024-11-18 | End: 2024-11-20

## 2024-11-18 RX ORDER — PANTOPRAZOLE SODIUM 40 MG/1
40 TABLET, DELAYED RELEASE ORAL DAILY
Refills: 0 | Status: DISCONTINUED | OUTPATIENT
Start: 2024-11-18 | End: 2024-11-20

## 2024-11-18 RX ORDER — FAT EMULSIONS 20 %
0.84 EMULSION INTRAVENOUS
Qty: 100 | Refills: 0 | Status: DISCONTINUED | OUTPATIENT
Start: 2024-11-18 | End: 2024-11-18

## 2024-11-18 RX ADMIN — PIPERACILLIN SODIUM AND TAZOBACTAM SODIUM 25 GRAM(S): 4; .5 INJECTION, POWDER, LYOPHILIZED, FOR SOLUTION INTRAVENOUS at 21:17

## 2024-11-18 RX ADMIN — Medication 5000 UNIT(S): at 21:17

## 2024-11-18 RX ADMIN — METOCLOPRAMIDE HYDROCHLORIDE 10 MILLIGRAM(S): 10 TABLET ORAL at 05:56

## 2024-11-18 RX ADMIN — Medication 5000 UNIT(S): at 05:56

## 2024-11-18 RX ADMIN — HYDROMORPHONE HYDROCHLORIDE 0.5 MILLIGRAM(S): 2 TABLET ORAL at 00:41

## 2024-11-18 RX ADMIN — METOCLOPRAMIDE HYDROCHLORIDE 10 MILLIGRAM(S): 10 TABLET ORAL at 15:15

## 2024-11-18 RX ADMIN — Medication 1000 MILLILITER(S): at 06:52

## 2024-11-18 RX ADMIN — Medication 5000 UNIT(S): at 15:15

## 2024-11-18 RX ADMIN — PIPERACILLIN SODIUM AND TAZOBACTAM SODIUM 25 GRAM(S): 4; .5 INJECTION, POWDER, LYOPHILIZED, FOR SOLUTION INTRAVENOUS at 15:15

## 2024-11-18 RX ADMIN — HYDROMORPHONE HYDROCHLORIDE 0.5 MILLIGRAM(S): 2 TABLET ORAL at 00:26

## 2024-11-18 RX ADMIN — Medication 41.7 GM/KG/DAY: at 22:52

## 2024-11-18 RX ADMIN — METOCLOPRAMIDE HYDROCHLORIDE 10 MILLIGRAM(S): 10 TABLET ORAL at 21:17

## 2024-11-18 RX ADMIN — Medication 1 EACH: at 22:50

## 2024-11-18 NOTE — PROGRESS NOTE ADULT - ATTENDING COMMENTS
S&E  NGT removed after minimal residual.  Colostomy functional.    Vital Signs Last 24 Hrs  T(C): 37 (18 Nov 2024 09:04), Max: 37 (18 Nov 2024 00:22)  T(F): 98.6 (18 Nov 2024 09:04), Max: 98.6 (18 Nov 2024 00:22)  HR: 62 (18 Nov 2024 09:04) (55 - 62)  BP: 115/81 (18 Nov 2024 09:04) (115/81 - 139/84)  BP(mean): 92 (18 Nov 2024 09:04) (92 - 92)  RR: 17 (18 Nov 2024 09:04) (17 - 17)  SpO2: 97% (18 Nov 2024 09:04) (97% - 97%)    NAD  soft, NTND                           13.2   11.51 )-----------( 551      ( 18 Nov 2024 04:16 )             38.7   11-18    138  |  108  |  28[H]  ----------------------------<  111[H]  3.5   |  26  |  1.00    Ca    8.9      18 Nov 2024 04:16  Phos  3.3     11-18  Mg     2.4     11-18    TPro  7.1  /  Alb  3.0[L]  /  TBili  0.4  /  DBili  x   /  AST  98[H]  /  ALT  264[H]  /  AlkPhos  110  11-18    A/P -   Start full liquid diet.  Ambulate.

## 2024-11-18 NOTE — CHART NOTE - NSCHARTNOTEFT_GEN_A_CORE
Clinical Nutrition PN Follow Up Note    *39 year old male with PMH of recent dx of diverticulitis about four weeks ago, started on ABx as outpatient, but didn't complete the course, presents with cc of abdominal pain for a day in lower abdomen, mild nausea w/o emesis. Passing gas, Last BM yesterday (11/8/24). CT w/ Acute sigmoid diverticulitis w/ tiny focus of adjacent free air and a small 1.0 cm region of loculated fluid. No organized drainable fluid collection. Colorectal surgery following, pending ?possible surgery w/ possible colostomy creation; pending repeat CT scan.   *11/14: PPN Day #1: pt has failed conservative management, now POD3 Catalina's procedure. NGT in place w/ bilious output, almost 2L overnight. Has been NPO x 6 days, meeting <50% ENN. Possible NGT clamp trial today per surgery. Now with ileus as per surgery, plan to initiate PPN today 11/14. Ostomy not functioning. Pt still w/o muscle/ fat wasting. Walking in hallway upon RD visit. Wound/ostomy RN consulted. Pt is at HIGH RISK for refeeding syndrome and PCM  *11/15: seen by CWON, was educated on care of ostomy and dietary restrictions. NGT noted with ~2L of output overnight. Clamp trial considered if NGT output responds to pulling back 10 cm (based on AXR on 11/14) per surgery.  F/U colostomy output (~170 mL). Remains NPO, started chewing gum yesterday to promote ostomy function.  *11/17: PPN Day #4: Remains NPO; POD6 from Catalina's procedure. NGT still with large bilious output overnight, did improve from yesterday however. Clamp trial - 400 cc output per surgery. Ostomy functioning. ANTONELLA drains in place. D/w surgery will continue PPN today. Pt remains at high risk for PCM    PPN initiated 11/14 2/2 ileus, prolonged NPO status    *current status: PPN Day #5: Remains NPO, chewing gum and hard candies encouraged to help with ostomy function. POD7 from Catalina's procedure. Ostomy continues to be functioning with ~400 mL output. Failed clamp trial yesterday - per surgery. NGT still with high output overnight (~2L). Per nursing flowsheet: NGT LCWS with dark brown output, flushed q 6 hours with 30ml water per order. ANTONELLA drains in place producing serous fluid. Reglan administered for nausea, no vomiting overnight per flowsheet. D/w surgery will c/w PPN today.    *new pertinent meds: Reglan, Dilaudid, LR    *labs reviewed; All lytes WNL. Na with slow down trend, will keep total the same in the bag today. K+ continues to be on low end of normal will increase in PN bag again today. Phos downtrending will keep total the same in the PN bag today; may need to increase tomorrow. Will keep Mg out of PN bag again today. Continue to replete lytes outside of PN bag. T bili WNL will c/w trace elements in the PN bag. corrected Ca WNL, rec'd add 10mEq of Calcium Gluconate outside of PN bag as CAPS is out. TG level WNL (243) will c/w 100g of lipids daily, (<400 is okay for lipids). Recommend to re-check level within a few days of receiving lipids. Will c/w thiamine and MVI with minerals in the PN bag. POCTs WNL x 24 hours - as per PN protocol, POCT required q6 hours to monitor glycemic control; should be maintained between 140- 180 mg/dL.    11-18    138  |  108  |  28[H]  ----------------------------<  111[H]  3.5   |  26  |  1.00    Ca    8.9      18 Nov 2024 04:16  Phos  3.3     11-18  Mg     2.4     11-18    TPro  7.1  /  Alb  3.0[L]  /  TBili  0.4  /  DBili  x   /  AST  98[H]  /  ALT  264[H]  /  AlkPhos  110  11-18    BMI: BMI (kg/m2): 37.6 (11-09-24 @ 03:15)  HbA1c:   Glucose: POCT Blood Glucose.: 97 mg/dL (11-18-24 @ 06:03)    BP: 139/84 (11-18-24 @ 00:22) (129/89 - 147/96)Vital Signs Last 24 Hrs  T(C): 37 (11-18-24 @ 00:22), Max: 37.1 (11-17-24 @ 16:00)  T(F): 98.6 (11-18-24 @ 00:22), Max: 98.8 (11-17-24 @ 16:00)  HR: 55 (11-18-24 @ 00:22) (55 - 63)  BP: 139/84 (11-18-24 @ 00:22) (133/79 - 147/96)  BP(mean): 91 (11-17-24 @ 16:00) (91 - 91)  RR: 17 (11-18-24 @ 00:22) (16 - 17)  SpO2: 97% (11-18-24 @ 00:22) (96% - 97%)    Lipid Panel: Date/Time: 11-15-24 @ 05:05  Triglycerides, Serum: 243    POCT Blood Glucose.: 97 mg/dL (11-18-24 @ 06:03)  POCT Blood Glucose.: 94 mg/dL (11-18-24 @ 00:30)  POCT Blood Glucose.: 88 mg/dL (11-17-24 @ 18:08)    *I&O's Detail    17 Nov 2024 07:01  -  18 Nov 2024 07:00  --------------------------------------------------------  IN:    Fat Emulsion (Fish Oil &amp; Plant Based) 20% Infusion: 291.9 mL    PPN (Peripheral Parenteral Nutrition): 700 mL  Total IN: 991.9 mL    OUT:    Bulb (mL): 15 mL    Colostomy (mL): 400 mL    Nasogastric/Oral tube (mL): 2125 mL  Total OUT: 2540 mL    Total NET: -1548.1 mL    * fluid status: negative; NGT still with high output (~2L); ostomy noted with ~400mL output. LR not doc'd for input  *BM 11/16 x 2 - liquid, green via ostomy- ostomy continues to have output.  pt not on bowel regimen.  *edema: none doc'd    *malnutrition: Does not meet criteria at this time; remains at HIGH RISK.    Estimated Needs: based on 113.3 Kg (wt from RD bed scale 11/11)  Calories: 2040- 2500 Kcal (18- 22 Kcal/Kg)  Protein: 136- 159 g (1.2- 1.4 g/Kg)  Fluids: 2040- 2500 mL (18- 22 mL/Kg)    Diet, NPO (11-14-24 @ 12:30) [Active]  Parenteral Nutrition - Adult 1 Each (100 mL/Hr) TPN Continuous <Continuous>, 11-17-24 @ 22:00, 22:00, Stop order after: 1 Days    Weight History:  Height (cm): 177.8 (11-09-24 @ 03:15)  Weight (kg): 118.8 (11-09-24 @ 03:15)  BMI (kg/m2): 37.6 (11-09-24 @ 03:15)  BSA (m2): 2.34 (11-09-24 @ 03:15)    PPN Recommendations: via peripheral line  Total Volume:  x 24 hours  110 g  Amino Acids  100 g Dextrose  100 g Lipids 20%  100 mEq Sodium Chloride  56 mEq Sodium Acetate  10 mmol Sodium Phosphate  65 mEq Potassium Chloride  0 mEq Potassium Acetate  0 mmol Potassium Phosphate  0 mEq Calcium Gluconate (CAPS out of PN solution)   0 mEq Magnesium Sulfate  200 mg Thiamine  1 ml Trace Elements  10 ml MVI    Total Calories  1740  (Meets 78 %  of  Estimated Energy needs  and  76 %  Protein needs)    (osmolarity ~878)    Additional Recommendations:    1) Daily weights  2) Strict I & O's  3) Daily lyte checks including magnesium and phos  4) Weekly triglycerides/LFT checks  5) POCT q6hrs; maintain 140-180mg/dL  6) if on PN > 6 days, consider placing PICC line to meet 100% of nutr needs - currently day #5 of PPN.  7) Advance to a low fiber diet as soon as medically feasible to optimize nutritional status; F/U for ostomy MNT when diet adv    *will continue to monitor and adjust PN prn*  Ina Branch, PhD, MS, [phone] Clinical Nutrition PN Follow Up Note    *39 year old male with PMH of recent dx of diverticulitis about four weeks ago, started on ABx as outpatient, but didn't complete the course, presents with cc of abdominal pain for a day in lower abdomen, mild nausea w/o emesis. Passing gas, Last BM yesterday (11/8/24). CT w/ Acute sigmoid diverticulitis w/ tiny focus of adjacent free air and a small 1.0 cm region of loculated fluid. No organized drainable fluid collection. Colorectal surgery following, pending ?possible surgery w/ possible colostomy creation; pending repeat CT scan.   *11/14: PPN Day #1: pt has failed conservative management, now POD3 Catalina's procedure. NGT in place w/ bilious output, almost 2L overnight. Has been NPO x 6 days, meeting <50% ENN. Possible NGT clamp trial today per surgery. Now with ileus as per surgery, plan to initiate PPN today 11/14. Ostomy not functioning. Pt still w/o muscle/ fat wasting. Walking in hallway upon RD visit. Wound/ostomy RN consulted. Pt is at HIGH RISK for refeeding syndrome and PCM  *11/15: seen by CWON, was educated on care of ostomy and dietary restrictions. NGT noted with ~2L of output overnight. Clamp trial considered if NGT output responds to pulling back 10 cm (based on AXR on 11/14) per surgery.  F/U colostomy output (~170 mL). Remains NPO, started chewing gum yesterday to promote ostomy function.  *11/17: PPN Day #4: Remains NPO; POD6 from Catalina's procedure. NGT still with large bilious output overnight, did improve from yesterday however. Clamp trial - 400 cc output per surgery. Ostomy functioning. ANTONELLA drains in place. D/w surgery will continue PPN today. Pt remains at high risk for PCM    PPN initiated 11/14 2/2 ileus, prolonged NPO status    *current status: PPN Day #5: Remains NPO, chewing gum and hard candies encouraged to help with ostomy function. POD7 from Catalina's procedure. Ostomy continues to be functioning with ~400 mL output + gas. Failed clamp trial yesterday (400cc residual) per surgery. NGT still with high output overnight (~2L). ANTONELLA drains in place producing serous fluid. Reglan administered for nausea, no vomiting overnight per flowsheet. D/w surgery will c/w PPN today.    *new pertinent meds: Reglan, Dilaudid, LR    *labs reviewed; All lytes WNL. Na with slow down trend, will keep total the same in the bag today. K+ continues to be on low end of normal will increase in PN bag again today. Phos downtrending will keep total the same in the PN bag today; may need to increase tomorrow. Will keep Mg out of PN bag again today. Continue to replete lytes outside of PN bag. T bili WNL will c/w trace elements in the PN bag. corrected Ca WNL, rec'd add 10mEq of Calcium Gluconate outside of PN bag as CAPS is out. TG level WNL (243) will c/w 100g of lipids daily, (<400 is okay for lipids). Recommend to re-check level within a few days of receiving lipids. Will c/w thiamine and MVI with minerals in the PN bag. POCTs WNL x 24 hours - as per PN protocol, POCT required q6 hours to monitor glycemic control; should be maintained between 140- 180 mg/dL.    11-18    138  |  108  |  28[H]  ----------------------------<  111[H]  3.5   |  26  |  1.00    Ca    8.9      18 Nov 2024 04:16  Phos  3.3     11-18  Mg     2.4     11-18    TPro  7.1  /  Alb  3.0[L]  /  TBili  0.4  /  DBili  x   /  AST  98[H]  /  ALT  264[H]  /  AlkPhos  110  11-18    BMI: BMI (kg/m2): 37.6 (11-09-24 @ 03:15)  HbA1c:   Glucose: POCT Blood Glucose.: 97 mg/dL (11-18-24 @ 06:03)    BP: 139/84 (11-18-24 @ 00:22) (129/89 - 147/96)Vital Signs Last 24 Hrs  T(C): 37 (11-18-24 @ 00:22), Max: 37.1 (11-17-24 @ 16:00)  T(F): 98.6 (11-18-24 @ 00:22), Max: 98.8 (11-17-24 @ 16:00)  HR: 55 (11-18-24 @ 00:22) (55 - 63)  BP: 139/84 (11-18-24 @ 00:22) (133/79 - 147/96)  BP(mean): 91 (11-17-24 @ 16:00) (91 - 91)  RR: 17 (11-18-24 @ 00:22) (16 - 17)  SpO2: 97% (11-18-24 @ 00:22) (96% - 97%)    Lipid Panel: Date/Time: 11-15-24 @ 05:05  Triglycerides, Serum: 243    POCT Blood Glucose.: 97 mg/dL (11-18-24 @ 06:03)  POCT Blood Glucose.: 94 mg/dL (11-18-24 @ 00:30)  POCT Blood Glucose.: 88 mg/dL (11-17-24 @ 18:08)    *I&O's Detail    17 Nov 2024 07:01  -  18 Nov 2024 07:00  --------------------------------------------------------  IN:    Fat Emulsion (Fish Oil &amp; Plant Based) 20% Infusion: 291.9 mL    PPN (Peripheral Parenteral Nutrition): 700 mL  Total IN: 991.9 mL    OUT:    Bulb (mL): 15 mL    Colostomy (mL): 400 mL    Nasogastric/Oral tube (mL): 2125 mL  Total OUT: 2540 mL    Total NET: -1548.1 mL    * fluid status: negative; NGT still with high output (~2L); ostomy noted with ~400mL output. LR not doc'd for input  *BM 11/16 x 2 - liquid, green via ostomy- ostomy continues to have output.  pt not on bowel regimen.  *edema: none doc'd    *malnutrition: Does not meet criteria at this time; remains at HIGH RISK.    Estimated Needs: based on 113.3 Kg (wt from RD bed scale 11/11)  Calories: 2040- 2500 Kcal (18- 22 Kcal/Kg)  Protein: 136- 159 g (1.2- 1.4 g/Kg)  Fluids: 2040- 2500 mL (18- 22 mL/Kg)    Diet, NPO (11-14-24 @ 12:30) [Active]  Parenteral Nutrition - Adult 1 Each (100 mL/Hr) TPN Continuous <Continuous>, 11-17-24 @ 22:00, 22:00, Stop order after: 1 Days    Weight History:  Height (cm): 177.8 (11-09-24 @ 03:15)  Weight (kg): 118.8 (11-09-24 @ 03:15)  BMI (kg/m2): 37.6 (11-09-24 @ 03:15)  BSA (m2): 2.34 (11-09-24 @ 03:15)    PPN Recommendations: via peripheral line  Total Volume:  x 24 hours  110 g  Amino Acids  100 g Dextrose  100 g Lipids 20%  100 mEq Sodium Chloride  56 mEq Sodium Acetate  10 mmol Sodium Phosphate  65 mEq Potassium Chloride  0 mEq Potassium Acetate  0 mmol Potassium Phosphate  0 mEq Calcium Gluconate (CAPS out of PN solution)   0 mEq Magnesium Sulfate  200 mg Thiamine  1 ml Trace Elements  10 ml MVI    Total Calories  1740  (Meets 78 %  of  Estimated Energy needs  and  76 %  Protein needs)    (osmolarity ~878)    Additional Recommendations:    1) Daily weights  2) Strict I & O's  3) Daily lyte checks including magnesium and phos  4) Weekly triglycerides/LFT checks  5) POCT q6hrs; maintain 140-180mg/dL  6) if on PN > 6 days, consider placing PICC line to meet 100% of nutr needs - currently day #5 of PPN.  7) Advance to a low fiber diet as soon as medically feasible to optimize nutritional status; F/U for ostomy MNT when diet adv    *will continue to monitor and adjust PN prn*  Ina Branch, PhD, MS, [phone] Clinical Nutrition PN Follow Up Note    *39 year old male with PMH of recent dx of diverticulitis about four weeks ago, started on ABx as outpatient, but didn't complete the course, presents with cc of abdominal pain for a day in lower abdomen, mild nausea w/o emesis. Passing gas, Last BM yesterday (11/8/24). CT w/ Acute sigmoid diverticulitis w/ tiny focus of adjacent free air and a small 1.0 cm region of loculated fluid. No organized drainable fluid collection. Colorectal surgery following, pending ?possible surgery w/ possible colostomy creation; pending repeat CT scan.   *11/14: PPN Day #1: pt has failed conservative management, now POD3 Catalina's procedure. NGT in place w/ bilious output, almost 2L overnight. Has been NPO x 6 days, meeting <50% ENN. Possible NGT clamp trial today per surgery. Now with ileus as per surgery, plan to initiate PPN today 11/14. Ostomy not functioning. Pt still w/o muscle/ fat wasting. Walking in hallway upon RD visit. Wound/ostomy RN consulted. Pt is at HIGH RISK for refeeding syndrome and PCM  *11/15: seen by CWON, was educated on care of ostomy and dietary restrictions. NGT noted with ~2L of output overnight. Clamp trial considered if NGT output responds to pulling back 10 cm (based on AXR on 11/14) per surgery.  F/U colostomy output (~170 mL). Remains NPO, started chewing gum yesterday to promote ostomy function.  *11/17: PPN Day #4: Remains NPO; POD6 from Catalina's procedure. NGT still with large bilious output overnight, did improve from yesterday however. Clamp trial - 400 cc output per surgery. Ostomy functioning. ANTONELLA drains in place. D/w surgery will continue PPN today. Pt remains at high risk for PCM    PPN initiated 11/14 2/2 ileus, prolonged NPO status    *current status: PPN Day #5: Remains NPO w/ NGT to LWS. Failed clamp trial yesterday (400cc residual) per surgery. NGT still with high output overnight (~2L). Reglan administered for nausea, no vomiting overnight per flowsheet. D/w surgery will c/w PPN today.    *new pertinent meds: Reglan, Dilaudid, LR    *labs reviewed; All lytes WNL. Na with slow down trend, will keep total the same in the bag today. K+ continues to be on low end of normal will increase in PN bag again today. Phos downtrending will keep total the same in the PN bag today; may need to increase tomorrow. Will keep Mg out of PN bag again today; if continues to lower or stay WNL, will add 5mg of Mg back into PN bag. Continue to replete lytes outside of PN bag. T bili WNL will c/w trace elements in the PN bag. corrected Ca WNL, rec'd add 10mEq of Calcium Gluconate outside of PN bag as CAPS is out. TG level WNL (243) will c/w 100g of lipids daily. POCTs WNL x 24 hours.    11-18    138  |  108  |  28[H]  ----------------------------<  111[H]  3.5   |  26  |  1.00    Ca    8.9      18 Nov 2024 04:16  Phos  3.3     11-18  Mg     2.4     11-18    TPro  7.1  /  Alb  3.0[L]  /  TBili  0.4  /  DBili  x   /  AST  98[H]  /  ALT  264[H]  /  AlkPhos  110  11-18    BMI: BMI (kg/m2): 37.6 (11-09-24 @ 03:15)  HbA1c:   Glucose: POCT Blood Glucose.: 97 mg/dL (11-18-24 @ 06:03)    BP: 139/84 (11-18-24 @ 00:22) (129/89 - 147/96)Vital Signs Last 24 Hrs  T(C): 37 (11-18-24 @ 00:22), Max: 37.1 (11-17-24 @ 16:00)  T(F): 98.6 (11-18-24 @ 00:22), Max: 98.8 (11-17-24 @ 16:00)  HR: 55 (11-18-24 @ 00:22) (55 - 63)  BP: 139/84 (11-18-24 @ 00:22) (133/79 - 147/96)  BP(mean): 91 (11-17-24 @ 16:00) (91 - 91)  RR: 17 (11-18-24 @ 00:22) (16 - 17)  SpO2: 97% (11-18-24 @ 00:22) (96% - 97%)    Lipid Panel: Date/Time: 11-15-24 @ 05:05  Triglycerides, Serum: 243    POCT Blood Glucose.: 97 mg/dL (11-18-24 @ 06:03)  POCT Blood Glucose.: 94 mg/dL (11-18-24 @ 00:30)  POCT Blood Glucose.: 88 mg/dL (11-17-24 @ 18:08)    *I&O's Detail    17 Nov 2024 07:01  -  18 Nov 2024 07:00  --------------------------------------------------------  IN:    Fat Emulsion (Fish Oil &amp; Plant Based) 20% Infusion: 291.9 mL    PPN (Peripheral Parenteral Nutrition): 700 mL  Total IN: 991.9 mL    OUT:    Bulb (mL): 15 mL    Colostomy (mL): 400 mL    Nasogastric/Oral tube (mL): 2125 mL  Total OUT: 2540 mL    Total NET: -1548.1 mL    * fluid status: negative; NGT still with high output (~2L); ostomy noted with ~400mL output. LR not doc'd for input; PPN volume not correctly doc'd (pt receiving 2.4L)  *BM 11/16 x 2 - liquid, green via ostomy- ostomy continues to have output.  pt not on bowel regimen.  *edema: none doc'd    *malnutrition: Does not meet criteria at this time; remains at HIGH RISK.    Estimated Needs: based on 113.3 Kg (wt from RD bed scale 11/11)  Calories: 2040- 2500 Kcal (18- 22 Kcal/Kg)  Protein: 136- 159 g (1.2- 1.4 g/Kg)  Fluids: 2040- 2500 mL (18- 22 mL/Kg)    Diet, NPO (11-14-24 @ 12:30) [Active]  Parenteral Nutrition - Adult 1 Each (100 mL/Hr) TPN Continuous <Continuous>, 11-17-24 @ 22:00, 22:00, Stop order after: 1 Days    Weight History:  Height (cm): 177.8 (11-09-24 @ 03:15)  Weight (kg): 118.8 (11-09-24 @ 03:15)  BMI (kg/m2): 37.6 (11-09-24 @ 03:15)  BSA (m2): 2.34 (11-09-24 @ 03:15)    PPN Recommendations: via peripheral line  Total Volume: 2400mL x 24 hours  110 g  Amino Acids  100 g Dextrose  100 g Lipids 20%  100 mEq Sodium Chloride  56 mEq Sodium Acetate  10 mmol Sodium Phosphate  68 mEq Potassium Chloride  2 mEq Potassium Acetate  0 mmol Potassium Phosphate  0 mEq Calcium Gluconate (CAPS out of PN solution)   0 mEq Magnesium Sulfate  200 mg Thiamine  1 ml Trace Elements  10 ml MVI    Total Calories  1740  (Meets 78 %  of  Estimated Energy needs  and  76 %  Protein needs)    (osmolarity ~882)    Additional Recommendations:    1) Daily weights  2) Strict I & O's  3) Daily lyte checks including magnesium and phos  4) Weekly triglycerides/LFT checks  5) POCT q6hrs; maintain 140-180mg/dL  6) if on PN > 6 days, consider placing PICC line to meet 100% of nutr needs - currently day #5 of PPN.  7) Advance to a low fiber diet as soon as medically feasible to optimize nutritional status; F/U for ostomy MNT when diet adv    *will continue to monitor and adjust PN prn*  Ina Branch, PhD, MS, [phone]  Anni Cavazos, MS, RDN, CDN, [facility] [phone]  Certified Nutrition

## 2024-11-18 NOTE — PROGRESS NOTE ADULT - ASSESSMENT
38 yo M now POD7 s/p Catalina for sigmoid diverticulitis  Ostomy +Gas/BM  Clamp trial 11/16: 400cc of fluid      Plan   -c/w NGT  -F/u colostomy output  -OOB/ambulate/pulmonary toilet  -VTE ppx w/ SCDs & Lovenox  -c/w TPN  -Ostomy teaching   -pain control     Will d/w CRS team & update accordingly

## 2024-11-19 LAB
ALBUMIN SERPL ELPH-MCNC: 3 G/DL — LOW (ref 3.3–5)
ALP SERPL-CCNC: 103 U/L — SIGNIFICANT CHANGE UP (ref 40–120)
ALT FLD-CCNC: 208 U/L — HIGH (ref 12–78)
ANION GAP SERPL CALC-SCNC: 3 MMOL/L — LOW (ref 5–17)
AST SERPL-CCNC: 53 U/L — HIGH (ref 15–37)
BILIRUB SERPL-MCNC: 0.3 MG/DL — SIGNIFICANT CHANGE UP (ref 0.2–1.2)
BUN SERPL-MCNC: 24 MG/DL — HIGH (ref 7–23)
CALCIUM SERPL-MCNC: 8.9 MG/DL — SIGNIFICANT CHANGE UP (ref 8.5–10.1)
CHLORIDE SERPL-SCNC: 108 MMOL/L — SIGNIFICANT CHANGE UP (ref 96–108)
CO2 SERPL-SCNC: 26 MMOL/L — SIGNIFICANT CHANGE UP (ref 22–31)
CREAT SERPL-MCNC: 1 MG/DL — SIGNIFICANT CHANGE UP (ref 0.5–1.3)
EGFR: 99 ML/MIN/1.73M2 — SIGNIFICANT CHANGE UP
GLUCOSE BLDC GLUCOMTR-MCNC: 102 MG/DL — HIGH (ref 70–99)
GLUCOSE BLDC GLUCOMTR-MCNC: 114 MG/DL — HIGH (ref 70–99)
GLUCOSE SERPL-MCNC: 113 MG/DL — HIGH (ref 70–99)
HCT VFR BLD CALC: 36.6 % — LOW (ref 39–50)
HGB BLD-MCNC: 12.5 G/DL — LOW (ref 13–17)
MAGNESIUM SERPL-MCNC: 2.3 MG/DL — SIGNIFICANT CHANGE UP (ref 1.6–2.6)
MCHC RBC-ENTMCNC: 29.8 PG — SIGNIFICANT CHANGE UP (ref 27–34)
MCHC RBC-ENTMCNC: 34.2 G/DL — SIGNIFICANT CHANGE UP (ref 32–36)
MCV RBC AUTO: 87.1 FL — SIGNIFICANT CHANGE UP (ref 80–100)
PHOSPHATE SERPL-MCNC: 3.5 MG/DL — SIGNIFICANT CHANGE UP (ref 2.5–4.5)
PLATELET # BLD AUTO: 522 K/UL — HIGH (ref 150–400)
POTASSIUM SERPL-MCNC: 3.6 MMOL/L — SIGNIFICANT CHANGE UP (ref 3.5–5.3)
POTASSIUM SERPL-SCNC: 3.6 MMOL/L — SIGNIFICANT CHANGE UP (ref 3.5–5.3)
PROT SERPL-MCNC: 6.9 GM/DL — SIGNIFICANT CHANGE UP (ref 6–8.3)
RBC # BLD: 4.2 M/UL — SIGNIFICANT CHANGE UP (ref 4.2–5.8)
RBC # FLD: 13.5 % — SIGNIFICANT CHANGE UP (ref 10.3–14.5)
SODIUM SERPL-SCNC: 137 MMOL/L — SIGNIFICANT CHANGE UP (ref 135–145)
WBC # BLD: 12.25 K/UL — HIGH (ref 3.8–10.5)
WBC # FLD AUTO: 12.25 K/UL — HIGH (ref 3.8–10.5)

## 2024-11-19 RX ADMIN — Medication 5000 UNIT(S): at 21:13

## 2024-11-19 RX ADMIN — METOCLOPRAMIDE HYDROCHLORIDE 10 MILLIGRAM(S): 10 TABLET ORAL at 14:01

## 2024-11-19 RX ADMIN — PANTOPRAZOLE SODIUM 40 MILLIGRAM(S): 40 TABLET, DELAYED RELEASE ORAL at 09:23

## 2024-11-19 RX ADMIN — Medication 5000 UNIT(S): at 05:25

## 2024-11-19 RX ADMIN — PIPERACILLIN SODIUM AND TAZOBACTAM SODIUM 25 GRAM(S): 4; .5 INJECTION, POWDER, LYOPHILIZED, FOR SOLUTION INTRAVENOUS at 05:25

## 2024-11-19 RX ADMIN — Medication 5000 UNIT(S): at 14:01

## 2024-11-19 RX ADMIN — METOCLOPRAMIDE HYDROCHLORIDE 10 MILLIGRAM(S): 10 TABLET ORAL at 05:25

## 2024-11-19 RX ADMIN — METOCLOPRAMIDE HYDROCHLORIDE 10 MILLIGRAM(S): 10 TABLET ORAL at 21:12

## 2024-11-19 RX ADMIN — PIPERACILLIN SODIUM AND TAZOBACTAM SODIUM 25 GRAM(S): 4; .5 INJECTION, POWDER, LYOPHILIZED, FOR SOLUTION INTRAVENOUS at 21:13

## 2024-11-19 RX ADMIN — PIPERACILLIN SODIUM AND TAZOBACTAM SODIUM 25 GRAM(S): 4; .5 INJECTION, POWDER, LYOPHILIZED, FOR SOLUTION INTRAVENOUS at 14:02

## 2024-11-19 NOTE — PROGRESS NOTE ADULT - ATTENDING COMMENTS
Patient seen and examined at bedside this morning  He is doing well tolerating full liquids, ostomy is functioning, pain is controlled  Abdomen is soft, ND, AT, ostomy pink and patent, ANTONELLA drain with scant output  A/P: Advance to regular diet, continue antibiotics, discontinue Provena, discontinue drain. Continue close supportive care during recovery     Vital Signs Last 24 Hrs  T(C): 36.7 (19 Nov 2024 07:59), Max: 36.8 (19 Nov 2024 00:20)  T(F): 98 (19 Nov 2024 07:59), Max: 98.2 (19 Nov 2024 00:20)  HR: 67 (19 Nov 2024 07:59) (57 - 67)  BP: 122/65 (19 Nov 2024 07:59) (122/65 - 123/69)  BP(mean): --  RR: 18 (19 Nov 2024 07:59) (18 - 18)  SpO2: 98% (19 Nov 2024 07:59) (97% - 98%)    Parameters below as of 19 Nov 2024 07:59  Patient On (Oxygen Delivery Method): room air                          12.5   12.25 )-----------( 522      ( 19 Nov 2024 03:56 )             36.6

## 2024-11-19 NOTE — PROGRESS NOTE ADULT - ASSESSMENT
40 yo M now POD8 s/p Catalina for sigmoid diverticulitis  Ostomy +Gas/BM  Ng removed and tolerating FLD      Plan   - LRD  -F/u colostomy output  -OOB/ambulate/pulmonary toilet  -VTE ppx w/ SCDs & Lovenox  -Ostomy teaching   -pain control       Will d/w CRS team & update accordingly 38 yo M now POD8 s/p Catalina for sigmoid diverticulitis  Ostomy +Gas/BM  Ng removed and tolerating FLD      Plan   - LRD  -F/u colostomy output  -OOB/ambulate/pulmonary toilet  -VTE ppx  -Ostomy teaching   -pain control       Will d/w CRS team & update accordingly

## 2024-11-19 NOTE — CHART NOTE - NSCHARTNOTEFT_GEN_A_CORE
Clinical Nutrition PN Follow Up Note    *39 year old male with PMH of recent dx of diverticulitis about four weeks ago, started on ABx as outpatient, but didn't complete the course, presents with cc of abdominal pain for a day in lower abdomen, mild nausea w/o emesis. Passing gas, Last BM yesterday (11/8/24). CT w/ Acute sigmoid diverticulitis w/ tiny focus of adjacent free air and a small 1.0 cm region of loculated fluid. No organized drainable fluid collection. Colorectal surgery following, pending ?possible surgery w/ possible colostomy creation; pending repeat CT scan.   *11/14: PPN Day #1: pt has failed conservative management, now POD3 Catalina's procedure. NGT in place w/ bilious output, almost 2L overnight. Has been NPO x 6 days, meeting <50% ENN. Possible NGT clamp trial today per surgery. Now with ileus as per surgery, plan to initiate PPN today 11/14. Ostomy not functioning. Pt still w/o muscle/ fat wasting. Walking in hallway upon RD visit. Wound/ostomy RN consulted. Pt is at HIGH RISK for refeeding syndrome and PCM  *11/15: seen by [facility]ON, was educated on care of ostomy and dietary restrictions. NGT noted with ~2L of output overnight. Clamp trial considered if NGT output responds to pulling back 10 cm (based on AXR on 11/14) per surgery.  F/U colostomy output (~170 mL). Remains NPO, started chewing gum yesterday to promote ostomy function.  *11/17: PPN Day #4: Remains NPO; POD6 from Catalina's procedure. NGT still with large bilious output overnight, did improve from yesterday however. Clamp trial - 400 cc output per surgery. Ostomy functioning. ANTONELLA drains in place. D/w surgery will continue PPN today. Pt remains at high risk for PCM  *11/18: PPN Day #5: Remains NPO w/ NGT to LWS. Failed clamp trial yesterday (400cc residual) per surgery. NGT still with high output overnight (~2L). Reglan administered for nausea, no vomiting overnight per flowsheet. D/w surgery will c/w PPN today.    PPN initiated 11/14 2/2 ileus, prolonged NPO status    *current status: PPN Day #6: Pt advanced to FLD diet yesterday for dinner. NGT has been removed, colostomy functioning w/ about 750mL of output. D/w surgery     *new pertinent meds: reglan, abx, LR    *labs reviewed; Na WNL; will keep total the same in the PN bag today. Noted Na downtrending - if continues to downtrend may need to increase Na in the bag. K+ still on low end of normal; will increase total in the PN bag again today. Phos WNL; will keep total the same in the bag today. Mg continues to downtrend will add 5mg of Mg back into the PN bag today. Continue to replete lytes outside of PN bag prn.  T bili WNL will c/w trace elements in PN bag. TG WNL (243) will c/w 100g lipids daily. POCTs WNL x 24 hours. corrected Ca WNL, rec'd add 10mEq of Calcium Gluconate outside of PN bag as CAPS is out.     11-19    137  |  108  |  24[H]  ----------------------------<  113[H]  3.6   |  26  |  1.00    Ca    8.9      19 Nov 2024 03:56  Phos  3.5     11-19  Mg     2.3     11-19    TPro  6.9  /  Alb  3.0[L]  /  TBili  0.3  /  DBili  x   /  AST  53[H]  /  ALT  208[H]  /  AlkPhos  103  11-19      BMI: BMI (kg/m2): 37.6 (11-09-24 @ 03:15)  HbA1c:   Glucose: POCT Blood Glucose.: 117 mg/dL (11-18-24 @ 23:43)    BP: 122/65 (11-19-24 @ 07:59) (115/81 - 147/96)Vital Signs Last 24 Hrs  T(C): 36.7 (11-19-24 @ 07:59), Max: 36.8 (11-19-24 @ 00:20)  T(F): 98 (11-19-24 @ 07:59), Max: 98.2 (11-19-24 @ 00:20)  HR: 67 (11-19-24 @ 07:59) (57 - 67)  BP: 122/65 (11-19-24 @ 07:59) (122/65 - 123/69)  BP(mean): --  RR: 18 (11-19-24 @ 07:59) (18 - 18)  SpO2: 98% (11-19-24 @ 07:59) (97% - 98%)      Lipid Panel: Date/Time: 11-15-24 @ 05:05  Cholesterol, Serum: --  LDL Cholesterol Calculated: --  HDL Cholesterol, Serum: --  Total Cholesterol/HDL Ration Measurement: --  Triglycerides, Serum: 243    POCT Blood Glucose.: 117 mg/dL (11-18-24 @ 23:43)  POCT Blood Glucose.: 95 mg/dL (11-18-24 @ 11:23)      *I&O's Detail    18 Nov 2024 07:01  -  19 Nov 2024 07:00  --------------------------------------------------------  IN:  Total IN: 0 mL    OUT:    Bulb (mL): 2 mL    Colostomy (mL): 750 mL    Nasogastric/Oral tube (mL): 180 mL  Total OUT: 932 mL    Total NET: -932 mL        * fluid status:   *BM (+) on .  pt on bowel regimen.  *edema:    *malnutrition:    Estimated Needs: based on Kg (wt from )  Calories: Kcal (Kcal/Kg)  Protein:  g (g/Kg)  Fluids:   mL (mL/Kg)        Weight History:  Daily   Height (cm): 177.8 (11-09-24 @ 03:15)  Weight (kg): 118.8 (11-09-24 @ 03:15)  BMI (kg/m2): 37.6 (11-09-24 @ 03:15)  BSA (m2): 2.34 (11-09-24 @ 03:15)  IBW:    TPN/PPN Recommendations: via  Total Volume:  x 24 hours   g  Amino Acids   g Dextrose   g Lipids 20%  mEq Sodium Chloride  mEq Sodium Acetate  mmol Sodium Phosphate  mEq Potassium Chloride  mEq Potassium Acetate  mmol Potassium Phosphate  mEq Calcium Gluconate  mEq Magnesium Sulfate  200 mg Thiamine  ml Trace Elements  ml MVI    Total Calories            (Meets    %  of  Estimated Energy needs  and    %  Protein needs)   (osmolarity ~)    Additional Recommendations:    1) Daily weights  2) Strict I & O's  3) Daily lyte checks including magnesium and phos  4) Weekly triglycerides/LFT checks  5) POCT q6hrs; maintain 140-180mg/dL  6) if on PN > 6 days, consider placing PICC line to meet 100% of nutr needs    *will continue to monitor and adjust PN prn* Clinical Nutrition PN Follow Up Note    *39 year old male with PMH of recent dx of diverticulitis about four weeks ago, started on ABx as outpatient, but didn't complete the course, presents with cc of abdominal pain for a day in lower abdomen, mild nausea w/o emesis. Passing gas, Last BM yesterday (11/8/24). CT w/ Acute sigmoid diverticulitis w/ tiny focus of adjacent free air and a small 1.0 cm region of loculated fluid. No organized drainable fluid collection. Colorectal surgery following, pending ?possible surgery w/ possible colostomy creation; pending repeat CT scan.   *11/14: PPN Day #1: pt has failed conservative management, now POD3 Catalina's procedure. NGT in place w/ bilious output, almost 2L overnight. Has been NPO x 6 days, meeting <50% ENN. Possible NGT clamp trial today per surgery. Now with ileus as per surgery, plan to initiate PPN today 11/14. Ostomy not functioning. Pt still w/o muscle/ fat wasting. Walking in hallway upon RD visit. Wound/ostomy RN consulted. Pt is at HIGH RISK for refeeding syndrome and PCM  *11/15: seen by [facility]ON, was educated on care of ostomy and dietary restrictions. NGT noted with ~2L of output overnight. Clamp trial considered if NGT output responds to pulling back 10 cm (based on AXR on 11/14) per surgery.  F/U colostomy output (~170 mL). Remains NPO, started chewing gum yesterday to promote ostomy function.  *11/17: PPN Day #4: Remains NPO; POD6 from Catalina's procedure. NGT still with large bilious output overnight, did improve from yesterday however. Clamp trial - 400 cc output per surgery. Ostomy functioning. ANTONELLA drains in place. D/w surgery will continue PPN today. Pt remains at high risk for PCM  *11/18: PPN Day #5: Remains NPO w/ NGT to LWS. Failed clamp trial yesterday (400cc residual) per surgery. NGT still with high output overnight (~2L). Reglan administered for nausea, no vomiting overnight per flowsheet. D/w surgery will c/w PPN today.    PPN initiated 11/14 2/2 ileus, prolonged NPO status    *current status: Pt advanced to FLD diet yesterday for dinner. NGT has been removed, colostomy functioning w/ about 750mL of output. D/w surgery     *new pertinent meds:     *labs reviewed;     11-19    137  |  108  |  24[H]  ----------------------------<  113[H]  3.6   |  26  |  1.00    Ca    8.9      19 Nov 2024 03:56  Phos  3.5     11-19  Mg     2.3     11-19    TPro  6.9  /  Alb  3.0[L]  /  TBili  0.3  /  DBili  x   /  AST  53[H]  /  ALT  208[H]  /  AlkPhos  103  11-19      BMI: BMI (kg/m2): 37.6 (11-09-24 @ 03:15)  HbA1c:   Glucose: POCT Blood Glucose.: 117 mg/dL (11-18-24 @ 23:43)    BP: 122/65 (11-19-24 @ 07:59) (115/81 - 147/96)Vital Signs Last 24 Hrs  T(C): 36.7 (11-19-24 @ 07:59), Max: 36.8 (11-19-24 @ 00:20)  T(F): 98 (11-19-24 @ 07:59), Max: 98.2 (11-19-24 @ 00:20)  HR: 67 (11-19-24 @ 07:59) (57 - 67)  BP: 122/65 (11-19-24 @ 07:59) (122/65 - 123/69)  BP(mean): --  RR: 18 (11-19-24 @ 07:59) (18 - 18)  SpO2: 98% (11-19-24 @ 07:59) (97% - 98%)      Lipid Panel: Date/Time: 11-15-24 @ 05:05  Cholesterol, Serum: --  LDL Cholesterol Calculated: --  HDL Cholesterol, Serum: --  Total Cholesterol/HDL Ration Measurement: --  Triglycerides, Serum: 243    POCT Blood Glucose.: 117 mg/dL (11-18-24 @ 23:43)  POCT Blood Glucose.: 95 mg/dL (11-18-24 @ 11:23)      *I&O's Detail    18 Nov 2024 07:01  -  19 Nov 2024 07:00  --------------------------------------------------------  IN:  Total IN: 0 mL    OUT:    Bulb (mL): 2 mL    Colostomy (mL): 750 mL    Nasogastric/Oral tube (mL): 180 mL  Total OUT: 932 mL    Total NET: -932 mL        * fluid status:   *BM (+) on .  pt on bowel regimen.  *edema:    *malnutrition:    Estimated Needs: based on Kg (wt from )  Calories: Kcal (Kcal/Kg)  Protein:  g (g/Kg)  Fluids:   mL (mL/Kg)        Weight History:  Daily   Height (cm): 177.8 (11-09-24 @ 03:15)  Weight (kg): 118.8 (11-09-24 @ 03:15)  BMI (kg/m2): 37.6 (11-09-24 @ 03:15)  BSA (m2): 2.34 (11-09-24 @ 03:15)  IBW:    TPN/PPN Recommendations: via  Total Volume:  x 24 hours   g  Amino Acids   g Dextrose   g Lipids 20%  mEq Sodium Chloride  mEq Sodium Acetate  mmol Sodium Phosphate  mEq Potassium Chloride  mEq Potassium Acetate  mmol Potassium Phosphate  mEq Calcium Gluconate  mEq Magnesium Sulfate  200 mg Thiamine  ml Trace Elements  ml MVI    Total Calories            (Meets    %  of  Estimated Energy needs  and    %  Protein needs)   (osmolarity ~)    Additional Recommendations:    1) Daily weights  2) Strict I & O's  3) Daily lyte checks including magnesium and phos  4) Weekly triglycerides/LFT checks  5) POCT q6hrs; maintain 140-180mg/dL  6) if on PN > 6 days, consider placing PICC line to meet 100% of nutr needs    *will continue to monitor and adjust PN prn* Clinical Nutrition PN Follow Up Note    *39 year old male with PMH of recent dx of diverticulitis about four weeks ago, started on ABx as outpatient, but didn't complete the course, presents with cc of abdominal pain for a day in lower abdomen, mild nausea w/o emesis. Passing gas, Last BM yesterday (11/8/24). CT w/ Acute sigmoid diverticulitis w/ tiny focus of adjacent free air and a small 1.0 cm region of loculated fluid. No organized drainable fluid collection. Colorectal surgery following, pending ?possible surgery w/ possible colostomy creation; pending repeat CT scan.   *11/14: PPN Day #1: pt has failed conservative management, now POD3 Catalina's procedure. NGT in place w/ bilious output, almost 2L overnight. Has been NPO x 6 days, meeting <50% ENN. Possible NGT clamp trial today per surgery. Now with ileus as per surgery, plan to initiate PPN today 11/14. Ostomy not functioning. Pt still w/o muscle/ fat wasting. Walking in hallway upon RD visit. Wound/ostomy RN consulted. Pt is at HIGH RISK for refeeding syndrome and PCM  *11/15: seen by [facility]ON, was educated on care of ostomy and dietary restrictions. NGT noted with ~2L of output overnight. Clamp trial considered if NGT output responds to pulling back 10 cm (based on AXR on 11/14) per surgery.  F/U colostomy output (~170 mL). Remains NPO, started chewing gum yesterday to promote ostomy function.  *11/17: PPN Day #4: Remains NPO; POD6 from Catalina's procedure. NGT still with large bilious output overnight, did improve from yesterday however. Clamp trial - 400 cc output per surgery. Ostomy functioning. ANTONELLA drains in place. D/w surgery will continue PPN today. Pt remains at high risk for PCM  *11/18: PPN Day #5: Remains NPO w/ NGT to LWS. Failed clamp trial yesterday (400cc residual) per surgery. NGT still with high output overnight (~2L). Reglan administered for nausea, no vomiting overnight per flowsheet. D/w surgery will c/w PPN today.    *current status: Pt advanced to FLD diet yesterday for dinner. NGT has been removed, colostomy functioning w/ about 750mL of output. Per surgery likely upgrading to low fiber diet. today. D/w surgery today will dc PPN.     *new pertinent meds:     *labs reviewed;     11-19    137  |  108  |  24[H]  ----------------------------<  113[H]  3.6   |  26  |  1.00    Ca    8.9      19 Nov 2024 03:56  Phos  3.5     11-19  Mg     2.3     11-19    TPro  6.9  /  Alb  3.0[L]  /  TBili  0.3  /  DBili  x   /  AST  53[H]  /  ALT  208[H]  /  AlkPhos  103  11-19      BMI: BMI (kg/m2): 37.6 (11-09-24 @ 03:15)  Glucose: POCT Blood Glucose.: 117 mg/dL (11-18-24 @ 23:43)    BP: 122/65 (11-19-24 @ 07:59) (115/81 - 147/96)Vital Signs Last 24 Hrs  T(C): 36.7 (11-19-24 @ 07:59), Max: 36.8 (11-19-24 @ 00:20)  T(F): 98 (11-19-24 @ 07:59), Max: 98.2 (11-19-24 @ 00:20)  HR: 67 (11-19-24 @ 07:59) (57 - 67)  BP: 122/65 (11-19-24 @ 07:59) (122/65 - 123/69)  RR: 18 (11-19-24 @ 07:59) (18 - 18)  SpO2: 98% (11-19-24 @ 07:59) (97% - 98%)    Lipid Panel: Date/Time: 11-15-24 @ 05:05  Triglycerides, Serum: 243    POCT Blood Glucose.: 117 mg/dL (11-18-24 @ 23:43)  POCT Blood Glucose.: 95 mg/dL (11-18-24 @ 11:23)    *I&O's Detail    18 Nov 2024 07:01  -  19 Nov 2024 07:00  --------------------------------------------------------  IN:  Total IN: 0 mL    OUT:    Bulb (mL): 2 mL    Colostomy (mL): 750 mL    Nasogastric/Oral tube (mL): 180 mL  Total OUT: 932 mL    Total NET: -932 mL    * fluid status: negative; however PPN not doc'd  *BM small, loose, dark brown in ostomy bag on 11/19.  pt NOT on bowel regimen.  *edema: none doc'd    *malnutrition: Pt does not met criteria at this time; remains at high risk for PCM    Estimated Needs: based on 113.3 Kg (wt from  bed scale 11/11)  Calories: 2040- 2500 Kcal (18- 22 Kcal/Kg)  Protein: 136- 159 g (1.2- 1.4 g/Kg)  Fluids: 2040- 2500 mL (18- 22 mL/Kg)    Weight History:  Height (cm): 177.8 (11-09-24 @ 03:15)  Weight (kg): 118.8 (11-09-24 @ 03:15)  BMI (kg/m2): 37.6 (11-09-24 @ 03:15)  BSA (m2): 2.34 (11-09-24 @ 03:15)    Additional Recommendations:    1) Advance to low fiber diet as soon as medically feasible to optimize nutritional status; F/U for ostomy MNT when diet advanced  2) Premier protein shake BID to optimize nutritional needs (provides 160 kcal, 30 g protein/ shake)   3) MVI w/ minerals daily to ensure 100% RDA met   4) Monitor bowel movements, if no BM for >3 days, consider implementing bowel regimen.   5) Monitor daily lytes/min and replete prn   6) Obtain weekly wt to track/trend changes   7) Encourage protein-rich foods, maximize food preferences     RD will continue to monitor PO intake, labs, hydration, and wt prn.   Ina Branch, PhD, MS, [phone]

## 2024-11-20 ENCOUNTER — TRANSCRIPTION ENCOUNTER (OUTPATIENT)
Age: 39
End: 2024-11-20

## 2024-11-20 VITALS
OXYGEN SATURATION: 97 % | DIASTOLIC BLOOD PRESSURE: 59 MMHG | TEMPERATURE: 98 F | HEART RATE: 58 BPM | RESPIRATION RATE: 18 BRPM | SYSTOLIC BLOOD PRESSURE: 107 MMHG

## 2024-11-20 LAB
ALBUMIN SERPL ELPH-MCNC: 2.9 G/DL — LOW (ref 3.3–5)
ALP SERPL-CCNC: 95 U/L — SIGNIFICANT CHANGE UP (ref 40–120)
ALT FLD-CCNC: 153 U/L — HIGH (ref 12–78)
ANION GAP SERPL CALC-SCNC: 3 MMOL/L — LOW (ref 5–17)
AST SERPL-CCNC: 32 U/L — SIGNIFICANT CHANGE UP (ref 15–37)
BILIRUB SERPL-MCNC: 0.4 MG/DL — SIGNIFICANT CHANGE UP (ref 0.2–1.2)
BUN SERPL-MCNC: 17 MG/DL — SIGNIFICANT CHANGE UP (ref 7–23)
CALCIUM SERPL-MCNC: 9.3 MG/DL — SIGNIFICANT CHANGE UP (ref 8.5–10.1)
CHLORIDE SERPL-SCNC: 109 MMOL/L — HIGH (ref 96–108)
CO2 SERPL-SCNC: 25 MMOL/L — SIGNIFICANT CHANGE UP (ref 22–31)
CREAT SERPL-MCNC: 0.98 MG/DL — SIGNIFICANT CHANGE UP (ref 0.5–1.3)
EGFR: 102 ML/MIN/1.73M2 — SIGNIFICANT CHANGE UP
GLUCOSE SERPL-MCNC: 100 MG/DL — HIGH (ref 70–99)
HCT VFR BLD CALC: 38.5 % — LOW (ref 39–50)
HGB BLD-MCNC: 12.9 G/DL — LOW (ref 13–17)
MAGNESIUM SERPL-MCNC: 2.3 MG/DL — SIGNIFICANT CHANGE UP (ref 1.6–2.6)
MCHC RBC-ENTMCNC: 29.4 PG — SIGNIFICANT CHANGE UP (ref 27–34)
MCHC RBC-ENTMCNC: 33.5 G/DL — SIGNIFICANT CHANGE UP (ref 32–36)
MCV RBC AUTO: 87.7 FL — SIGNIFICANT CHANGE UP (ref 80–100)
PHOSPHATE SERPL-MCNC: 3.4 MG/DL — SIGNIFICANT CHANGE UP (ref 2.5–4.5)
PLATELET # BLD AUTO: 479 K/UL — HIGH (ref 150–400)
POTASSIUM SERPL-MCNC: 3.8 MMOL/L — SIGNIFICANT CHANGE UP (ref 3.5–5.3)
POTASSIUM SERPL-SCNC: 3.8 MMOL/L — SIGNIFICANT CHANGE UP (ref 3.5–5.3)
PROT SERPL-MCNC: 6.9 GM/DL — SIGNIFICANT CHANGE UP (ref 6–8.3)
RBC # BLD: 4.39 M/UL — SIGNIFICANT CHANGE UP (ref 4.2–5.8)
RBC # FLD: 13.6 % — SIGNIFICANT CHANGE UP (ref 10.3–14.5)
SODIUM SERPL-SCNC: 137 MMOL/L — SIGNIFICANT CHANGE UP (ref 135–145)
WBC # BLD: 10.69 K/UL — HIGH (ref 3.8–10.5)
WBC # FLD AUTO: 10.69 K/UL — HIGH (ref 3.8–10.5)

## 2024-11-20 RX ORDER — OXYCODONE AND ACETAMINOPHEN 5; 325 MG/1; MG/1
1 TABLET ORAL
Qty: 20 | Refills: 0
Start: 2024-11-20

## 2024-11-20 RX ORDER — AMOXICILLIN/POTASSIUM CLAV 250-125 MG
875 TABLET ORAL
Qty: 10 | Refills: 0
Start: 2024-11-20 | End: 2024-11-24

## 2024-11-20 RX ORDER — AMOXICILLIN/POTASSIUM CLAV 250-125 MG
875 TABLET ORAL
Refills: 0
Start: 2024-11-20 | End: 2024-11-24

## 2024-11-20 RX ADMIN — PIPERACILLIN SODIUM AND TAZOBACTAM SODIUM 25 GRAM(S): 4; .5 INJECTION, POWDER, LYOPHILIZED, FOR SOLUTION INTRAVENOUS at 05:18

## 2024-11-20 RX ADMIN — Medication 5000 UNIT(S): at 05:18

## 2024-11-20 RX ADMIN — PANTOPRAZOLE SODIUM 40 MILLIGRAM(S): 40 TABLET, DELAYED RELEASE ORAL at 10:17

## 2024-11-20 RX ADMIN — METOCLOPRAMIDE HYDROCHLORIDE 10 MILLIGRAM(S): 10 TABLET ORAL at 05:18

## 2024-11-20 NOTE — PROGRESS NOTE ADULT - SUBJECTIVE AND OBJECTIVE BOX
Patient seen and examined at the bedside this AM.  NGT removed yesterday on FLD tolerating. Pain controlled, ambulating, ostomy pink + Gas and output. on Reglan scheduled.       PHYSICAL EXAM   GENERAL: NAD, well developed, obese  HEAD: Atraumatic, normocephalic  EYES: EOMI, PERRLA, conjunctiva and sclera clear  ENT: moist mucous membrane,   NECK: supple, No JVD, midline trachea  CHEST/LUNG: No increased WOB, symmetric excursions  Heart: RRR ppp, no peripheral edema  ABDOMEN: Round, mildly distended, , soft, nontender, colostomy pink with mild gas and bilious output in bag, ANTONELLA in place serous   EXTREMITIES: Brisk cap refill. no clubbing or cyanosis  NERVOUS SYSTEM: AOx4, speech clear, no neuro-deficits  MSK: full ROM, no deformities  SKIN: warm to touch, no rash or lesions           Vitals:  T(C): 36.7 ( @ 07:59), Max: 36.8 ( @ 00:20)  HR: 67 ( 07:59) (57 - 67)  BP: 122/65 ( @ 07:59) (122/65 - 123/69)  RR: 18 ( @ 07:59) (18 - 18)  SpO2: 98% ( @ 07:59) (97% - 98%)     @ 07:01  -   @ 07:00  --------------------------------------------------------  IN:  Total IN: 0 mL    OUT:    Bulb (mL): 2 mL    Colostomy (mL): 750 mL    Nasogastric/Oral tube (mL): 180 mL  Total OUT: 932 mL    Total NET: -932 mL             @ 03:56                    12.5  CBC: 12.25>)-------(<522                     36.6                 137 | 108 | 24    CMP:  ----------------------< 113               3.6 | 26 | 1.00                      Ca:8.9  Phos:3.5  M.3               0.3|      |53        LFTs:  ------|103|-----             -|      |-  11-18 @ 04:16                    13.2  CBC: 11.51>)-------(<551                     38.7                 138 | 108 | 28    CMP:  ----------------------< 111               3.5 | 26 | 1.00                      Ca:8.9  Phos:3.3  M.4               0.4|      |98        LFTs:  ------|110|-----             -|      |-      Current Inpatient Medications:  acetaminophen   IVPB .. 1000 milliGRAM(s) IV Intermittent once  enalaprilat Injectable 1.25 milliGRAM(s) IV Push every 6 hours PRN  heparin   Injectable 5000 Unit(s) SubCutaneous every 8 hours  HYDROmorphone  Injectable 0.5 milliGRAM(s) IV Push every 4 hours PRN  lipid, fat emulsion (Fish Oil and Plant Based) 20% Infusion 0.8418 Gm/kG/Day (41.7 mL/Hr) IV Continuous <Continuous>  metoclopramide Injectable 10 milliGRAM(s) IV Push every 8 hours  ondansetron Injectable 4 milliGRAM(s) IV Push every 6 hours PRN  pantoprazole  Injectable 40 milliGRAM(s) IV Push daily  Parenteral Nutrition - Adult 1 Each (100 mL/Hr) TPN Continuous <Continuous>  piperacillin/tazobactam IVPB.. 3.375 Gram(s) IV Intermittent every 8 hours  potassium chloride  10 mEq/100 mL IVPB 10 milliEquivalent(s) IV Intermittent every 1 hour        
Patient seen and examined at the bedside this AM.  Pain better. Failed NGT clamp trial yesterday. 400cc residual  NGT in place. Ostomy functioning.  ANTONELLA drain in place producing serous fluid.  AVSS       PHYSICAL EXAM   GENERAL: NAD, well developed, obese  HEAD: Atraumatic, normocephalic  EYES: EOMI, PERRLA, conjunctiva and sclera clear  ENT: moist mucous membrane, NGT with bilious output in canister & tubing  NECK: supple, No JVD, midline trachea  CHEST/LUNG: No increased WOB, symmetric excursions  Heart: RRR ppp, no peripheral edema  ABDOMEN: Round, distended, tympanic, soft, nontender, colostomy pink with mild gas and bilious output in bag, ANTONELLA in place  EXTREMITIES: Brisk cap refill. no clubbing or cyanosis  NERVOUS SYSTEM: AOx4, speech clear, no neuro-deficits  MSK: full ROM, no deformities  SKIN: warm to touch, no rash or lesions       Chief complaint:      PMHx:  Deafness        PSHx:  No significant past surgical history        FHx:  No pertinent family history in first degree relatives        Vitals:  T(C): 37 ( @ 00:22), Max: 37.1 ( @ 16:00)  HR: 55 ( @ 00:22) (55 - 63)  BP: 139/84 ( @ 00:22) (133/79 - 147/96)  RR: 17 ( @ 00:22) (16 - 17)  SpO2: 97% ( @ 00:22) (96% - 97%)      I&Os     @ 07:01  -   @ 07:00  --------------------------------------------------------  IN:    Fat Emulsion (Fish Oil &amp; Plant Based) 20% Infusion: 291.9 mL    PPN (Peripheral Parenteral Nutrition): 700 mL  Total IN: 991.9 mL    OUT:    Bulb (mL): 15 mL    Colostomy (mL): 400 mL    Nasogastric/Oral tube (mL): 2125 mL  Total OUT: 2540 mL    Total NET: -1548.1 mL        .    Labs:   @ 04:16                    13.2  CBC: 11.51>)-------(<551                     38.7                 138 | 108 | 28    CMP:  ----------------------< 111               3.5 | 26 | 1.00                      Ca:8.9  Phos:3.3  M.4               0.4|      |98        LFTs:  ------|110|-----             -|      |-   @ 04:24                    13.2  CBC: 10.06>)-------(<512                     38.8                 140 | 110 | 27    CMP:  ----------------------< 114               3.6 | 25 | 0.98                      Ca:8.7  Phos:3.6  M.5               0.4|      |111        LFTs:  ------|109|-----             -|      |-        Cultures:        Current Inpatient Medications:  acetaminophen   IVPB .. 1000 milliGRAM(s) IV Intermittent once  enalaprilat Injectable 1.25 milliGRAM(s) IV Push every 6 hours PRN  heparin   Injectable 5000 Unit(s) SubCutaneous every 8 hours  HYDROmorphone  Injectable 0.5 milliGRAM(s) IV Push every 4 hours PRN  lipid, fat emulsion (Fish Oil and Plant Based) 20% Infusion 0.842 Gm/kG/Day (41.7 mL/Hr) IV Continuous <Continuous>  metoclopramide Injectable 10 milliGRAM(s) IV Push every 8 hours  ondansetron Injectable 4 milliGRAM(s) IV Push every 6 hours PRN  Parenteral Nutrition - Adult 1 Each (100 mL/Hr) TPN Continuous <Continuous>  potassium chloride  10 mEq/100 mL IVPB 10 milliEquivalent(s) IV Intermittent every 1 hour      
Patient seen this AM at the bedside.  Osotmy still with no function Ng kept since bilious   kept m fluids , cr improved, making adequate uop, metcalf out and voided  No other complains at this time         Physical Exam:  General:  Aox3. NAD  Chest: Normal respiratory effort  Heart: RRR  Abdomen: Soft, ND, TTP on lower abdomen, no R/G , ostomy w no function,  ANTONELLA s/s   Neuro/Psych: No localized deficits.   Skin: Normal, no rashes, no lesions noted.   Extremities: Warm, well perfused, no edema, Pulses intact        Vitals:  T(C): 36.9 ( @ 07:16), Max: 37.4 ( @ 16:00)  HR: 89 ( 07:16) (89 - 97)  BP: 123/74 ( @ 07:16) (123/74 - 136/79)  RR: 18 ( 07:16) (18 - 18)  SpO2: 93% ( 07:16) (93% - 97%)     @ 07:01  -   @ 07:00  --------------------------------------------------------  IN:  Total IN: 0 mL    OUT:    Bulb (mL): 105 mL    Nasogastric/Oral tube (mL): 250 mL    Ureteral Catheter (mL): 700 mL    Voided (mL): 1100 mL  Total OUT: 2155 mL    Total NET: -2155 mL       @ 07:01  -   @ 10:42  --------------------------------------------------------  IN:  Total IN: 0 mL    OUT:    Bulb (mL): 90 mL  Total OUT: 90 mL    Total NET: -90 mL             @ 08:26                    11.5  CBC: 9.87>)-------(<345                     34.7                 144 | 116 | 12    CMP:  ----------------------< 113               3.7 | 26 | 0.95                      Ca:8.1  Phos:1.2  M.6               0.4|      |12        LFTs:  ------|39|-----             -|      |-   @ 08:32                    13.2  CBC: 13.87>)-------(<328                     38.7                 138 | 107 | 25    CMP:  ----------------------< 135               3.4 | 27 | 1.44                      Ca:8.1  Phos:2.4  M.2               0.7|      |10        LFTs:  ------|48|-----             -|      |-      Culture - Fungal, Body Fluid (collected 24 @ 14:22)  Source: Body Fluid  Preliminary Report (24 @ 07:22):    No growth    Culture - Acid Fast - Body Fluid w/Smear (collected 24 @ 14:22)  Source: Body Fluid    Culture - Body Fluid with Gram Stain (collected 24 @ 14:22)  Source: Body Fluid  Gram Stain (24 @ 02:03):    polymorphonuclear leukocytes seen    No organisms seen    by cytocentrifuge  Preliminary Report (24 @ 19:58):    No growth    Urinalysis with Rflx Culture (collected 24 @ 06:06)    Culture - Blood (collected 24 @ 06:06)  Source: .Blood BLOOD  Preliminary Report (24 @ 09:01):    No growth at 4 days    Culture - Blood (collected 24 @ 05:32)  Source: .Blood BLOOD  Preliminary Report (24 @ 09:01):    No growth at 4 days      Current Inpatient Medications:  acetaminophen   IVPB .. 1000 milliGRAM(s) IV Intermittent once  dextrose 5% + sodium chloride 0.9% with potassium chloride 20 mEq/L 1000 milliLiter(s) (125 mL/Hr) IV Continuous <Continuous>  enalaprilat Injectable 1.25 milliGRAM(s) IV Push every 6 hours PRN  heparin   Injectable 5000 Unit(s) SubCutaneous every 8 hours  HYDROmorphone  Injectable 0.5 milliGRAM(s) IV Push every 4 hours PRN  ondansetron Injectable 4 milliGRAM(s) IV Push every 6 hours PRN  piperacillin/tazobactam IVPB.. 3.375 Gram(s) IV Intermittent every 8 hours  potassium chloride  10 mEq/100 mL IVPB 10 milliEquivalent(s) IV Intermittent every 1 hour                
Patient seen this AM at the bedside.  Patient underwent urgent rpabhakar's procedure yesterday.  Tolerated surgery well. Ostomy still not functioning  pain fairly controlled. taking sips of water  No other complains at this time         Physical Exam:  General:  Aox3. NAD  Chest: Normal respiratory effort  Heart: RRR  Abdomen: Soft, ND, TTP on lower abdomen, no R/G   Neuro/Psych: No localized deficits. Normal spech, normal tone  Skin: Normal, no rashes, no lesions noted.   Extremities: Warm, well perfused, no edema, Pulses intact        Vital Signs Last 24 Hrs  T(C): 37.6 (10 Nov 2024 20:19), Max: 37.6 (10 Nov 2024 20:19)  T(F): 99.7 (10 Nov 2024 20:19), Max: 99.7 (10 Nov 2024 20:19)  HR: 95 (10 Nov 2024 20:19) (90 - 95)  BP: 139/77 (10 Nov 2024 20:19) (130/68 - 139/77)  BP(mean): --  RR: 18 (10 Nov 2024 20:19) (18 - 18)  SpO2: 94% (10 Nov 2024 20:19) (94% - 97%)    Parameters below as of 10 Nov 2024 20:19  Patient On (Oxygen Delivery Method): room air        I&O's Summary          LABS:                        14.3   18.39 )-----------( 286      ( 10 Nov 2024 09:17 )             42.8     11-10    137  |  103  |  19  ----------------------------<  112[H]  3.3[L]   |  28  |  1.19    Ca    9.6      10 Nov 2024 09:17  Phos  2.9     11-10  Mg     2.1     11-10    TPro  7.9  /  Alb  3.9  /  TBili  0.8  /  DBili  x   /  AST  13[L]  /  ALT  38  /  AlkPhos  71  11-09      Urinalysis Basic - ( 10 Nov 2024 09:17 )    Color: x / Appearance: x / SG: x / pH: x  Gluc: 112 mg/dL / Ketone: x  / Bili: x / Urobili: x   Blood: x / Protein: x / Nitrite: x   Leuk Esterase: x / RBC: x / WBC x   Sq Epi: x / Non Sq Epi: x / Bacteria: x      CAPILLARY BLOOD GLUCOSE        LIVER FUNCTIONS - ( 09 Nov 2024 05:32 )  Alb: 3.9 g/dL / Pro: 7.9 gm/dL / ALK PHOS: 71 U/L / ALT: 38 U/L / AST: 13 U/L / GGT: x             Cultures:  Culture Results:   No growth at 24 hours (11-09 @ 06:06)  Culture Results:   No growth at 24 hours (11-09 @ 05:32)      RADIOLOGY & ADDITIONAL STUDIES:        
Patient seen and examined at the bedside this AM  No BF, slightly distended, NGT producing bilious output,        PAST MEDICAL & SURGICAL HISTORY:  Deafness      No significant past surgical history          MEDICATIONS  (STANDING):  acetaminophen   IVPB .. 1000 milliGRAM(s) IV Intermittent once  dextrose 5% + sodium chloride 0.9% with potassium chloride 20 mEq/L 1000 milliLiter(s) (125 mL/Hr) IV Continuous <Continuous>  heparin   Injectable 5000 Unit(s) SubCutaneous every 8 hours  piperacillin/tazobactam IVPB.. 3.375 Gram(s) IV Intermittent every 8 hours  potassium chloride  10 mEq/100 mL IVPB 10 milliEquivalent(s) IV Intermittent every 1 hour  potassium phosphate IVPB 15 milliMole(s) IV Intermittent once    MEDICATIONS  (PRN):  enalaprilat Injectable 1.25 milliGRAM(s) IV Push every 6 hours PRN hypertension  HYDROmorphone  Injectable 0.5 milliGRAM(s) IV Push every 4 hours PRN Severe Pain (7 - 10)  ondansetron Injectable 4 milliGRAM(s) IV Push every 6 hours PRN Nausea      Allergies    No Known Allergies    Intolerances        SOCIAL HISTORY:    FAMILY HISTORY:        Physical Exam:  General:  Aox3. NAD  Chest: Normal respiratory effort  Heart: RRR  Abdomen: Soft, distended and tympanitic, TTP on lower abdomen, no R/G , ostomy w no function,  ANTONELLA s/s   Neuro/Psych: No localized deficits.   Skin: Normal, no rashes, no lesions noted.   Extremities: Warm, well perfused, no edema, Pulses intact        Vital Signs Last 24 Hrs  T(C): 37.1 (14 Nov 2024 00:05), Max: 37.1 (13 Nov 2024 16:00)  T(F): 98.8 (14 Nov 2024 00:05), Max: 98.8 (14 Nov 2024 00:05)  HR: 84 (14 Nov 2024 00:05) (84 - 89)  BP: 143/90 (14 Nov 2024 00:05) (123/74 - 143/90)  BP(mean): --  RR: 18 (14 Nov 2024 00:05) (18 - 18)  SpO2: 94% (14 Nov 2024 00:05) (93% - 94%)    Parameters below as of 14 Nov 2024 00:05  Patient On (Oxygen Delivery Method): room air        I&O's Summary    12 Nov 2024 07:01  -  13 Nov 2024 07:00  --------------------------------------------------------  IN: 0 mL / OUT: 2155 mL / NET: -2155 mL    13 Nov 2024 07:01  -  14 Nov 2024 03:18  --------------------------------------------------------  IN: 0 mL / OUT: 1745 mL / NET: -1745 mL            LABS:                        11.5   9.87  )-----------( 345      ( 13 Nov 2024 08:26 )             34.7     11-13    144  |  116[H]  |  12  ----------------------------<  113[H]  3.7   |  26  |  0.95    Ca    8.1[L]      13 Nov 2024 08:26  Phos  1.2     11-13  Mg     2.6     11-13    TPro  6.2  /  Alb  2.4[L]  /  TBili  0.4  /  DBili  x   /  AST  12[L]  /  ALT  18  /  AlkPhos  39[L]  11-13      Urinalysis Basic - ( 13 Nov 2024 08:26 )    Color: x / Appearance: x / SG: x / pH: x  Gluc: 113 mg/dL / Ketone: x  / Bili: x / Urobili: x   Blood: x / Protein: x / Nitrite: x   Leuk Esterase: x / RBC: x / WBC x   Sq Epi: x / Non Sq Epi: x / Bacteria: x      CAPILLARY BLOOD GLUCOSE        LIVER FUNCTIONS - ( 13 Nov 2024 08:26 )  Alb: 2.4 g/dL / Pro: 6.2 gm/dL / ALK PHOS: 39 U/L / ALT: 18 U/L / AST: 12 U/L / GGT: x             Cultures:      RADIOLOGY & ADDITIONAL STUDIES:        
Patient seen and examined at the bedside this AM.  Tolerating diet with good bowl function.     HPI:        PAST MEDICAL & SURGICAL HISTORY:  Deafness      No significant past surgical history          MEDICATIONS  (STANDING):  acetaminophen   IVPB .. 1000 milliGRAM(s) IV Intermittent once  heparin   Injectable 5000 Unit(s) SubCutaneous every 8 hours  metoclopramide Injectable 10 milliGRAM(s) IV Push every 8 hours  pantoprazole  Injectable 40 milliGRAM(s) IV Push daily  Parenteral Nutrition - Adult 1 Each (100 mL/Hr) TPN Continuous <Continuous>  piperacillin/tazobactam IVPB.. 3.375 Gram(s) IV Intermittent every 8 hours  potassium chloride  10 mEq/100 mL IVPB 10 milliEquivalent(s) IV Intermittent every 1 hour    MEDICATIONS  (PRN):  enalaprilat Injectable 1.25 milliGRAM(s) IV Push every 6 hours PRN hypertension  HYDROmorphone  Injectable 0.5 milliGRAM(s) IV Push every 4 hours PRN Severe Pain (7 - 10)  ondansetron Injectable 4 milliGRAM(s) IV Push every 6 hours PRN Nausea      Allergies    No Known Allergies    Intolerances        SOCIAL HISTORY:    FAMILY HISTORY:      PHYSICAL EXAM   GENERAL: NAD, well developed, obese  HEAD: Atraumatic, normocephalic  EYES: EOMI, PERRLA, conjunctiva and sclera clear  ENT: moist mucous membrane,   NECK: supple, No JVD, midline trachea  CHEST/LUNG: No increased WOB, symmetric excursions  Heart: RRR ppp, no peripheral edema  ABDOMEN: Round, mildly distended, , soft, nontender, colostomy pink with mild gas and bilious output in bag,  EXTREMITIES: Brisk cap refill. no clubbing or cyanosis  NERVOUS SYSTEM: AOx4, speech clear, no neuro-deficits  MSK: full ROM, no deformities  SKIN: warm to touch, no rash or lesions        Vital Signs Last 24 Hrs  T(C): 37 (20 Nov 2024 00:00), Max: 37.3 (19 Nov 2024 16:11)  T(F): 98.6 (20 Nov 2024 00:00), Max: 99.1 (19 Nov 2024 16:11)  HR: 64 (20 Nov 2024 00:00) (64 - 71)  BP: 104/61 (20 Nov 2024 00:00) (104/61 - 122/65)  BP(mean): --  RR: 16 (20 Nov 2024 00:00) (16 - 18)  SpO2: 98% (20 Nov 2024 00:00) (98% - 99%)    Parameters below as of 20 Nov 2024 00:00  Patient On (Oxygen Delivery Method): room air        I&O's Summary    18 Nov 2024 07:01  -  19 Nov 2024 07:00  --------------------------------------------------------  IN: 0 mL / OUT: 932 mL / NET: -932 mL    19 Nov 2024 07:01  -  20 Nov 2024 02:06  --------------------------------------------------------  IN: 0 mL / OUT: 500 mL / NET: -500 mL            LABS:                        12.5   12.25 )-----------( 522      ( 19 Nov 2024 03:56 )             36.6     11-19    137  |  108  |  24[H]  ----------------------------<  113[H]  3.6   |  26  |  1.00    Ca    8.9      19 Nov 2024 03:56  Phos  3.5     11-19  Mg     2.3     11-19    TPro  6.9  /  Alb  3.0[L]  /  TBili  0.3  /  DBili  x   /  AST  53[H]  /  ALT  208[H]  /  AlkPhos  103  11-19      Urinalysis Basic - ( 19 Nov 2024 03:56 )    Color: x / Appearance: x / SG: x / pH: x  Gluc: 113 mg/dL / Ketone: x  / Bili: x / Urobili: x   Blood: x / Protein: x / Nitrite: x   Leuk Esterase: x / RBC: x / WBC x   Sq Epi: x / Non Sq Epi: x / Bacteria: x      CAPILLARY BLOOD GLUCOSE      POCT Blood Glucose.: 102 mg/dL (19 Nov 2024 18:22)  POCT Blood Glucose.: 114 mg/dL (19 Nov 2024 12:54)    LIVER FUNCTIONS - ( 19 Nov 2024 03:56 )  Alb: 3.0 g/dL / Pro: 6.9 gm/dL / ALK PHOS: 103 U/L / ALT: 208 U/L / AST: 53 U/L / GGT: x             Cultures:      RADIOLOGY & ADDITIONAL STUDIES:        
Patient sen and examined at the bedside this AM.  NTG in place. Ostomy functioning.  ANTONELLA drain in place producing serous fluid.  AVSS        PAST MEDICAL & SURGICAL HISTORY:  Deafness      No significant past surgical history          MEDICATIONS  (STANDING):  acetaminophen   IVPB .. 1000 milliGRAM(s) IV Intermittent once  heparin   Injectable 5000 Unit(s) SubCutaneous every 8 hours  lipid, fat emulsion (Fish Oil and Plant Based) 20% Infusion 0.842 Gm/kG/Day (41.67 mL/Hr) IV Continuous <Continuous>  Parenteral Nutrition - Adult 1 Each (92 mL/Hr) TPN Continuous <Continuous>  Parenteral Nutrition - Adult 1 Each (100 mL/Hr) TPN Continuous <Continuous>  potassium chloride  10 mEq/100 mL IVPB 10 milliEquivalent(s) IV Intermittent every 1 hour    MEDICATIONS  (PRN):  enalaprilat Injectable 1.25 milliGRAM(s) IV Push every 6 hours PRN hypertension  HYDROmorphone  Injectable 0.5 milliGRAM(s) IV Push every 4 hours PRN Severe Pain (7 - 10)  ondansetron Injectable 4 milliGRAM(s) IV Push every 6 hours PRN Nausea      Allergies    No Known Allergies    Intolerances        SOCIAL HISTORY:    FAMILY HISTORY:      PHYSICAL EXAM   GENERAL: NAD, well developed, obese  HEAD: Atraumatic, normocephalic  EYES: EOMI, PERRLA, conjunctiva and sclera clear  ENT: moist mucous membrane, NGT with bilious output in canister & tubing  NECK: supple, No JVD, midline trachea  CHEST/LUNG: No increased WOB, symmetric excursions  Heart: RRR ppp, no peripheral edema  ABDOMEN: Round, distended, tympanic, soft, nontender, colostomy pink with mild gas and bilious output in bag, ANTONELLA in place  EXTREMITIES: Brisk cap refill. no clubbing or cyanosis  NERVOUS SYSTEM: AOx4, speech clear, no neuro-deficits  MSK: full ROM, no deformities  SKIN: warm to touch, no rash or lesions        Vital Signs Last 24 Hrs  T(C): 37 (17 Nov 2024 00:25), Max: 37 (17 Nov 2024 00:25)  T(F): 98.6 (17 Nov 2024 00:25), Max: 98.6 (17 Nov 2024 00:25)  HR: 54 (17 Nov 2024 00:25) (53 - 57)  BP: 136/85 (17 Nov 2024 00:25) (132/76 - 140/85)  BP(mean): 100 (16 Nov 2024 16:04) (100 - 100)  RR: 16 (17 Nov 2024 00:25) (16 - 16)  SpO2: 98% (17 Nov 2024 00:25) (96% - 98%)    Parameters below as of 17 Nov 2024 00:25  Patient On (Oxygen Delivery Method): room air        I&O's Summary    15 Nov 2024 07:01  -  16 Nov 2024 07:00  --------------------------------------------------------  IN: 1104 mL / OUT: 3360 mL / NET: -2256 mL    16 Nov 2024 07:01  -  17 Nov 2024 02:42  --------------------------------------------------------  IN: 1850 mL / OUT: 1625 mL / NET: 225 mL            LABS:                        12.7   9.16  )-----------( 477      ( 16 Nov 2024 04:20 )             37.7     11-16    142  |  111[H]  |  26[H]  ----------------------------<  110[H]  3.8   |  28  |  1.12    Ca    8.6      16 Nov 2024 04:20  Phos  3.9     11-16  Mg     2.6     11-16    TPro  6.7  /  Alb  2.6[L]  /  TBili  0.6  /  DBili  x   /  AST  83[H]  /  ALT  190[H]  /  AlkPhos  104  11-16      Urinalysis Basic - ( 16 Nov 2024 04:20 )    Color: x / Appearance: x / SG: x / pH: x  Gluc: 110 mg/dL / Ketone: x  / Bili: x / Urobili: x   Blood: x / Protein: x / Nitrite: x   Leuk Esterase: x / RBC: x / WBC x   Sq Epi: x / Non Sq Epi: x / Bacteria: x      CAPILLARY BLOOD GLUCOSE      POCT Blood Glucose.: 97 mg/dL (16 Nov 2024 23:51)  POCT Blood Glucose.: 88 mg/dL (16 Nov 2024 18:08)  POCT Blood Glucose.: 97 mg/dL (16 Nov 2024 11:57)    LIVER FUNCTIONS - ( 16 Nov 2024 04:20 )  Alb: 2.6 g/dL / Pro: 6.7 gm/dL / ALK PHOS: 104 U/L / ALT: 190 U/L / AST: 83 U/L / GGT: x             Cultures:      RADIOLOGY & ADDITIONAL STUDIES:        
Patient seen this AM at the bedside.  Patient is passing liquid stool. Reports improving pain.      PAST MEDICAL & SURGICAL HISTORY:  Deafness      No significant past surgical history          MEDICATIONS  (STANDING):  acetaminophen   IVPB .. 1000 milliGRAM(s) IV Intermittent once  enoxaparin Injectable 40 milliGRAM(s) SubCutaneous every 12 hours  losartan 100 milliGRAM(s) Oral daily  piperacillin/tazobactam IVPB.. 3.375 Gram(s) IV Intermittent every 8 hours  sodium chloride 0.9%. 1000 milliLiter(s) (100 mL/Hr) IV Continuous <Continuous>    MEDICATIONS  (PRN):  acetaminophen   IVPB .. 1000 milliGRAM(s) IV Intermittent every 6 hours PRN Temp greater or equal to 38C (100.4F), Mild Pain (1 - 3)  HYDROmorphone  Injectable 0.5 milliGRAM(s) IV Push every 4 hours PRN Severe Pain (7 - 10)  ondansetron Injectable 4 milliGRAM(s) IV Push every 6 hours PRN Nausea      Allergies    No Known Allergies    Intolerances        SOCIAL HISTORY:    FAMILY HISTORY:      Physical Exam:  General:  Aox3. NAD  Chest: Normal respiratory effort  Heart: RRR  Abdomen: Soft, ND, TTP on lower abdomen, no R/G   Neuro/Psych: No localized deficits. Normal spech, normal tone  Skin: Normal, no rashes, no lesions noted.   Extremities: Warm, well perfused, no edema, Pulses intact        Vital Signs Last 24 Hrs  T(C): 37.6 (10 Nov 2024 20:19), Max: 37.6 (10 Nov 2024 20:19)  T(F): 99.7 (10 Nov 2024 20:19), Max: 99.7 (10 Nov 2024 20:19)  HR: 95 (10 Nov 2024 20:19) (90 - 95)  BP: 139/77 (10 Nov 2024 20:19) (130/68 - 139/77)  BP(mean): --  RR: 18 (10 Nov 2024 20:19) (18 - 18)  SpO2: 94% (10 Nov 2024 20:19) (94% - 97%)    Parameters below as of 10 Nov 2024 20:19  Patient On (Oxygen Delivery Method): room air        I&O's Summary          LABS:                        14.3   18.39 )-----------( 286      ( 10 Nov 2024 09:17 )             42.8     11-10    137  |  103  |  19  ----------------------------<  112[H]  3.3[L]   |  28  |  1.19    Ca    9.6      10 Nov 2024 09:17  Phos  2.9     11-10  Mg     2.1     11-10    TPro  7.9  /  Alb  3.9  /  TBili  0.8  /  DBili  x   /  AST  13[L]  /  ALT  38  /  AlkPhos  71  11-09      Urinalysis Basic - ( 10 Nov 2024 09:17 )    Color: x / Appearance: x / SG: x / pH: x  Gluc: 112 mg/dL / Ketone: x  / Bili: x / Urobili: x   Blood: x / Protein: x / Nitrite: x   Leuk Esterase: x / RBC: x / WBC x   Sq Epi: x / Non Sq Epi: x / Bacteria: x      CAPILLARY BLOOD GLUCOSE        LIVER FUNCTIONS - ( 09 Nov 2024 05:32 )  Alb: 3.9 g/dL / Pro: 7.9 gm/dL / ALK PHOS: 71 U/L / ALT: 38 U/L / AST: 13 U/L / GGT: x             Cultures:  Culture Results:   No growth at 24 hours (11-09 @ 06:06)  Culture Results:   No growth at 24 hours (11-09 @ 05:32)      RADIOLOGY & ADDITIONAL STUDIES:        
Pt seen at bedside, still report significant lower abdomen pain, fever overnight, no N/V    Vitals:  T(C): 37.7 (11-09 @ 16:06), Max: 38.1 (11-09 @ 03:20)  HR: 93 (11-09 @ 16:06) (90 - 106)  BP: 125/72 (11-09 @ 16:06) (108/58 - 136/84)  RR: 18 (11-09 @ 16:06) (18 - 18)  SpO2: 96% (11-09 @ 16:06) (96% - 98%)      Physical Exam:  General:  Aox3. NAD  Chest: Normal respiratory effort  Heart: RRR  Abdomen: Soft, ND, TTP on lower abdomen, no R/G   Neuro/Psych: No localized deficits. Normal spech, normal tone  Skin: Normal, no rashes, no lesions noted.   Extremities: Warm, well perfused, no edema, Pulses intact      11-09 @ 05:32                    14.8  CBC: 17.52>)-------(<305                     43.2                 136 | 105 | 21    CMP:  ----------------------< 123               3.8 | 26 | 1.26                      Ca:9.7  Phos:-  Mg:-               0.8|      |13        LFTs:  ------|71|-----             -|      |-      Urinalysis with Rflx Culture (collected 11-09-24 @ 06:06)      Current Inpatient Medications:  acetaminophen   IVPB .. 1000 milliGRAM(s) IV Intermittent once  acetaminophen   IVPB .. 1000 milliGRAM(s) IV Intermittent every 6 hours PRN  acetaminophen   IVPB .. 1000 milliGRAM(s) IV Intermittent every 6 hours PRN  enoxaparin Injectable 40 milliGRAM(s) SubCutaneous every 12 hours  HYDROmorphone  Injectable 0.5 milliGRAM(s) IV Push every 4 hours PRN  losartan 100 milliGRAM(s) Oral daily  ondansetron Injectable 4 milliGRAM(s) IV Push every 6 hours PRN  piperacillin/tazobactam IVPB.. 3.375 Gram(s) IV Intermittent every 8 hours  sodium chloride 0.9%. 1000 milliLiter(s) (100 mL/Hr) IV Continuous <Continuous>        
SURGERY DAILY PROGRESS NOTE:     Subjective:  Patient seen and examined this AM at bedside. No acute events overnight and patient resting comfortably.  Pain controlled. Ambulating. Denies fever/chills, shortness of breath, chest pain. VS reviewed  Ng still bilious, ostomy is functioning   ambulating voiding freely         Objective:        PHYSICAL EXAM   GENERAL: NAD, well developed, obese  HEAD: Atraumatic, normocephalic  EYES: EOMI, PERRLA, conjunctiva and sclera clear  ENT: moist mucous membrane, NGT with bilious output in canister & tubing  NECK: supple, No JVD, midline trachea  CHEST/LUNG: No increased WOB, symmetric excursions  Heart: RRR ppp, no peripheral edema  ABDOMEN: Round, distended, tympanic, soft, nontender, colostomy pink with mild gas and bilious output in bag  EXTREMITIES: Brisk cap refill. no clubbing or cyanosis  NERVOUS SYSTEM: AOx4, speech clear, no neuro-deficits  MSK: full ROM, no deformities  SKIN: warm to touch, no rash or lesions      Vitals:  T(C): 36.4 (11-15 @ 16:00), Max: 37.1 (11-15 @ 04:00)  HR: 72 (11-15 @ 16:00) (58 - 72)  BP: 129/89 (11-15 @ 16:00) (129/89 - 133/88)  RR: 18 (11-15 @ 16:00) (18 - 18)  SpO2: 96% (11-15 @ 16:00) (96% - 99%)    14 @ 07:01  -  11-15 @ 07:00  --------------------------------------------------------  IN:  Total IN: 0 mL    OUT:    Bulb (mL): 300 mL    Colostomy (mL): 65 mL    Nasogastric/Oral tube (mL): 1770 mL    Voided (mL): 470 mL  Total OUT: 2605 mL    Total NET: -2605 mL      11-15 @ 07:01  -   @ 02:44  --------------------------------------------------------  IN:    PPN (Peripheral Parenteral Nutrition): 1104 mL  Total IN: 1104 mL    OUT:    Bulb (mL): 40 mL    Colostomy (mL): 310 mL    Nasogastric/Oral tube (mL): 1250 mL    Voided (mL): 950 mL  Total OUT: 2550 mL    Total NET: -1446 mL          11-15 @ 05:05                    12.6  CBC: 9.77>)-------(<426                     37.7                 143 | 116 | 20    CMP:  ----------------------< 108               4.5 | 25 | 0.90                      Ca:8.4  Phos:4.5  M.5               0.6|      |79        LFTs:  ------|96|-----             -|      |-      Culture - Fungal, Body Fluid (collected 24 @ 14:22)  Source: Body Fluid  Preliminary Report (24 @ 10:59):    No growth    Culture - Acid Fast - Body Fluid w/Smear (collected 24 @ 14:22)  Source: Body Fluid  Preliminary Report (24 @ 23:07):    Culture is being performed.    Culture - Body Fluid with Gram Stain (collected 24 @ 14:22)  Source: Body Fluid  Gram Stain (24 @ 02:03):    polymorphonuclear leukocytes seen    No organisms seen    by cytocentrifuge  Preliminary Report (24 @ 19:58):    No growth      Current Inpatient Medications:  acetaminophen   IVPB .. 1000 milliGRAM(s) IV Intermittent once  enalaprilat Injectable 1.25 milliGRAM(s) IV Push every 6 hours PRN  heparin   Injectable 5000 Unit(s) SubCutaneous every 8 hours  HYDROmorphone  Injectable 0.5 milliGRAM(s) IV Push every 4 hours PRN  ondansetron Injectable 4 milliGRAM(s) IV Push every 6 hours PRN  Parenteral Nutrition - Adult 1 Each (92 mL/Hr) TPN Continuous <Continuous>  Parenteral Nutrition - Adult 1 Each (92 mL/Hr) TPN Continuous <Continuous>  piperacillin/tazobactam IVPB.. 3.375 Gram(s) IV Intermittent every 8 hours  potassium chloride  10 mEq/100 mL IVPB 10 milliEquivalent(s) IV Intermittent every 1 hour          
SURGERY DAILY PROGRESS NOTE:     Subjective:  Patient seen and examined this AM at bedside. No acute events overnight and patient resting comfortably. Started chewing gum yesterday. Pain controlled. Ambulating. Denies fever/chills, shortness of breath, chest pain. VS reviewed    Objective:    MEDICATIONS  (STANDING):  acetaminophen   IVPB .. 1000 milliGRAM(s) IV Intermittent once  heparin   Injectable 5000 Unit(s) SubCutaneous every 8 hours  Parenteral Nutrition - Adult 1 Each (92 mL/Hr) TPN Continuous <Continuous>  piperacillin/tazobactam IVPB.. 3.375 Gram(s) IV Intermittent every 8 hours  potassium chloride  10 mEq/100 mL IVPB 10 milliEquivalent(s) IV Intermittent every 1 hour    MEDICATIONS  (PRN):  enalaprilat Injectable 1.25 milliGRAM(s) IV Push every 6 hours PRN hypertension  HYDROmorphone  Injectable 0.5 milliGRAM(s) IV Push every 4 hours PRN Severe Pain (7 - 10)  ondansetron Injectable 4 milliGRAM(s) IV Push every 6 hours PRN Nausea      Vital Signs Last 24 Hrs  T(C): 37.1 (15 Nov 2024 08:29), Max: 37.4 (14 Nov 2024 16:00)  T(F): 98.8 (15 Nov 2024 08:29), Max: 99.3 (14 Nov 2024 16:00)  HR: 58 (15 Nov 2024 08:29) (58 - 72)  BP: 133/88 (15 Nov 2024 08:29) (132/82 - 140/80)  BP(mean): 100 (15 Nov 2024 08:29) (100 - 100)  RR: 18 (15 Nov 2024 08:29) (18 - 18)  SpO2: 99% (15 Nov 2024 08:29) (96% - 99%)    Parameters below as of 15 Nov 2024 08:29  Patient On (Oxygen Delivery Method): room air          PHYSICAL EXAM   GENERAL: NAD, well developed, obese  HEAD: Atraumatic, normocephalic  EYES: EOMI, PERRLA, conjunctiva and sclera clear  ENT: moist mucous membrane, NGT with bilious output in canister & tubing  NECK: supple, No JVD, midline trachea  CHEST/LUNG: No increased WOB, symmetric excursions  Heart: RRR ppp, no peripheral edema  ABDOMEN: Round, distended, tympanic, soft, nontender, colostomy pink with mild gas and bilious output in bag  EXTREMITIES: Brisk cap refill. no clubbing or cyanosis  NERVOUS SYSTEM: AOx4, speech clear, no neuro-deficits  MSK: full ROM, no deformities  SKIN: warm to touch, no rash or lesions      I&O's Detail    14 Nov 2024 07:01  -  15 Nov 2024 07:00  --------------------------------------------------------  IN:  Total IN: 0 mL    OUT:    Bulb (mL): 300 mL    Colostomy (mL): 65 mL    Nasogastric/Oral tube (mL): 1770 mL    Voided (mL): 470 mL  Total OUT: 2605 mL    Total NET: -2605 mL      15 Nov 2024 07:01  -  15 Nov 2024 09:21  --------------------------------------------------------  IN:    PPN (Peripheral Parenteral Nutrition): 184 mL  Total IN: 184 mL    OUT:    Colostomy (mL): 110 mL    Voided (mL): 300 mL  Total OUT: 410 mL    Total NET: -226 mL          Daily     Daily     LABS:                        12.6   9.77  )-----------( 426      ( 15 Nov 2024 05:05 )             37.7     11-15    143  |  116[H]  |  20  ----------------------------<  108[H]  4.5   |  25  |  0.90    Ca    8.4[L]      15 Nov 2024 05:05  Phos  4.5     11-15  Mg     2.5     11-15    TPro  6.4  /  Alb  2.5[L]  /  TBili  0.6  /  DBili  x   /  AST  79[H]  /  ALT  149[H]  /  AlkPhos  96  11-15      Urinalysis Basic - ( 15 Nov 2024 05:05 )    Color: x / Appearance: x / SG: x / pH: x  Gluc: 108 mg/dL / Ketone: x  / Bili: x / Urobili: x   Blood: x / Protein: x / Nitrite: x   Leuk Esterase: x / RBC: x / WBC x   Sq Epi: x / Non Sq Epi: x / Bacteria: x        RADIOLOGY & ADDITIONAL STUDIES:    11/14 AXR  IMPRESSION:  Similar small bowel dilatation.

## 2024-11-20 NOTE — PROGRESS NOTE ADULT - ASSESSMENT
38 yo M now POD8 s/p Catalina for sigmoid diverticulitis  Ostomy +Gas/BM  Ng removed and tolerating LRD  ANTONELLA drain removed 11/19      Plan   - LRD  - F/u colostomy output  - OOB/ambulate/pulmonary toilet  - VTE ppx  - Ostomy teaching   - pain control   - Possible DC today      Will d/w CRS team & update accordingly

## 2024-11-20 NOTE — DISCHARGE NOTE PROVIDER - NSDCMRMEDTOKEN_GEN_ALL_CORE_FT
losartan-hydroCHLOROthiazide 100 mg-25 mg oral tablet: 1 tab(s) orally once a day  oxycodone-acetaminophen 5 mg-325 mg oral tablet: 1 tab(s) orally every 6 hours as needed for  moderate pain MDD: 004   amoxicillin-clavulanate 875 mg-125 mg oral tablet: 875 milligram(s) orally 2 times a day  losartan-hydroCHLOROthiazide 100 mg-25 mg oral tablet: 1 tab(s) orally once a day  oxycodone-acetaminophen 5 mg-325 mg oral tablet: 1 tab(s) orally every 6 hours as needed for  moderate pain MDD: 004

## 2024-11-20 NOTE — PROGRESS NOTE ADULT - PROVIDER SPECIALTY LIST ADULT
Colorectal Surgery

## 2024-11-20 NOTE — CHART NOTE - NSCHARTNOTESELECT_GEN_ALL_CORE
Dietitian Brief Note
Dietitian F/U w/ PPN

## 2024-11-20 NOTE — DISCHARGE NOTE PROVIDER - NSTOBACCOUSAGEY/N_GEN_A_CS
Transfusion Medicine Progress Note    Subjective: Patient seen and examined. Denies any bleeding symptoms.     HPI:  31y F PMH progressive anti-PATI encephalitis (2020), SLE,  Cardiomyopathy, autonomic dysfunction, Crohns disease, raynaud, RA and fibromyalgia presents for flair of autoimmune encephalitis. Her symptoms are typically psychiatric in nature but were well-controlled until 1 month ago when she had new onset suicidal ideation with plan (to take excess meds) and worsening of cognition and memory and sleep. Her outpatient labs in preparation for routine repeat LP revealed PATI 547. She follows closely with psychiatry with plan to increase lamotrigine 12.5mg to 25mg. She endorsed once weekly migraines 3/10 left sided pressure +photophobia +phonophobia +osmophobia with visual floaters and nausea.  Since May 2022 she has been on azathioprine with the most recent regiment 50mg AM 75mg PM  For AE flare In the past she has responded to steroid and PLEX. Did not tolerate Rituxan. Denied recent fever, chills, cough, URI, GI upset, rash.       Vital Signs Last 24 Hrs  T(C): 36.9 (04-02-23 @ 13:09), Max: 36.9 (04-02-23 @ 13:09)  T(F): 98.5 (04-02-23 @ 13:09), Max: 98.5 (04-02-23 @ 13:09)  HR: 81 (04-02-23 @ 13:09) (80 - 93)  BP: 105/62 (04-02-23 @ 13:09) (100/60 - 113/70)  RR: 16 (04-02-23 @ 13:09) (16 - 18)  SpO2: 95% (04-02-23 @ 13:09) (95% - 97%)      GENERAL: NAD, well-developed  HEAD:  Atraumatic, Normocephalic  EYES: EOMI, PERRLA, conjunctiva and sclera clear  NECK: Supple, No JVD  CHEST/LUNG: Clear to auscultation bilaterally; No wheeze  HEART: Regular rate and rhythm; No murmurs, rubs, or gallops  ABDOMEN: Soft, Nontender, Nondistended; Bowel sounds present  EXTREMITIES:  2+ Peripheral Pulses, No clubbing, cyanosis, or edema  NEUROLOGY: left sided weakness compared to right side, AAOx3  SKIN: No rashes or lesions                                     11.1   11.97 )-----------( 196      ( 02 Apr 2023 07:52 )             33.2       04-02    143  |  107  |  18  ----------------------------<  144<H>  4.1   |  22  |  0.70    Ca    8.8      02 Apr 2023 07:52  Phos  4.5     04-02  Mg     2.2     04-02    PT/INR - ( 02 Apr 2023 11:48 )   PT: 16.0 sec;   INR: 1.34       PTT - ( 02 Apr 2023 11:48 )  PTT:32.9 sec       No

## 2024-11-20 NOTE — DISCHARGE NOTE PROVIDER - HOSPITAL COURSE
Gait Disorder Pt underwent a lombardo procedure for sigmoid diverticulitis complicated with intra-abdominal abscess, postop with ileus, requiring NGT, TPN, IVF. He slowly improved with stoma function, received IV antibiotics, voiding, and once ngt removed along with ANTONELLA drain he tolerated diet. Pt underwent a lombardo procedure for sigmoid diverticulitis complicated with intra-abdominal abscess, postop with ileus, requiring NGT, TPN, IVF. He slowly improved with stoma function, received IV antibiotics, voiding, and once ngt removed along with ANTONELLA drain, he tolerated diet.

## 2024-11-20 NOTE — CHART NOTE - NSCHARTNOTEFT_GEN_A_CORE
RD provided extensive written and verbal education on ileostomy MNT. Spouse was present at bed side and engaging in education session. Both pt and spouse w/o complaints. Recalled diet education, assessed knowledge. Addressed all questions/concerns at this time. Compliance to diet expected. Left [facility] ileostomy MNT handout and RD contact information at bed side.     RD to remain available for any further questions.   Lisa Kohler RDN, CDN [phone]

## 2024-11-20 NOTE — DISCHARGE NOTE PROVIDER - NSDCFUADDINST_GEN_ALL_CORE_FT
Please read the instructions outlined below and refer to them for the next few weeks. These discharge instructions provide you with general information on caring for yourself after surgery. While your treatment has been planned according to the most current medical practices available, unavoidable complications occasionally occur. If you have any problems or questions after discharge, please call your surgeon.    DIET: Soft foods. No salads or raw/steamed fruit/vegetables for the first two weeks.  Eat six smaller  meals, rather than three main meals for the first week or two. It commonly takes 2-3 weeks for  the bowel pattern to normalize. During that time, take nothing stronger than prune juice (4-6 oz.  warmed) to encourage a bowel movement.    ACTIVITY: It is also normal to feel tired and take naps in the afternoon. Avoid lifting over 10 pounds. You may shower, but no tub bathing. Stairs are okay. You may ride in a car. Walking is encouraged. You may drive if not on oral narcotic  pain meds. Be advised narcotics such as Percocet can cauze drowsiness.     MEDICATIONS: (Please refer to the hospital discharge medication form)    DRESSING: May remove abdominal dressing if present 1 week from date of surgery, and leave open to the air.     Resume all your usual pre-surgery medicines.  It is normal to be sore for 2-4 weeks following surgery. Call your surgeon if this seems to be getting worse rather than better. Only take over-the-counter or prescription medicines for pain, discomfort, or fever as directed by your surgeon.    CALL YOUR SURGEONS OFFICE IF YOU DEVELOP:    An wound which becomes red, swollen, increasingly painful or begins to bleed/drain.  An unexplained temperature over 101° F (38.3° C).  Experience worsening abdominal pain, nausea, or vomiting.  Bleeding with bowel movements    FOLLOW UP:  Notes the date for your after surgery appointment. Your postoperative course and pathology report will be reviewed. All questions will be answered, and continued care instructions given.   Please read the instructions outlined below and refer to them for the next few weeks. These discharge instructions provide you with general information on caring for yourself after surgery. While your treatment has been planned according to the most current medical practices available, unavoidable complications occasionally occur. If you have any problems or questions after discharge, please call your surgeon.    Please take Augmentin as prescribed and called into the [facility] pharmacy in this electronic system. It is a five day course.    DIET: Soft foods. No salads or raw/steamed fruit/vegetables for the first two weeks.  Eat six smaller  meals, rather than three main meals for the first week or two. It commonly takes 2-3 weeks for  the bowel pattern to normalize. During that time, take nothing stronger than prune juice (4-6 oz.  warmed) to encourage a bowel movement.    ACTIVITY: It is also normal to feel tired and take naps in the afternoon. Avoid lifting over 10 pounds. You may shower, but no tub bathing. Stairs are okay. You may ride in a car. Walking is encouraged. You may drive if not on oral narcotic  pain meds. Be advised narcotics such as Percocet can cauze drowsiness.     MEDICATIONS: (Please refer to the hospital discharge medication form)    DRESSING: May remove abdominal dressing if present 1 week from date of surgery, and leave open to the air.     Resume all your usual pre-surgery medicines.  It is normal to be sore for 2-4 weeks following surgery. Call your surgeon if this seems to be getting worse rather than better. Only take over-the-counter or prescription medicines for pain, discomfort, or fever as directed by your surgeon.    CALL YOUR SURGEONS OFFICE IF YOU DEVELOP:    An wound which becomes red, swollen, increasingly painful or begins to bleed/drain.  An unexplained temperature over 101° F (38.3° C).  Experience worsening abdominal pain, nausea, or vomiting.  Bleeding with bowel movements    FOLLOW UP:  Notes the date for your after surgery appointment. Your postoperative course and pathology report will be reviewed. All questions will be answered, and continued care instructions given.

## 2024-11-20 NOTE — ED ADULT TRIAGE NOTE - DIRECT TO ROOM CARE INITIATED:
FYI:  pt states saw cardiologist Dr. Meek on 11/19/2024. Dr. Meek will not do catheterization until after pt has consulted with Dr. Salinas on 12/13/2024   31-Aug-2020 00:49

## 2024-11-20 NOTE — PROGRESS NOTE ADULT - ATTENDING COMMENTS
Patient feels well, no N/V, tolerating diet, ostomy functional.  Abdomen soft, nondistended, appropriately tender.  Mild resolving erythema around incision.  Colostomy pink, perfused, productive of soft brown stool.  Staples in place.  Labs - leukocytosis resolving, electrolytes appropriate.    -- Low fiber diet  -- Ostomy care and education  -- Antibiotics - current plan for no abx at home  -- DVT prophylaxis  -- IS, ambulation  -- Reglan - discontinue upon discharge  -- Home care setup as per case management  -- Plan for home once resources set up Patient feels well, no N/V, tolerating diet, ostomy functional.  Abdomen soft, nondistended, appropriately tender.  Mild resolving erythema around incision.  Colostomy pink, perfused, productive of soft brown stool.  Staples in place.  Labs - leukocytosis resolving, electrolytes appropriate.    -- Low fiber diet  -- Ostomy care and education  -- Antibiotics - discharge on 5 more days Augmentin  -- DVT prophylaxis  -- IS, ambulation  -- Reglan - discontinue upon discharge  -- Home care setup as per case management  -- Plan for home once resources set up

## 2024-11-20 NOTE — DISCHARGE NOTE PROVIDER - CARE PROVIDER_API CALL
Manish Marcial.  Colon/Rectal Surgery  [address] [place] [address]  Phone: [phone]  Fax: [phone]  Follow Up Time: 2 weeks

## 2024-11-25 DIAGNOSIS — Z53.31 LAPAROSCOPIC SURGICAL PROCEDURE CONVERTED TO OPEN PROCEDURE: ICD-10-CM

## 2024-11-25 DIAGNOSIS — E66.01 MORBID (SEVERE) OBESITY DUE TO EXCESS CALORIES: ICD-10-CM

## 2024-11-25 DIAGNOSIS — K65.9 PERITONITIS, UNSPECIFIED: ICD-10-CM

## 2024-11-25 DIAGNOSIS — Z79.899 OTHER LONG TERM (CURRENT) DRUG THERAPY: ICD-10-CM

## 2024-11-25 DIAGNOSIS — I10 ESSENTIAL (PRIMARY) HYPERTENSION: ICD-10-CM

## 2024-11-25 DIAGNOSIS — K57.20 DIVERTICULITIS OF LARGE INTESTINE WITH PERFORATION AND ABSCESS WITHOUT BLEEDING: ICD-10-CM

## 2024-11-25 DIAGNOSIS — K91.89 OTHER POSTPROCEDURAL COMPLICATIONS AND DISORDERS OF DIGESTIVE SYSTEM: ICD-10-CM

## 2024-11-25 DIAGNOSIS — K56.7 ILEUS, UNSPECIFIED: ICD-10-CM

## 2024-11-25 DIAGNOSIS — K65.1 PERITONEAL ABSCESS: ICD-10-CM

## 2024-12-09 ENCOUNTER — NON-APPOINTMENT (OUTPATIENT)
Age: 39
End: 2024-12-09

## 2024-12-09 ENCOUNTER — APPOINTMENT (OUTPATIENT)
Dept: COLORECTAL SURGERY | Facility: CLINIC | Age: 39
End: 2024-12-09
Payer: COMMERCIAL

## 2024-12-09 VITALS
TEMPERATURE: 98.4 F | BODY MASS INDEX: 34.5 KG/M2 | HEART RATE: 83 BPM | DIASTOLIC BLOOD PRESSURE: 86 MMHG | RESPIRATION RATE: 14 BRPM | SYSTOLIC BLOOD PRESSURE: 121 MMHG | HEIGHT: 70 IN | WEIGHT: 241 LBS | OXYGEN SATURATION: 97 %

## 2024-12-09 DIAGNOSIS — Z93.3 COLOSTOMY STATUS: ICD-10-CM

## 2024-12-09 DIAGNOSIS — Z09 ENCOUNTER FOR FOLLOW-UP EXAMINATION AFTER COMPLETED TREATMENT FOR CONDITIONS OTHER THAN MALIGNANT NEOPLASM: ICD-10-CM

## 2024-12-09 PROCEDURE — 99024 POSTOP FOLLOW-UP VISIT: CPT

## 2024-12-11 LAB
CULTURE RESULTS: SIGNIFICANT CHANGE UP
SPECIMEN SOURCE: SIGNIFICANT CHANGE UP

## 2025-01-27 ENCOUNTER — APPOINTMENT (OUTPATIENT)
Dept: COLORECTAL SURGERY | Facility: CLINIC | Age: 40
End: 2025-01-27
Payer: COMMERCIAL

## 2025-01-27 VITALS
HEART RATE: 100 BPM | BODY MASS INDEX: 34.5 KG/M2 | TEMPERATURE: 97.6 F | WEIGHT: 241 LBS | DIASTOLIC BLOOD PRESSURE: 90 MMHG | SYSTOLIC BLOOD PRESSURE: 139 MMHG | HEIGHT: 70 IN | RESPIRATION RATE: 15 BRPM

## 2025-01-27 DIAGNOSIS — Z12.11 ENCOUNTER FOR SCREENING FOR MALIGNANT NEOPLASM OF COLON: ICD-10-CM

## 2025-01-27 DIAGNOSIS — Z93.3 COLOSTOMY STATUS: ICD-10-CM

## 2025-01-27 PROCEDURE — 99024 POSTOP FOLLOW-UP VISIT: CPT

## 2025-02-27 ENCOUNTER — APPOINTMENT (OUTPATIENT)
Dept: COLORECTAL SURGERY | Facility: CLINIC | Age: 40
End: 2025-02-27

## 2025-03-12 ENCOUNTER — OUTPATIENT (OUTPATIENT)
Dept: OUTPATIENT SERVICES | Facility: HOSPITAL | Age: 40
LOS: 1 days | Discharge: HOME | End: 2025-03-12
Payer: COMMERCIAL

## 2025-03-12 ENCOUNTER — APPOINTMENT (OUTPATIENT)
Dept: COLORECTAL SURGERY | Facility: HOSPITAL | Age: 40
End: 2025-03-12

## 2025-03-12 ENCOUNTER — RESULT REVIEW (OUTPATIENT)
Age: 40
End: 2025-03-12

## 2025-03-12 VITALS
HEIGHT: 70 IN | TEMPERATURE: 98 F | RESPIRATION RATE: 14 BRPM | OXYGEN SATURATION: 98 % | DIASTOLIC BLOOD PRESSURE: 79 MMHG | HEART RATE: 77 BPM | SYSTOLIC BLOOD PRESSURE: 133 MMHG | WEIGHT: 250 LBS

## 2025-03-12 DIAGNOSIS — Z87.19 PERSONAL HISTORY OF OTHER DISEASES OF THE DIGESTIVE SYSTEM: Chronic | ICD-10-CM

## 2025-03-12 DIAGNOSIS — K63.89 OTHER SPECIFIED DISEASES OF INTESTINE: ICD-10-CM

## 2025-03-12 DIAGNOSIS — Z93.3 COLOSTOMY STATUS: ICD-10-CM

## 2025-03-12 DIAGNOSIS — Z93.3 COLOSTOMY STATUS: Chronic | ICD-10-CM

## 2025-03-12 DIAGNOSIS — G47.33 OBSTRUCTIVE SLEEP APNEA (ADULT) (PEDIATRIC): ICD-10-CM

## 2025-03-12 DIAGNOSIS — K57.30 DIVERTICULOSIS OF LARGE INTESTINE WITHOUT PERFORATION OR ABSCESS WITHOUT BLEEDING: ICD-10-CM

## 2025-03-12 PROCEDURE — 88341 IMHCHEM/IMCYTCHM EA ADD ANTB: CPT

## 2025-03-12 PROCEDURE — 88305 TISSUE EXAM BY PATHOLOGIST: CPT | Mod: 26

## 2025-03-12 PROCEDURE — 88341 IMHCHEM/IMCYTCHM EA ADD ANTB: CPT | Mod: 26

## 2025-03-12 PROCEDURE — 88342 IMHCHEM/IMCYTCHM 1ST ANTB: CPT | Mod: 26

## 2025-03-12 PROCEDURE — 88305 TISSUE EXAM BY PATHOLOGIST: CPT

## 2025-03-12 PROCEDURE — 45331 SIGMOIDOSCOPY AND BIOPSY: CPT | Mod: 59

## 2025-03-12 PROCEDURE — 44388 COLONOSCOPY THRU STOMA SPX: CPT

## 2025-03-12 PROCEDURE — 88342 IMHCHEM/IMCYTCHM 1ST ANTB: CPT

## 2025-03-12 RX ORDER — LOSARTAN POTASSIUM 100 MG/1
0 TABLET, FILM COATED ORAL
Refills: 0 | DISCHARGE

## 2025-03-12 RX ORDER — FENOFIBRATE 160 MG/1
0 TABLET ORAL
Refills: 0 | DISCHARGE

## 2025-03-12 RX ORDER — ICOSAPENT ETHYL 500 MG/1
2 CAPSULE ORAL
Refills: 0 | DISCHARGE

## 2025-03-12 NOTE — ASU PATIENT PROFILE, ADULT - FALL HARM RISK - UNIVERSAL INTERVENTIONS
Bed in lowest position, wheels locked, appropriate side rails in place/Call bell, personal items and telephone in reach/Instruct patient to call for assistance before getting out of bed or chair/Non-slip footwear when patient is out of bed/[place] to call system/Physically safe environment - no spills, clutter or unnecessary equipment/Purposeful Proactive Rounding/Room/bathroom lighting operational, light cord in reach

## 2025-03-12 NOTE — ASU PREOP CHECKLIST - NS PREOP CHK CHLOROHEX WASH
Problem: Risk of Harm:  Goal: Ability to remain free from injury will improve  Ability to remain free from injury will improve   Outcome: Ongoing  Pt did not participate in community meeting/ goals group at 0900 despite staff encouragement to attend. N/A

## 2025-03-12 NOTE — ASU PATIENT PROFILE, ADULT - BLOOD TRANSFUSION, PREVIOUS, PROFILE
Render Note In Bullet Format When Appropriate: No Show Applicator Variable?: Yes Post-Care Instructions: I reviewed with the patient in detail post-care instructions. Patient is to wear sunprotection, and avoid picking at any of the treated lesions. Pt may apply Vaseline to crusted or scabbing areas. Duration Of Freeze Thaw-Cycle (Seconds): 3 Number Of Freeze-Thaw Cycles: 3 freeze-thaw cycles Detail Level: Simple Consent: The patient's consent was obtained including but not limited to risks of crusting, scabbing, blistering, scarring, darker or lighter pigmentary change, recurrence, incomplete removal and infection. no

## 2025-03-13 PROBLEM — I10 ESSENTIAL (PRIMARY) HYPERTENSION: Chronic | Status: ACTIVE | Noted: 2025-03-12

## 2025-03-13 PROBLEM — E78.00 PURE HYPERCHOLESTEROLEMIA, UNSPECIFIED: Chronic | Status: ACTIVE | Noted: 2025-03-12

## 2025-03-19 LAB — SURGICAL PATHOLOGY STUDY: SIGNIFICANT CHANGE UP

## 2025-04-14 ENCOUNTER — APPOINTMENT (OUTPATIENT)
Dept: COLORECTAL SURGERY | Facility: CLINIC | Age: 40
End: 2025-04-14
Payer: COMMERCIAL

## 2025-04-14 VITALS
SYSTOLIC BLOOD PRESSURE: 149 MMHG | HEIGHT: 70 IN | HEART RATE: 77 BPM | BODY MASS INDEX: 35.79 KG/M2 | WEIGHT: 250 LBS | OXYGEN SATURATION: 97 % | TEMPERATURE: 98.5 F | DIASTOLIC BLOOD PRESSURE: 90 MMHG

## 2025-04-14 DIAGNOSIS — Z93.3 COLOSTOMY STATUS: ICD-10-CM

## 2025-04-14 PROCEDURE — 99214 OFFICE O/P EST MOD 30 MIN: CPT

## 2025-05-22 ENCOUNTER — OUTPATIENT (OUTPATIENT)
Dept: OUTPATIENT SERVICES | Facility: HOSPITAL | Age: 40
LOS: 1 days | End: 2025-05-22
Payer: COMMERCIAL

## 2025-05-22 VITALS
DIASTOLIC BLOOD PRESSURE: 74 MMHG | SYSTOLIC BLOOD PRESSURE: 118 MMHG | HEIGHT: 70 IN | HEART RATE: 80 BPM | OXYGEN SATURATION: 100 % | WEIGHT: 268.52 LBS | RESPIRATION RATE: 16 BRPM | TEMPERATURE: 99 F

## 2025-05-22 DIAGNOSIS — Z93.3 COLOSTOMY STATUS: Chronic | ICD-10-CM

## 2025-05-22 DIAGNOSIS — Z90.49 ACQUIRED ABSENCE OF OTHER SPECIFIED PARTS OF DIGESTIVE TRACT: Chronic | ICD-10-CM

## 2025-05-22 DIAGNOSIS — Z29.9 ENCOUNTER FOR PROPHYLACTIC MEASURES, UNSPECIFIED: ICD-10-CM

## 2025-05-22 DIAGNOSIS — Z01.818 ENCOUNTER FOR OTHER PREPROCEDURAL EXAMINATION: ICD-10-CM

## 2025-05-22 DIAGNOSIS — Z90.89 ACQUIRED ABSENCE OF OTHER ORGANS: Chronic | ICD-10-CM

## 2025-05-22 DIAGNOSIS — Z87.19 PERSONAL HISTORY OF OTHER DISEASES OF THE DIGESTIVE SYSTEM: Chronic | ICD-10-CM

## 2025-05-22 DIAGNOSIS — Z98.890 OTHER SPECIFIED POSTPROCEDURAL STATES: Chronic | ICD-10-CM

## 2025-05-22 LAB
ALBUMIN SERPL ELPH-MCNC: 4.2 G/DL — SIGNIFICANT CHANGE UP (ref 3.3–5)
ALP SERPL-CCNC: 75 U/L — SIGNIFICANT CHANGE UP (ref 40–120)
ALT FLD-CCNC: 53 U/L — SIGNIFICANT CHANGE UP (ref 12–78)
ANION GAP SERPL CALC-SCNC: 1 MMOL/L — LOW (ref 5–17)
APTT BLD: 32.3 SEC — SIGNIFICANT CHANGE UP (ref 26.1–36.8)
AST SERPL-CCNC: 22 U/L — SIGNIFICANT CHANGE UP (ref 15–37)
BASOPHILS # BLD AUTO: 0.02 K/UL — SIGNIFICANT CHANGE UP (ref 0–0.2)
BASOPHILS NFR BLD AUTO: 0.3 % — SIGNIFICANT CHANGE UP (ref 0–2)
BILIRUB SERPL-MCNC: 0.4 MG/DL — SIGNIFICANT CHANGE UP (ref 0.2–1.2)
BLD GP AB SCN SERPL QL: SIGNIFICANT CHANGE UP
BUN SERPL-MCNC: 17 MG/DL — SIGNIFICANT CHANGE UP (ref 7–23)
CALCIUM SERPL-MCNC: 9.9 MG/DL — SIGNIFICANT CHANGE UP (ref 8.5–10.1)
CHLORIDE SERPL-SCNC: 105 MMOL/L — SIGNIFICANT CHANGE UP (ref 96–108)
CO2 SERPL-SCNC: 31 MMOL/L — SIGNIFICANT CHANGE UP (ref 22–31)
CREAT SERPL-MCNC: 1.17 MG/DL — SIGNIFICANT CHANGE UP (ref 0.5–1.3)
EGFR: 82 ML/MIN/1.73M2 — SIGNIFICANT CHANGE UP
EGFR: 82 ML/MIN/1.73M2 — SIGNIFICANT CHANGE UP
EOSINOPHIL # BLD AUTO: 0.19 K/UL — SIGNIFICANT CHANGE UP (ref 0–0.5)
EOSINOPHIL NFR BLD AUTO: 3.3 % — SIGNIFICANT CHANGE UP (ref 0–6)
GLUCOSE SERPL-MCNC: 106 MG/DL — HIGH (ref 70–99)
HCT VFR BLD CALC: 40.9 % — SIGNIFICANT CHANGE UP (ref 39–50)
HGB BLD-MCNC: 13.9 G/DL — SIGNIFICANT CHANGE UP (ref 13–17)
IMM GRANULOCYTES # BLD AUTO: 0.02 K/UL — SIGNIFICANT CHANGE UP (ref 0–0.07)
IMM GRANULOCYTES NFR BLD AUTO: 0.3 % — SIGNIFICANT CHANGE UP (ref 0–0.9)
INR BLD: 0.96 RATIO — SIGNIFICANT CHANGE UP (ref 0.85–1.16)
LYMPHOCYTES # BLD AUTO: 1.76 K/UL — SIGNIFICANT CHANGE UP (ref 1–3.3)
LYMPHOCYTES NFR BLD AUTO: 30.3 % — SIGNIFICANT CHANGE UP (ref 13–44)
MCHC RBC-ENTMCNC: 29.9 PG — SIGNIFICANT CHANGE UP (ref 27–34)
MCHC RBC-ENTMCNC: 34 G/DL — SIGNIFICANT CHANGE UP (ref 32–36)
MCV RBC AUTO: 88 FL — SIGNIFICANT CHANGE UP (ref 80–100)
MONOCYTES # BLD AUTO: 0.57 K/UL — SIGNIFICANT CHANGE UP (ref 0–0.9)
MONOCYTES NFR BLD AUTO: 9.8 % — SIGNIFICANT CHANGE UP (ref 2–14)
NEUTROPHILS # BLD AUTO: 3.25 K/UL — SIGNIFICANT CHANGE UP (ref 1.8–7.4)
NEUTROPHILS NFR BLD AUTO: 56 % — SIGNIFICANT CHANGE UP (ref 43–77)
NRBC # BLD AUTO: 0 K/UL — SIGNIFICANT CHANGE UP (ref 0–0)
NRBC # FLD: 0 K/UL — SIGNIFICANT CHANGE UP (ref 0–0)
NRBC BLD AUTO-RTO: 0 /100 WBCS — SIGNIFICANT CHANGE UP (ref 0–0)
PLATELET # BLD AUTO: 329 K/UL — SIGNIFICANT CHANGE UP (ref 150–400)
PMV BLD: 10.3 FL — SIGNIFICANT CHANGE UP (ref 7–13)
POTASSIUM SERPL-MCNC: 3.7 MMOL/L — SIGNIFICANT CHANGE UP (ref 3.5–5.3)
POTASSIUM SERPL-SCNC: 3.7 MMOL/L — SIGNIFICANT CHANGE UP (ref 3.5–5.3)
PROT SERPL-MCNC: 8.3 GM/DL — SIGNIFICANT CHANGE UP (ref 6–8.3)
PROTHROM AB SERPL-ACNC: 11.3 SEC — SIGNIFICANT CHANGE UP (ref 9.9–13.4)
RBC # BLD: 4.65 M/UL — SIGNIFICANT CHANGE UP (ref 4.2–5.8)
RBC # FLD: 14.6 % — HIGH (ref 10.3–14.5)
SODIUM SERPL-SCNC: 137 MMOL/L — SIGNIFICANT CHANGE UP (ref 135–145)
WBC # BLD: 5.81 K/UL — SIGNIFICANT CHANGE UP (ref 3.8–10.5)
WBC # FLD AUTO: 5.81 K/UL — SIGNIFICANT CHANGE UP (ref 3.8–10.5)

## 2025-05-22 PROCEDURE — 93010 ELECTROCARDIOGRAM REPORT: CPT

## 2025-05-22 PROCEDURE — 80053 COMPREHEN METABOLIC PANEL: CPT

## 2025-05-22 PROCEDURE — 83036 HEMOGLOBIN GLYCOSYLATED A1C: CPT

## 2025-05-22 PROCEDURE — 86850 RBC ANTIBODY SCREEN: CPT

## 2025-05-22 PROCEDURE — 36415 COLL VENOUS BLD VENIPUNCTURE: CPT

## 2025-05-22 PROCEDURE — 85610 PROTHROMBIN TIME: CPT

## 2025-05-22 PROCEDURE — 85730 THROMBOPLASTIN TIME PARTIAL: CPT

## 2025-05-22 PROCEDURE — 85025 COMPLETE CBC W/AUTO DIFF WBC: CPT

## 2025-05-22 PROCEDURE — 86901 BLOOD TYPING SEROLOGIC RH(D): CPT

## 2025-05-22 PROCEDURE — 86900 BLOOD TYPING SEROLOGIC ABO: CPT

## 2025-05-22 PROCEDURE — 93005 ELECTROCARDIOGRAM TRACING: CPT

## 2025-05-22 PROCEDURE — 99214 OFFICE O/P EST MOD 30 MIN: CPT | Mod: 25

## 2025-05-22 NOTE — H&P PST ADULT - ATTENDING COMMENTS
Patient seen and evaluated in ASU this morning, Plan for Robot assisted colostomy reversal, possible open, possible ostomy.

## 2025-05-22 NOTE — H&P PST ADULT - NSICDXPASTMEDICALHX_GEN_ALL_CORE_FT
PAST MEDICAL HISTORY:  Diverticulitis     Fatty liver     Hearing loss     High cholesterol     HTN (hypertension)     Left rotator cuff tear     Prediabetes     Sleep apnea      PAST MEDICAL HISTORY:  Colostomy present     Diverticulitis     Fatty liver     Hearing loss     High cholesterol     HTN (hypertension)     Left rotator cuff tear     Prediabetes     Sleep apnea

## 2025-05-22 NOTE — H&P PST ADULT - NSICDXPASTSURGICALHX_GEN_ALL_CORE_FT
PAST SURGICAL HISTORY:  H/O colonoscopy     History of cholecystectomy     History of tonsillectomy     Status post Catalina's procedure

## 2025-05-22 NOTE — H&P PST ADULT - ASSESSMENT
39 y.o male scheduled for Robotic Assisted Colostomy Reversal , possible Open   Plan  1. Stop all NSAIDS, herbal supplements and vitamins for 7 days.  2. NPO at midnight.  3. Take the following medications --none-- with small sips of water on the morning of your procedure/surgery. Do not take Losartan/HCTZ night before surgery   4. Use EZ sponges as directed  5. Labs, EKG as per surgeon  6. PMD A. Patane  visit for optimization prior to surgery as per surgeon  7. Preprocedure education provided including Colon surgery patient instruction sheet    CAPRINI SCORE Version 2013    AGE RELATED RISK FACTORS                                                             [ ] Age 41-60 years             [1 point]  [ ] Age: 61-74 years            [2 points]                 [ ] Age 75 years or over     [3 points]             DISEASE RELATED RISK FACTORS                                                       [ ] Current swollen legs (pitting edema of any level)     [1 point]                     [ ] Varicose veins (visible, bulging vein, not spider veins or surgically removed veins)   [1 point]                                 [x ] BMI > 25 Kg/m2                       [1 point]  [ ] BMI > 40 kg/m2                       [1 point]                                 [ ] Serious infection (within past month, requiring hospitalization and IV antibiotics)    [1 point]                     [ ] Lung disease ( interstitial: COPD, emphysema, sarcoid, 9-11 illness, NOT asthma)       [1 point]                                                                          [ ] Acute myocardial infarction (within past month)      [1 point]                  [ ] Congestive heart failure (episode within past month or taking CHF meds)                   [1 point]         [ ] Inflammatory bowel disease (Crohns disease or ulcerative colitis, not irritable bowel syndrome)   [1 point]                  [ ] Central venous access, PICC line or Port (within past month)     [2 points]                                                             [ ] Stroke (within past month)     [5 points]    [ ] Previous or present malignancy (includes melanoma but not basal cell carcinoma, each incidence of cancer scores 2 points-not metastases)  [2 points]                                                                                                                                                         HEMATOLOGY RELATED FACTORS                                                         [ ] History of DVT or PE (including superficial venous thrombosis)    [3 points]                    [ ] Positive family history for DVT or PE (includes first-, second-, and third-degree relatives)   [3 points]   [ ] Personal or family history of genetic thrombophilia (family history counts only if it has not been confirmed that patient does not have this genetic marker)                       [ ] Prothrombin [id number] mutation                   [3 points]                           [ ] Factor V Leiden                                               [3 points]            [ ] Antithrombin III deficiency                           [3 points]         [ ] Protein C & S deficiency                                [3 points]              [ ] Dysfibrinogenemia                                         [3 points]  [ ] Personal history of acquired thrombophilia                       [ ] Lupus anticoagulant                                       [3 points]                                                                  [ ] Anticardiolipin antibodies                             [3 points]              [ ] Antiphospholipid antibodies                         [3 points]                                                         [ ] High homocysteine in the blood                  [3 points]                                                    [ ] Myeloproliferative disorders (including thrombocytosis)    [3 points]            [ ] HIV                                                                     [3 points]                                                    [ ] Heparin induced thrombocytopenia            [3 points]                                        MOBILITY RELATED FACTORS  [ ] Bed rest or restricted mobility (inability to ambulate 30 feet continuously or removable leg brace) for less than 72 hours    [1 point]  [ ] Nonremovable plaster cast or mold that prevents calf muscle use   [2 points]  [ ] Bed bound  or restricted mobility for more than 72 hours                  [2 points]    GENDER SPECIFIC FACTORS  [ ] Pregnancy or had a baby within the last month   [1 point]  [ ] Hormone therapy  or oral contraception               [1 point]  [ ] Current use of estrogen-like drugs (raloxifine, tamoxifen, anastrozole, letrozole)                                [1 point]  [ ] History of unexplained stillborn infant, premature birth with toxemia or growth-restricted infant   [1 point]  [ ] Recurrent spontaneous abortions (3 or more)      [1 point]    OTHER RISK FACTORS                                         [ ] Current smoker (includes vaping and smoking marijuana)      [1 point]  [ ] Diabetes requiring insulin     [1 point]                   [ ] Chemotherapy (includes methotrexate for rheumatoid arthritis, hydroxyurea for thrombocytosis)    [1 point]  [ ] Blood transfusion(s)               [1 point]    SURGERY RELATED RISK FACTORS  [ ]  section within the last month                    [1 point]  [ ] Minor surgery is planned (less than 45 minutes)     [1 point]  [ ] Past major surgery (longer than 45 minutes) within past month  [2 points]  [ x] Planned major surgery lasting more than 45 minutes (includes laparoscopic and arthroscopic; do not add to the "5" for hip and knee replacement)    [2 points]  [ x Length of surgery over 2 hours (includes anesthesia time; do not add to the "5" for hip and knee replacement)   [1 point]  [ ] Elective hip or knee joint replacement surgery         [5 points]                                               TRAUMA RELATED RISK FACTORS  [ ] Fracture of the hip, pelvis, or leg                       [5 points]  [ ] Spinal cord injury resulting in paralysis (within the past month)    [5 points]  [ ] Paralysis  (within the past month)                      [5 points]  [ ] Multiple trauma (within the past month)         [5 Points]    Total Score [     4   ]    Total hip and total knee replacement:  Caprini score 9 or less: LOW risk  Caprini score 10 or highter: HIGH RISK    Caprini score 0-2: Low Risk, NO VTE prophylaxis required for most patients, encourage ambulation  Caprini score 3-6: Moderate Risk , pharmacologic VTE prophylaxis is indicated for most patients (in the absence of contraindications)  Caprini score 7 or higher: High risk, pharmocologic VTE prophylaxis indicated for most patients (in the absence of contraindications)

## 2025-05-22 NOTE — H&P PST ADULT - NSICDXFAMILYHX_GEN_ALL_CORE_FT
FAMILY HISTORY:  FH: diabetes mellitus    Father  Still living? Unknown  FH: heart disease, Age at diagnosis: Age Unknown  FH: kidney disease, Age at diagnosis: Age Unknown  FH: liver disease, Age at diagnosis: Age Unknown

## 2025-05-23 DIAGNOSIS — Z01.818 ENCOUNTER FOR OTHER PREPROCEDURAL EXAMINATION: ICD-10-CM

## 2025-05-23 PROBLEM — H91.90 UNSPECIFIED HEARING LOSS, UNSPECIFIED EAR: Chronic | Status: INACTIVE | Noted: 2020-08-31 | Resolved: 2025-05-22

## 2025-05-23 LAB
A1C WITH ESTIMATED AVERAGE GLUCOSE RESULT: 5.6 % — SIGNIFICANT CHANGE UP (ref 4–5.6)
ESTIMATED AVERAGE GLUCOSE: 114 MG/DL — SIGNIFICANT CHANGE UP (ref 68–114)

## 2025-06-02 DIAGNOSIS — Z01.818 ENCOUNTER FOR OTHER PREPROCEDURAL EXAMINATION: ICD-10-CM

## 2025-06-02 RX ORDER — METRONIDAZOLE 500 MG/1
500 TABLET ORAL 3 TIMES DAILY
Qty: 3 | Refills: 0 | Status: ACTIVE | COMMUNITY
Start: 2025-06-02 | End: 1900-01-01

## 2025-06-02 RX ORDER — NEOMYCIN SULFATE 500 MG/1
500 TABLET ORAL
Qty: 6 | Refills: 0 | Status: ACTIVE | COMMUNITY
Start: 2025-06-02 | End: 1900-01-01

## 2025-06-04 ENCOUNTER — INPATIENT (INPATIENT)
Facility: HOSPITAL | Age: 40
LOS: 2 days | Discharge: HOME | DRG: 951 | End: 2025-06-07
Attending: STUDENT IN AN ORGANIZED HEALTH CARE EDUCATION/TRAINING PROGRAM | Admitting: STUDENT IN AN ORGANIZED HEALTH CARE EDUCATION/TRAINING PROGRAM
Payer: COMMERCIAL

## 2025-06-04 ENCOUNTER — APPOINTMENT (OUTPATIENT)
Dept: COLORECTAL SURGERY | Facility: HOSPITAL | Age: 40
End: 2025-06-04

## 2025-06-04 ENCOUNTER — RESULT REVIEW (OUTPATIENT)
Age: 40
End: 2025-06-04

## 2025-06-04 VITALS
RESPIRATION RATE: 16 BRPM | OXYGEN SATURATION: 96 % | TEMPERATURE: 98 F | SYSTOLIC BLOOD PRESSURE: 134 MMHG | WEIGHT: 259.93 LBS | DIASTOLIC BLOOD PRESSURE: 88 MMHG | HEART RATE: 72 BPM | HEIGHT: 70 IN

## 2025-06-04 DIAGNOSIS — Z93.3 COLOSTOMY STATUS: ICD-10-CM

## 2025-06-04 DIAGNOSIS — Z90.89 ACQUIRED ABSENCE OF OTHER ORGANS: Chronic | ICD-10-CM

## 2025-06-04 DIAGNOSIS — Z98.890 OTHER SPECIFIED POSTPROCEDURAL STATES: Chronic | ICD-10-CM

## 2025-06-04 DIAGNOSIS — Z93.3 COLOSTOMY STATUS: Chronic | ICD-10-CM

## 2025-06-04 DIAGNOSIS — Z90.49 ACQUIRED ABSENCE OF OTHER SPECIFIED PARTS OF DIGESTIVE TRACT: Chronic | ICD-10-CM

## 2025-06-04 LAB
APPEARANCE UR: ABNORMAL
BILIRUB UR-MCNC: NEGATIVE — SIGNIFICANT CHANGE UP
COLOR SPEC: SIGNIFICANT CHANGE UP
DIFF PNL FLD: NEGATIVE — SIGNIFICANT CHANGE UP
GLUCOSE BLDC GLUCOMTR-MCNC: 113 MG/DL — HIGH (ref 70–99)
GLUCOSE BLDC GLUCOMTR-MCNC: 141 MG/DL — HIGH (ref 70–99)
GLUCOSE BLDC GLUCOMTR-MCNC: 152 MG/DL — HIGH (ref 70–99)
GLUCOSE UR QL: NEGATIVE MG/DL — SIGNIFICANT CHANGE UP
KETONES UR QL: NEGATIVE MG/DL — SIGNIFICANT CHANGE UP
LEUKOCYTE ESTERASE UR-ACNC: NEGATIVE — SIGNIFICANT CHANGE UP
NITRITE UR-MCNC: NEGATIVE — SIGNIFICANT CHANGE UP
PH UR: 5.5 — SIGNIFICANT CHANGE UP (ref 5–8)
PROT UR-MCNC: SIGNIFICANT CHANGE UP MG/DL
SP GR SPEC: >1.03 — HIGH (ref 1–1.03)
UROBILINOGEN FLD QL: 1 MG/DL — SIGNIFICANT CHANGE UP (ref 0.2–1)

## 2025-06-04 PROCEDURE — 88307 TISSUE EXAM BY PATHOLOGIST: CPT | Mod: 26

## 2025-06-04 PROCEDURE — 81001 URINALYSIS AUTO W/SCOPE: CPT

## 2025-06-04 PROCEDURE — C9399: CPT

## 2025-06-04 PROCEDURE — 97116 GAIT TRAINING THERAPY: CPT | Mod: GP

## 2025-06-04 PROCEDURE — 36415 COLL VENOUS BLD VENIPUNCTURE: CPT

## 2025-06-04 PROCEDURE — 84100 ASSAY OF PHOSPHORUS: CPT

## 2025-06-04 PROCEDURE — 44227 LAP CLOSE ENTEROSTOMY: CPT

## 2025-06-04 PROCEDURE — 80048 BASIC METABOLIC PNL TOTAL CA: CPT

## 2025-06-04 PROCEDURE — 88304 TISSUE EXAM BY PATHOLOGIST: CPT | Mod: 26

## 2025-06-04 PROCEDURE — 85027 COMPLETE CBC AUTOMATED: CPT

## 2025-06-04 PROCEDURE — 45300 PROCTOSIGMOIDOSCOPY DX: CPT

## 2025-06-04 PROCEDURE — 88307 TISSUE EXAM BY PATHOLOGIST: CPT

## 2025-06-04 PROCEDURE — C1889: CPT

## 2025-06-04 PROCEDURE — S2900 ROBOTIC SURGICAL SYSTEM: CPT | Mod: NC

## 2025-06-04 PROCEDURE — 83735 ASSAY OF MAGNESIUM: CPT

## 2025-06-04 PROCEDURE — S2900: CPT

## 2025-06-04 PROCEDURE — 88304 TISSUE EXAM BY PATHOLOGIST: CPT

## 2025-06-04 PROCEDURE — 99255 IP/OBS CONSLTJ NEW/EST HI 80: CPT

## 2025-06-04 PROCEDURE — 82962 GLUCOSE BLOOD TEST: CPT

## 2025-06-04 PROCEDURE — 85025 COMPLETE CBC W/AUTO DIFF WBC: CPT

## 2025-06-04 RX ORDER — FENOFIBRATE 160 MG/1
1 TABLET ORAL
Refills: 0 | DISCHARGE

## 2025-06-04 RX ORDER — SODIUM CHLORIDE 9 G/1000ML
1000 INJECTION, SOLUTION INTRAVENOUS
Refills: 0 | Status: DISCONTINUED | OUTPATIENT
Start: 2025-06-04 | End: 2025-06-05

## 2025-06-04 RX ORDER — DEXTROSE 50 % IN WATER 50 %
25 SYRINGE (ML) INTRAVENOUS ONCE
Refills: 0 | Status: DISCONTINUED | OUTPATIENT
Start: 2025-06-04 | End: 2025-06-07

## 2025-06-04 RX ORDER — ONDANSETRON HCL/PF 4 MG/2 ML
4 VIAL (ML) INJECTION ONCE
Refills: 0 | Status: DISCONTINUED | OUTPATIENT
Start: 2025-06-04 | End: 2025-06-04

## 2025-06-04 RX ORDER — DEXTROSE 50 % IN WATER 50 %
15 SYRINGE (ML) INTRAVENOUS ONCE
Refills: 0 | Status: DISCONTINUED | OUTPATIENT
Start: 2025-06-04 | End: 2025-06-07

## 2025-06-04 RX ORDER — BUPIVACAINE 13.3 MG/ML
20 INJECTION, SUSPENSION, LIPOSOMAL INFILTRATION ONCE
Refills: 0 | Status: DISCONTINUED | OUTPATIENT
Start: 2025-06-04 | End: 2025-06-07

## 2025-06-04 RX ORDER — CEFTRIAXONE 500 MG/1
1000 INJECTION, POWDER, FOR SOLUTION INTRAMUSCULAR; INTRAVENOUS EVERY 24 HOURS
Refills: 0 | Status: DISCONTINUED | OUTPATIENT
Start: 2025-06-04 | End: 2025-06-04

## 2025-06-04 RX ORDER — LOSARTAN POTASSIUM 100 MG/1
100 TABLET, FILM COATED ORAL DAILY
Refills: 0 | Status: DISCONTINUED | OUTPATIENT
Start: 2025-06-04 | End: 2025-06-07

## 2025-06-04 RX ORDER — FENOFIBRATE 160 MG/1
145 TABLET ORAL AT BEDTIME
Refills: 0 | Status: DISCONTINUED | OUTPATIENT
Start: 2025-06-04 | End: 2025-06-07

## 2025-06-04 RX ORDER — ONDANSETRON HCL/PF 4 MG/2 ML
4 VIAL (ML) INJECTION EVERY 6 HOURS
Refills: 0 | Status: DISCONTINUED | OUTPATIENT
Start: 2025-06-04 | End: 2025-06-07

## 2025-06-04 RX ORDER — ALVIMOPAN 12 MG/1
12 CAPSULE ORAL ONCE
Refills: 0 | Status: COMPLETED | OUTPATIENT
Start: 2025-06-04 | End: 2025-06-04

## 2025-06-04 RX ORDER — CEFTRIAXONE 500 MG/1
1000 INJECTION, POWDER, FOR SOLUTION INTRAMUSCULAR; INTRAVENOUS EVERY 24 HOURS
Refills: 0 | Status: COMPLETED | OUTPATIENT
Start: 2025-06-04 | End: 2025-06-06

## 2025-06-04 RX ORDER — ALVIMOPAN 12 MG/1
12 CAPSULE ORAL
Refills: 0 | Status: DISCONTINUED | OUTPATIENT
Start: 2025-06-05 | End: 2025-06-05

## 2025-06-04 RX ORDER — ACETAMINOPHEN 500 MG/5ML
1000 LIQUID (ML) ORAL ONCE
Refills: 0 | Status: DISCONTINUED | OUTPATIENT
Start: 2025-06-04 | End: 2025-06-05

## 2025-06-04 RX ORDER — GLUCAGON 3 MG/1
1 POWDER NASAL ONCE
Refills: 0 | Status: DISCONTINUED | OUTPATIENT
Start: 2025-06-04 | End: 2025-06-07

## 2025-06-04 RX ORDER — FENTANYL CITRATE-0.9 % NACL/PF 100MCG/2ML
50 SYRINGE (ML) INTRAVENOUS
Refills: 0 | Status: DISCONTINUED | OUTPATIENT
Start: 2025-06-04 | End: 2025-06-04

## 2025-06-04 RX ORDER — CEFOTETAN DISODIUM 1 G
2 VIAL (EA) INJECTION ONCE
Refills: 0 | Status: COMPLETED | OUTPATIENT
Start: 2025-06-04 | End: 2025-06-04

## 2025-06-04 RX ORDER — KETOROLAC TROMETHAMINE 30 MG/ML
15 INJECTION, SOLUTION INTRAMUSCULAR; INTRAVENOUS EVERY 6 HOURS
Refills: 0 | Status: DISCONTINUED | OUTPATIENT
Start: 2025-06-04 | End: 2025-06-05

## 2025-06-04 RX ORDER — OXYCODONE HYDROCHLORIDE 30 MG/1
5 TABLET ORAL ONCE
Refills: 0 | Status: DISCONTINUED | OUTPATIENT
Start: 2025-06-04 | End: 2025-06-04

## 2025-06-04 RX ORDER — B1/B2/B3/B5/B6/B12/VIT C/FOLIC 500-0.5 MG
1 TABLET ORAL
Refills: 0 | DISCHARGE

## 2025-06-04 RX ORDER — ALVIMOPAN 12 MG/1
12 CAPSULE ORAL
Refills: 0 | Status: DISCONTINUED | OUTPATIENT
Start: 2025-06-04 | End: 2025-06-04

## 2025-06-04 RX ORDER — ENOXAPARIN SODIUM 100 MG/ML
40 INJECTION SUBCUTANEOUS EVERY 24 HOURS
Refills: 0 | Status: DISCONTINUED | OUTPATIENT
Start: 2025-06-04 | End: 2025-06-05

## 2025-06-04 RX ORDER — HEPARIN SODIUM 1000 [USP'U]/ML
5000 INJECTION INTRAVENOUS; SUBCUTANEOUS ONCE
Refills: 0 | Status: COMPLETED | OUTPATIENT
Start: 2025-06-04 | End: 2025-06-04

## 2025-06-04 RX ORDER — DEXTROSE 50 % IN WATER 50 %
12.5 SYRINGE (ML) INTRAVENOUS ONCE
Refills: 0 | Status: DISCONTINUED | OUTPATIENT
Start: 2025-06-04 | End: 2025-06-07

## 2025-06-04 RX ORDER — INSULIN LISPRO 100 U/ML
INJECTION, SOLUTION INTRAVENOUS; SUBCUTANEOUS
Refills: 0 | Status: DISCONTINUED | OUTPATIENT
Start: 2025-06-04 | End: 2025-06-07

## 2025-06-04 RX ADMIN — FENOFIBRATE 145 MILLIGRAM(S): 160 TABLET ORAL at 20:19

## 2025-06-04 RX ADMIN — LOSARTAN POTASSIUM 100 MILLIGRAM(S): 100 TABLET, FILM COATED ORAL at 20:19

## 2025-06-04 RX ADMIN — Medication 3 MILLILITER(S): at 20:30

## 2025-06-04 RX ADMIN — HEPARIN SODIUM 5000 UNIT(S): 1000 INJECTION INTRAVENOUS; SUBCUTANEOUS at 07:42

## 2025-06-04 RX ADMIN — SODIUM CHLORIDE 125 MILLILITER(S): 9 INJECTION, SOLUTION INTRAVENOUS at 20:14

## 2025-06-04 RX ADMIN — KETOROLAC TROMETHAMINE 15 MILLIGRAM(S): 30 INJECTION, SOLUTION INTRAMUSCULAR; INTRAVENOUS at 20:38

## 2025-06-04 RX ADMIN — Medication 50 MICROGRAM(S): at 15:48

## 2025-06-04 RX ADMIN — CEFTRIAXONE 1000 MILLIGRAM(S): 500 INJECTION, POWDER, FOR SOLUTION INTRAMUSCULAR; INTRAVENOUS at 20:15

## 2025-06-04 RX ADMIN — Medication 50 MICROGRAM(S): at 15:30

## 2025-06-04 RX ADMIN — ALVIMOPAN 12 MILLIGRAM(S): 12 CAPSULE ORAL at 07:42

## 2025-06-04 RX ADMIN — KETOROLAC TROMETHAMINE 15 MILLIGRAM(S): 30 INJECTION, SOLUTION INTRAMUSCULAR; INTRAVENOUS at 21:52

## 2025-06-05 LAB
ANION GAP SERPL CALC-SCNC: 4 MMOL/L — LOW (ref 5–17)
BASOPHILS # BLD AUTO: 0.03 K/UL — SIGNIFICANT CHANGE UP (ref 0–0.2)
BASOPHILS NFR BLD AUTO: 0.3 % — SIGNIFICANT CHANGE UP (ref 0–2)
BUN SERPL-MCNC: 15 MG/DL — SIGNIFICANT CHANGE UP (ref 7–23)
CALCIUM SERPL-MCNC: 9 MG/DL — SIGNIFICANT CHANGE UP (ref 8.5–10.1)
CHLORIDE SERPL-SCNC: 105 MMOL/L — SIGNIFICANT CHANGE UP (ref 96–108)
CO2 SERPL-SCNC: 29 MMOL/L — SIGNIFICANT CHANGE UP (ref 22–31)
CREAT SERPL-MCNC: 1.31 MG/DL — HIGH (ref 0.5–1.3)
EGFR: 72 ML/MIN/1.73M2 — SIGNIFICANT CHANGE UP
EGFR: 72 ML/MIN/1.73M2 — SIGNIFICANT CHANGE UP
EOSINOPHIL # BLD AUTO: 0.11 K/UL — SIGNIFICANT CHANGE UP (ref 0–0.5)
EOSINOPHIL NFR BLD AUTO: 1 % — SIGNIFICANT CHANGE UP (ref 0–6)
GLUCOSE BLDC GLUCOMTR-MCNC: 103 MG/DL — HIGH (ref 70–99)
GLUCOSE BLDC GLUCOMTR-MCNC: 108 MG/DL — HIGH (ref 70–99)
GLUCOSE BLDC GLUCOMTR-MCNC: 109 MG/DL — HIGH (ref 70–99)
GLUCOSE BLDC GLUCOMTR-MCNC: 120 MG/DL — HIGH (ref 70–99)
GLUCOSE SERPL-MCNC: 108 MG/DL — HIGH (ref 70–99)
HCT VFR BLD CALC: 36.3 % — LOW (ref 39–50)
HGB BLD-MCNC: 12.6 G/DL — LOW (ref 13–17)
IMM GRANULOCYTES # BLD AUTO: 0.07 K/UL — SIGNIFICANT CHANGE UP (ref 0–0.07)
IMM GRANULOCYTES NFR BLD AUTO: 0.6 % — SIGNIFICANT CHANGE UP (ref 0–0.9)
LYMPHOCYTES # BLD AUTO: 1.64 K/UL — SIGNIFICANT CHANGE UP (ref 1–3.3)
LYMPHOCYTES NFR BLD AUTO: 14.5 % — SIGNIFICANT CHANGE UP (ref 13–44)
MAGNESIUM SERPL-MCNC: 2.4 MG/DL — SIGNIFICANT CHANGE UP (ref 1.6–2.6)
MCHC RBC-ENTMCNC: 30.4 PG — SIGNIFICANT CHANGE UP (ref 27–34)
MCHC RBC-ENTMCNC: 34.7 G/DL — SIGNIFICANT CHANGE UP (ref 32–36)
MCV RBC AUTO: 87.5 FL — SIGNIFICANT CHANGE UP (ref 80–100)
MONOCYTES # BLD AUTO: 1.19 K/UL — HIGH (ref 0–0.9)
MONOCYTES NFR BLD AUTO: 10.5 % — SIGNIFICANT CHANGE UP (ref 2–14)
NEUTROPHILS # BLD AUTO: 8.25 K/UL — HIGH (ref 1.8–7.4)
NEUTROPHILS NFR BLD AUTO: 73.1 % — SIGNIFICANT CHANGE UP (ref 43–77)
NRBC # BLD AUTO: 0 K/UL — SIGNIFICANT CHANGE UP (ref 0–0)
NRBC # FLD: 0 K/UL — SIGNIFICANT CHANGE UP (ref 0–0)
NRBC BLD AUTO-RTO: 0 /100 WBCS — SIGNIFICANT CHANGE UP (ref 0–0)
PHOSPHATE SERPL-MCNC: 2.5 MG/DL — SIGNIFICANT CHANGE UP (ref 2.5–4.5)
PLATELET # BLD AUTO: 328 K/UL — SIGNIFICANT CHANGE UP (ref 150–400)
PMV BLD: 10.3 FL — SIGNIFICANT CHANGE UP (ref 7–13)
POTASSIUM SERPL-MCNC: 3.5 MMOL/L — SIGNIFICANT CHANGE UP (ref 3.5–5.3)
POTASSIUM SERPL-SCNC: 3.5 MMOL/L — SIGNIFICANT CHANGE UP (ref 3.5–5.3)
RBC # BLD: 4.15 M/UL — LOW (ref 4.2–5.8)
RBC # FLD: 14.3 % — SIGNIFICANT CHANGE UP (ref 10.3–14.5)
SODIUM SERPL-SCNC: 138 MMOL/L — SIGNIFICANT CHANGE UP (ref 135–145)
WBC # BLD: 11.29 K/UL — HIGH (ref 3.8–10.5)
WBC # FLD AUTO: 11.29 K/UL — HIGH (ref 3.8–10.5)

## 2025-06-05 PROCEDURE — 99232 SBSQ HOSP IP/OBS MODERATE 35: CPT

## 2025-06-05 RX ORDER — OXYCODONE HYDROCHLORIDE 30 MG/1
5 TABLET ORAL ONCE
Refills: 0 | Status: DISCONTINUED | OUTPATIENT
Start: 2025-06-05 | End: 2025-06-05

## 2025-06-05 RX ORDER — SODIUM CHLORIDE 9 G/1000ML
1000 INJECTION, SOLUTION INTRAVENOUS
Refills: 0 | Status: DISCONTINUED | OUTPATIENT
Start: 2025-06-05 | End: 2025-06-07

## 2025-06-05 RX ORDER — HEPARIN SODIUM 1000 [USP'U]/ML
5000 INJECTION INTRAVENOUS; SUBCUTANEOUS EVERY 8 HOURS
Refills: 0 | Status: DISCONTINUED | OUTPATIENT
Start: 2025-06-05 | End: 2025-06-07

## 2025-06-05 RX ORDER — ACETAMINOPHEN 500 MG/5ML
1000 LIQUID (ML) ORAL ONCE
Refills: 0 | Status: COMPLETED | OUTPATIENT
Start: 2025-06-05 | End: 2025-06-05

## 2025-06-05 RX ORDER — OXYCODONE HYDROCHLORIDE 30 MG/1
5 TABLET ORAL EVERY 6 HOURS
Refills: 0 | Status: DISCONTINUED | OUTPATIENT
Start: 2025-06-05 | End: 2025-06-07

## 2025-06-05 RX ORDER — ACETAMINOPHEN 500 MG/5ML
650 LIQUID (ML) ORAL EVERY 6 HOURS
Refills: 0 | Status: DISCONTINUED | OUTPATIENT
Start: 2025-06-05 | End: 2025-06-07

## 2025-06-05 RX ORDER — SOD PHOS DI, MONO/K PHOS MONO 250 MG
1 TABLET ORAL ONCE
Refills: 0 | Status: COMPLETED | OUTPATIENT
Start: 2025-06-05 | End: 2025-06-05

## 2025-06-05 RX ADMIN — ALVIMOPAN 12 MILLIGRAM(S): 12 CAPSULE ORAL at 10:15

## 2025-06-05 RX ADMIN — Medication 400 MILLIGRAM(S): at 10:35

## 2025-06-05 RX ADMIN — SODIUM CHLORIDE 75 MILLILITER(S): 9 INJECTION, SOLUTION INTRAVENOUS at 21:45

## 2025-06-05 RX ADMIN — OXYCODONE HYDROCHLORIDE 5 MILLIGRAM(S): 30 TABLET ORAL at 22:30

## 2025-06-05 RX ADMIN — LOSARTAN POTASSIUM 100 MILLIGRAM(S): 100 TABLET, FILM COATED ORAL at 10:15

## 2025-06-05 RX ADMIN — Medication 40 MILLIEQUIVALENT(S): at 10:15

## 2025-06-05 RX ADMIN — Medication 3 MILLILITER(S): at 04:58

## 2025-06-05 RX ADMIN — FENOFIBRATE 145 MILLIGRAM(S): 160 TABLET ORAL at 20:31

## 2025-06-05 RX ADMIN — OXYCODONE HYDROCHLORIDE 5 MILLIGRAM(S): 30 TABLET ORAL at 18:43

## 2025-06-05 RX ADMIN — KETOROLAC TROMETHAMINE 15 MILLIGRAM(S): 30 INJECTION, SOLUTION INTRAMUSCULAR; INTRAVENOUS at 06:33

## 2025-06-05 RX ADMIN — Medication 3 MILLILITER(S): at 21:34

## 2025-06-05 RX ADMIN — KETOROLAC TROMETHAMINE 15 MILLIGRAM(S): 30 INJECTION, SOLUTION INTRAMUSCULAR; INTRAVENOUS at 06:03

## 2025-06-05 RX ADMIN — CEFTRIAXONE 1000 MILLIGRAM(S): 500 INJECTION, POWDER, FOR SOLUTION INTRAMUSCULAR; INTRAVENOUS at 20:28

## 2025-06-05 RX ADMIN — OXYCODONE HYDROCHLORIDE 5 MILLIGRAM(S): 30 TABLET ORAL at 19:13

## 2025-06-05 RX ADMIN — OXYCODONE HYDROCHLORIDE 5 MILLIGRAM(S): 30 TABLET ORAL at 21:43

## 2025-06-05 RX ADMIN — ENOXAPARIN SODIUM 40 MILLIGRAM(S): 100 INJECTION SUBCUTANEOUS at 10:16

## 2025-06-05 RX ADMIN — Medication 1 PACKET(S): at 10:15

## 2025-06-05 RX ADMIN — HEPARIN SODIUM 5000 UNIT(S): 1000 INJECTION INTRAVENOUS; SUBCUTANEOUS at 20:32

## 2025-06-05 RX ADMIN — SODIUM CHLORIDE 125 MILLILITER(S): 9 INJECTION, SOLUTION INTRAVENOUS at 10:36

## 2025-06-05 RX ADMIN — SODIUM CHLORIDE 125 MILLILITER(S): 9 INJECTION, SOLUTION INTRAVENOUS at 02:58

## 2025-06-05 RX ADMIN — SODIUM CHLORIDE 75 MILLILITER(S): 9 INJECTION, SOLUTION INTRAVENOUS at 15:47

## 2025-06-05 RX ADMIN — Medication 1000 MILLIGRAM(S): at 11:05

## 2025-06-05 RX ADMIN — Medication 3 MILLILITER(S): at 13:55

## 2025-06-06 ENCOUNTER — TRANSCRIPTION ENCOUNTER (OUTPATIENT)
Age: 40
End: 2025-06-06

## 2025-06-06 LAB
ANION GAP SERPL CALC-SCNC: 3 MMOL/L — LOW (ref 5–17)
BUN SERPL-MCNC: 13 MG/DL — SIGNIFICANT CHANGE UP (ref 7–23)
CALCIUM SERPL-MCNC: 8.9 MG/DL — SIGNIFICANT CHANGE UP (ref 8.5–10.1)
CHLORIDE SERPL-SCNC: 108 MMOL/L — SIGNIFICANT CHANGE UP (ref 96–108)
CO2 SERPL-SCNC: 27 MMOL/L — SIGNIFICANT CHANGE UP (ref 22–31)
CREAT SERPL-MCNC: 1.28 MG/DL — SIGNIFICANT CHANGE UP (ref 0.5–1.3)
EGFR: 74 ML/MIN/1.73M2 — SIGNIFICANT CHANGE UP
EGFR: 74 ML/MIN/1.73M2 — SIGNIFICANT CHANGE UP
GLUCOSE BLDC GLUCOMTR-MCNC: 101 MG/DL — HIGH (ref 70–99)
GLUCOSE BLDC GLUCOMTR-MCNC: 116 MG/DL — HIGH (ref 70–99)
GLUCOSE BLDC GLUCOMTR-MCNC: 118 MG/DL — HIGH (ref 70–99)
GLUCOSE BLDC GLUCOMTR-MCNC: 122 MG/DL — HIGH (ref 70–99)
GLUCOSE SERPL-MCNC: 107 MG/DL — HIGH (ref 70–99)
HCT VFR BLD CALC: 36.1 % — LOW (ref 39–50)
HGB BLD-MCNC: 11.9 G/DL — LOW (ref 13–17)
MAGNESIUM SERPL-MCNC: 2.3 MG/DL — SIGNIFICANT CHANGE UP (ref 1.6–2.6)
MCHC RBC-ENTMCNC: 29.7 PG — SIGNIFICANT CHANGE UP (ref 27–34)
MCHC RBC-ENTMCNC: 33 G/DL — SIGNIFICANT CHANGE UP (ref 32–36)
MCV RBC AUTO: 90 FL — SIGNIFICANT CHANGE UP (ref 80–100)
NRBC # BLD AUTO: 0 K/UL — SIGNIFICANT CHANGE UP (ref 0–0)
NRBC # FLD: 0 K/UL — SIGNIFICANT CHANGE UP (ref 0–0)
NRBC BLD AUTO-RTO: 0 /100 WBCS — SIGNIFICANT CHANGE UP (ref 0–0)
PHOSPHATE SERPL-MCNC: 2.2 MG/DL — LOW (ref 2.5–4.5)
PLATELET # BLD AUTO: 318 K/UL — SIGNIFICANT CHANGE UP (ref 150–400)
PMV BLD: 10.6 FL — SIGNIFICANT CHANGE UP (ref 7–13)
POTASSIUM SERPL-MCNC: 3.8 MMOL/L — SIGNIFICANT CHANGE UP (ref 3.5–5.3)
POTASSIUM SERPL-SCNC: 3.8 MMOL/L — SIGNIFICANT CHANGE UP (ref 3.5–5.3)
RBC # BLD: 4.01 M/UL — LOW (ref 4.2–5.8)
RBC # FLD: 14.5 % — SIGNIFICANT CHANGE UP (ref 10.3–14.5)
SODIUM SERPL-SCNC: 138 MMOL/L — SIGNIFICANT CHANGE UP (ref 135–145)
WBC # BLD: 10.08 K/UL — SIGNIFICANT CHANGE UP (ref 3.8–10.5)
WBC # FLD AUTO: 10.08 K/UL — SIGNIFICANT CHANGE UP (ref 3.8–10.5)

## 2025-06-06 PROCEDURE — 99232 SBSQ HOSP IP/OBS MODERATE 35: CPT

## 2025-06-06 RX ORDER — OXYCODONE HYDROCHLORIDE AND ACETAMINOPHEN 10; 325 MG/1; MG/1
1 TABLET ORAL
Qty: 20 | Refills: 0
Start: 2025-06-06

## 2025-06-06 RX ORDER — ACETAMINOPHEN 500 MG/5ML
1000 LIQUID (ML) ORAL ONCE
Refills: 0 | Status: COMPLETED | OUTPATIENT
Start: 2025-06-06 | End: 2025-06-06

## 2025-06-06 RX ORDER — ACETAMINOPHEN 500 MG/5ML
2 LIQUID (ML) ORAL
Qty: 0 | Refills: 0 | DISCHARGE
Start: 2025-06-06

## 2025-06-06 RX ORDER — SIMETHICONE 80 MG
80 TABLET,CHEWABLE ORAL
Refills: 0 | Status: DISCONTINUED | OUTPATIENT
Start: 2025-06-06 | End: 2025-06-07

## 2025-06-06 RX ORDER — SOD PHOS DI, MONO/K PHOS MONO 250 MG
2 TABLET ORAL ONCE
Refills: 0 | Status: COMPLETED | OUTPATIENT
Start: 2025-06-06 | End: 2025-06-06

## 2025-06-06 RX ADMIN — Medication 650 MILLIGRAM(S): at 03:42

## 2025-06-06 RX ADMIN — Medication 3 MILLILITER(S): at 20:34

## 2025-06-06 RX ADMIN — LOSARTAN POTASSIUM 100 MILLIGRAM(S): 100 TABLET, FILM COATED ORAL at 09:06

## 2025-06-06 RX ADMIN — Medication 80 MILLIGRAM(S): at 11:45

## 2025-06-06 RX ADMIN — HEPARIN SODIUM 5000 UNIT(S): 1000 INJECTION INTRAVENOUS; SUBCUTANEOUS at 13:28

## 2025-06-06 RX ADMIN — Medication 3 MILLILITER(S): at 03:43

## 2025-06-06 RX ADMIN — CEFTRIAXONE 1000 MILLIGRAM(S): 500 INJECTION, POWDER, FOR SOLUTION INTRAMUSCULAR; INTRAVENOUS at 20:46

## 2025-06-06 RX ADMIN — OXYCODONE HYDROCHLORIDE 5 MILLIGRAM(S): 30 TABLET ORAL at 12:00

## 2025-06-06 RX ADMIN — Medication 2 PACKET(S): at 12:51

## 2025-06-06 RX ADMIN — Medication 650 MILLIGRAM(S): at 02:10

## 2025-06-06 RX ADMIN — HEPARIN SODIUM 5000 UNIT(S): 1000 INJECTION INTRAVENOUS; SUBCUTANEOUS at 20:49

## 2025-06-06 RX ADMIN — SODIUM CHLORIDE 75 MILLILITER(S): 9 INJECTION, SOLUTION INTRAVENOUS at 11:04

## 2025-06-06 RX ADMIN — OXYCODONE HYDROCHLORIDE 5 MILLIGRAM(S): 30 TABLET ORAL at 11:02

## 2025-06-06 RX ADMIN — SODIUM CHLORIDE 75 MILLILITER(S): 9 INJECTION, SOLUTION INTRAVENOUS at 22:44

## 2025-06-06 RX ADMIN — Medication 400 MILLIGRAM(S): at 17:34

## 2025-06-06 RX ADMIN — Medication 80 MILLIGRAM(S): at 22:43

## 2025-06-06 RX ADMIN — FENOFIBRATE 145 MILLIGRAM(S): 160 TABLET ORAL at 20:48

## 2025-06-06 RX ADMIN — HEPARIN SODIUM 5000 UNIT(S): 1000 INJECTION INTRAVENOUS; SUBCUTANEOUS at 03:33

## 2025-06-06 RX ADMIN — Medication 3 MILLILITER(S): at 12:46

## 2025-06-06 RX ADMIN — Medication 80 MILLIGRAM(S): at 17:27

## 2025-06-07 ENCOUNTER — TRANSCRIPTION ENCOUNTER (OUTPATIENT)
Age: 40
End: 2025-06-07

## 2025-06-07 VITALS
TEMPERATURE: 99 F | OXYGEN SATURATION: 99 % | HEART RATE: 88 BPM | DIASTOLIC BLOOD PRESSURE: 87 MMHG | SYSTOLIC BLOOD PRESSURE: 142 MMHG | RESPIRATION RATE: 17 BRPM

## 2025-06-07 LAB
ANION GAP SERPL CALC-SCNC: 4 MMOL/L — LOW (ref 5–17)
BUN SERPL-MCNC: 12 MG/DL — SIGNIFICANT CHANGE UP (ref 7–23)
CALCIUM SERPL-MCNC: 9.7 MG/DL — SIGNIFICANT CHANGE UP (ref 8.5–10.1)
CHLORIDE SERPL-SCNC: 110 MMOL/L — HIGH (ref 96–108)
CO2 SERPL-SCNC: 24 MMOL/L — SIGNIFICANT CHANGE UP (ref 22–31)
CREAT SERPL-MCNC: 0.98 MG/DL — SIGNIFICANT CHANGE UP (ref 0.5–1.3)
EGFR: 102 ML/MIN/1.73M2 — SIGNIFICANT CHANGE UP
EGFR: 102 ML/MIN/1.73M2 — SIGNIFICANT CHANGE UP
GLUCOSE BLDC GLUCOMTR-MCNC: 103 MG/DL — HIGH (ref 70–99)
GLUCOSE BLDC GLUCOMTR-MCNC: 105 MG/DL — HIGH (ref 70–99)
GLUCOSE SERPL-MCNC: 96 MG/DL — SIGNIFICANT CHANGE UP (ref 70–99)
HCT VFR BLD CALC: 35.7 % — LOW (ref 39–50)
HGB BLD-MCNC: 11.8 G/DL — LOW (ref 13–17)
MAGNESIUM SERPL-MCNC: 2.4 MG/DL — SIGNIFICANT CHANGE UP (ref 1.6–2.6)
MCHC RBC-ENTMCNC: 29.7 PG — SIGNIFICANT CHANGE UP (ref 27–34)
MCHC RBC-ENTMCNC: 33.1 G/DL — SIGNIFICANT CHANGE UP (ref 32–36)
MCV RBC AUTO: 89.9 FL — SIGNIFICANT CHANGE UP (ref 80–100)
NRBC # BLD AUTO: 0 K/UL — SIGNIFICANT CHANGE UP (ref 0–0)
NRBC # FLD: 0 K/UL — SIGNIFICANT CHANGE UP (ref 0–0)
NRBC BLD AUTO-RTO: 0 /100 WBCS — SIGNIFICANT CHANGE UP (ref 0–0)
PHOSPHATE SERPL-MCNC: 3.3 MG/DL — SIGNIFICANT CHANGE UP (ref 2.5–4.5)
PLATELET # BLD AUTO: 325 K/UL — SIGNIFICANT CHANGE UP (ref 150–400)
PMV BLD: 10.2 FL — SIGNIFICANT CHANGE UP (ref 7–13)
POTASSIUM SERPL-MCNC: 4.1 MMOL/L — SIGNIFICANT CHANGE UP (ref 3.5–5.3)
POTASSIUM SERPL-SCNC: 4.1 MMOL/L — SIGNIFICANT CHANGE UP (ref 3.5–5.3)
RBC # BLD: 3.97 M/UL — LOW (ref 4.2–5.8)
RBC # FLD: 14 % — SIGNIFICANT CHANGE UP (ref 10.3–14.5)
SODIUM SERPL-SCNC: 138 MMOL/L — SIGNIFICANT CHANGE UP (ref 135–145)
WBC # BLD: 8.58 K/UL — SIGNIFICANT CHANGE UP (ref 3.8–10.5)
WBC # FLD AUTO: 8.58 K/UL — SIGNIFICANT CHANGE UP (ref 3.8–10.5)

## 2025-06-07 PROCEDURE — 99232 SBSQ HOSP IP/OBS MODERATE 35: CPT

## 2025-06-07 RX ADMIN — Medication 3 MILLILITER(S): at 05:20

## 2025-06-07 RX ADMIN — HEPARIN SODIUM 5000 UNIT(S): 1000 INJECTION INTRAVENOUS; SUBCUTANEOUS at 05:17

## 2025-06-07 RX ADMIN — LOSARTAN POTASSIUM 100 MILLIGRAM(S): 100 TABLET, FILM COATED ORAL at 09:20

## 2025-06-07 RX ADMIN — Medication 80 MILLIGRAM(S): at 05:19

## 2025-06-09 PROBLEM — M75.102 UNSPECIFIED ROTATOR CUFF TEAR OR RUPTURE OF LEFT SHOULDER, NOT SPECIFIED AS TRAUMATIC: Chronic | Status: ACTIVE | Noted: 2025-05-22

## 2025-06-09 PROBLEM — G47.30 SLEEP APNEA, UNSPECIFIED: Chronic | Status: ACTIVE | Noted: 2025-05-22

## 2025-06-09 PROBLEM — H91.90 UNSPECIFIED HEARING LOSS, UNSPECIFIED EAR: Chronic | Status: ACTIVE | Noted: 2025-05-22

## 2025-06-09 PROBLEM — Z93.3 COLOSTOMY STATUS: Chronic | Status: ACTIVE | Noted: 2025-05-22

## 2025-06-09 PROBLEM — R73.03 PREDIABETES: Chronic | Status: ACTIVE | Noted: 2025-05-22

## 2025-06-09 PROBLEM — K57.92 DIVERTICULITIS OF INTESTINE, PART UNSPECIFIED, WITHOUT PERFORATION OR ABSCESS WITHOUT BLEEDING: Chronic | Status: ACTIVE | Noted: 2025-05-22

## 2025-06-09 PROBLEM — K76.0 FATTY (CHANGE OF) LIVER, NOT ELSEWHERE CLASSIFIED: Chronic | Status: ACTIVE | Noted: 2025-05-22

## 2025-06-11 LAB — SURGICAL PATHOLOGY STUDY: SIGNIFICANT CHANGE UP

## 2025-06-12 DIAGNOSIS — Z43.3 ENCOUNTER FOR ATTENTION TO COLOSTOMY: ICD-10-CM

## 2025-06-12 DIAGNOSIS — Z82.49 FAMILY HISTORY OF ISCHEMIC HEART DISEASE AND OTHER DISEASES OF THE CIRCULATORY SYSTEM: ICD-10-CM

## 2025-06-12 DIAGNOSIS — I10 ESSENTIAL (PRIMARY) HYPERTENSION: ICD-10-CM

## 2025-06-12 DIAGNOSIS — G47.33 OBSTRUCTIVE SLEEP APNEA (ADULT) (PEDIATRIC): ICD-10-CM

## 2025-06-12 DIAGNOSIS — J98.11 ATELECTASIS: ICD-10-CM

## 2025-06-12 DIAGNOSIS — R73.03 PREDIABETES: ICD-10-CM

## 2025-06-12 DIAGNOSIS — Z83.3 FAMILY HISTORY OF DIABETES MELLITUS: ICD-10-CM

## 2025-06-12 DIAGNOSIS — E78.00 PURE HYPERCHOLESTEROLEMIA, UNSPECIFIED: ICD-10-CM

## 2025-06-12 DIAGNOSIS — D62 ACUTE POSTHEMORRHAGIC ANEMIA: ICD-10-CM

## 2025-06-18 ENCOUNTER — APPOINTMENT (OUTPATIENT)
Dept: COLORECTAL SURGERY | Facility: CLINIC | Age: 40
End: 2025-06-18
Payer: COMMERCIAL

## 2025-06-18 VITALS — HEIGHT: 70 IN | BODY MASS INDEX: 37.22 KG/M2 | WEIGHT: 260 LBS

## 2025-06-18 PROCEDURE — 99024 POSTOP FOLLOW-UP VISIT: CPT

## 2025-07-07 ENCOUNTER — APPOINTMENT (OUTPATIENT)
Dept: ORTHOPEDIC SURGERY | Facility: CLINIC | Age: 40
End: 2025-07-07
Payer: OTHER MISCELLANEOUS

## 2025-07-07 VITALS — WEIGHT: 260 LBS | BODY MASS INDEX: 37.22 KG/M2 | HEIGHT: 70 IN

## 2025-07-07 PROCEDURE — 99243 OFF/OP CNSLTJ NEW/EST LOW 30: CPT

## 2025-07-31 ENCOUNTER — APPOINTMENT (OUTPATIENT)
Dept: COLORECTAL SURGERY | Facility: CLINIC | Age: 40
End: 2025-07-31
Payer: COMMERCIAL

## 2025-07-31 VITALS
SYSTOLIC BLOOD PRESSURE: 123 MMHG | DIASTOLIC BLOOD PRESSURE: 81 MMHG | HEART RATE: 102 BPM | HEIGHT: 70 IN | RESPIRATION RATE: 15 BRPM | TEMPERATURE: 97.2 F | WEIGHT: 260 LBS | BODY MASS INDEX: 37.22 KG/M2

## 2025-07-31 DIAGNOSIS — Z09 ENCOUNTER FOR FOLLOW-UP EXAMINATION AFTER COMPLETED TREATMENT FOR CONDITIONS OTHER THAN MALIGNANT NEOPLASM: ICD-10-CM

## 2025-07-31 PROCEDURE — 99024 POSTOP FOLLOW-UP VISIT: CPT

## 2025-08-26 ENCOUNTER — APPOINTMENT (OUTPATIENT)
Dept: COLORECTAL SURGERY | Facility: CLINIC | Age: 40
End: 2025-08-26
Payer: COMMERCIAL

## 2025-08-26 VITALS
BODY MASS INDEX: 37.22 KG/M2 | DIASTOLIC BLOOD PRESSURE: 83 MMHG | HEIGHT: 70 IN | HEART RATE: 84 BPM | SYSTOLIC BLOOD PRESSURE: 123 MMHG | WEIGHT: 260 LBS | RESPIRATION RATE: 14 BRPM | OXYGEN SATURATION: 97 %

## 2025-08-26 DIAGNOSIS — Z09 ENCOUNTER FOR FOLLOW-UP EXAMINATION AFTER COMPLETED TREATMENT FOR CONDITIONS OTHER THAN MALIGNANT NEOPLASM: ICD-10-CM

## 2025-08-26 PROCEDURE — 99024 POSTOP FOLLOW-UP VISIT: CPT
